# Patient Record
Sex: MALE | Race: WHITE | NOT HISPANIC OR LATINO | Employment: OTHER | URBAN - METROPOLITAN AREA
[De-identification: names, ages, dates, MRNs, and addresses within clinical notes are randomized per-mention and may not be internally consistent; named-entity substitution may affect disease eponyms.]

---

## 2019-09-09 ENCOUNTER — OFFICE VISIT (OUTPATIENT)
Dept: INTERNAL MEDICINE | Facility: CLINIC | Age: 64
End: 2019-09-09

## 2019-09-09 VITALS
HEIGHT: 71 IN | WEIGHT: 202 LBS | OXYGEN SATURATION: 99 % | TEMPERATURE: 97.8 F | RESPIRATION RATE: 16 BRPM | HEART RATE: 64 BPM | BODY MASS INDEX: 28.28 KG/M2

## 2019-09-09 DIAGNOSIS — K21.9 GASTROESOPHAGEAL REFLUX DISEASE WITHOUT ESOPHAGITIS: ICD-10-CM

## 2019-09-09 DIAGNOSIS — I10 ESSENTIAL HYPERTENSION: ICD-10-CM

## 2019-09-09 DIAGNOSIS — E03.9 HYPOTHYROIDISM, UNSPECIFIED TYPE: Primary | ICD-10-CM

## 2019-09-09 PROCEDURE — 99204 OFFICE O/P NEW MOD 45 MIN: CPT | Performed by: FAMILY MEDICINE

## 2019-09-09 RX ORDER — OMEPRAZOLE 40 MG/1
40 CAPSULE, DELAYED RELEASE ORAL DAILY
Qty: 90 CAPSULE | Refills: 3 | Status: SHIPPED | OUTPATIENT
Start: 2019-09-09 | End: 2019-09-09 | Stop reason: SDUPTHER

## 2019-09-09 RX ORDER — OMEPRAZOLE 40 MG/1
40 CAPSULE, DELAYED RELEASE ORAL DAILY
Qty: 90 CAPSULE | Refills: 3 | Status: SHIPPED | OUTPATIENT
Start: 2019-09-09 | End: 2021-05-19 | Stop reason: SDUPTHER

## 2019-09-09 RX ORDER — LEVOTHYROXINE SODIUM 0.15 MG/1
150 TABLET ORAL DAILY
COMMUNITY
End: 2019-10-02 | Stop reason: DRUGHIGH

## 2019-09-09 RX ORDER — LOSARTAN POTASSIUM AND HYDROCHLOROTHIAZIDE 25; 100 MG/1; MG/1
1 TABLET ORAL DAILY
COMMUNITY
End: 2019-09-30 | Stop reason: SDUPTHER

## 2019-09-09 NOTE — PROGRESS NOTES
Subjective   Yohan Lawler is a 64 y.o. male.     Chief Complaint   Patient presents with   • New Patient     Chief complaint hypothyroidism, GERD, essential hypertension.    History of Present Illness     Patient new patient today's office visit.  Patient has a past medical history for hypothyroidism.  Patient is currently taking levothyroxine 150 mg daily.  Patient denies any side effects of the medication.  Patient has a past medical history for essential hypertension.  At today's office visit his blood pressure is 138/90.    Patient has a past medical history of essential hypertension.  Patient blood pressure is 138/90.  Patient is currently taking losartan-hydrochlorothiazide 100-25 mg daily.  He denies any side effects of the medication.    Patient has a past medical history for GERD.  Patient is currently taking omeprazole 40 mg daily.  Patient denies any side effects of the medication.    The following portions of the patient's history were reviewed and updated as appropriate: allergies, current medications, past family history, past medical history, past social history, past surgical history and problem list.    Review of Systems   Constitutional: Negative for chills and fever.   HENT: Negative for congestion, rhinorrhea, sinus pain and sore throat.    Eyes: Negative for photophobia and visual disturbance.   Respiratory: Negative for cough, chest tightness and shortness of breath.    Cardiovascular: Negative for chest pain and palpitations.   Gastrointestinal: Negative for diarrhea, nausea and vomiting.   Genitourinary: Negative for dysuria, frequency and urgency.   Skin: Negative for rash and wound.   Neurological: Negative for dizziness and syncope.   Psychiatric/Behavioral: Negative for behavioral problems and confusion.       Objective   Physical Exam   Constitutional: He is oriented to person, place, and time. He appears well-developed and well-nourished.   HENT:   Head: Normocephalic and  atraumatic.   Right Ear: External ear normal.   Left Ear: External ear normal.   Eyes: EOM are normal.   Neck: Normal range of motion. Neck supple.   Cardiovascular: Normal rate, regular rhythm and normal heart sounds.   Pulmonary/Chest: Effort normal and breath sounds normal. No respiratory distress.   Musculoskeletal: Normal range of motion.   Lymphadenopathy:     He has no cervical adenopathy.   Neurological: He is alert and oriented to person, place, and time.   Skin: Skin is warm.   Psychiatric: He has a normal mood and affect. His behavior is normal.   Nursing note and vitals reviewed.      Assessment/Plan   Yohan was seen today for new patient.    Diagnoses and all orders for this visit:    Hypothyroidism, unspecified type  -     Thyroid Panel With TSH  -     Thyroid Panel With TSH; Future  -     Continue levothyroxine 150 mcg daily.    Essential hypertension  -     Comprehensive Metabolic Panel  -     Comprehensive Metabolic Panel; Future  -     We will continue losartan-hydrochlorothiazide 100-25 mg daily.    Gastroesophageal reflux disease without esophagitis  -     We will restart patient on his Prilosec 40 mg daily.  -     omeprazole (priLOSEC) 40 MG capsule; Take 1 capsule by mouth Daily.          No Follow-up on file.    Dictated utilizing Dragon Voice Recognition Software

## 2019-09-10 ENCOUNTER — RESULTS ENCOUNTER (OUTPATIENT)
Dept: INTERNAL MEDICINE | Facility: CLINIC | Age: 64
End: 2019-09-10

## 2019-09-10 DIAGNOSIS — E03.9 HYPOTHYROIDISM, UNSPECIFIED TYPE: ICD-10-CM

## 2019-09-10 DIAGNOSIS — I10 ESSENTIAL HYPERTENSION: ICD-10-CM

## 2019-09-10 LAB
ALBUMIN SERPL-MCNC: 4.8 G/DL (ref 3.5–5.2)
ALBUMIN/GLOB SERPL: 2 G/DL
ALP SERPL-CCNC: 62 U/L (ref 39–117)
ALT SERPL-CCNC: 36 U/L (ref 1–41)
AST SERPL-CCNC: 28 U/L (ref 1–40)
BILIRUB SERPL-MCNC: 0.8 MG/DL (ref 0.2–1.2)
BUN SERPL-MCNC: 11 MG/DL (ref 8–23)
BUN/CREAT SERPL: 13.4 (ref 7–25)
CALCIUM SERPL-MCNC: 10.1 MG/DL (ref 8.6–10.5)
CHLORIDE SERPL-SCNC: 97 MMOL/L (ref 98–107)
CO2 SERPL-SCNC: 27 MMOL/L (ref 22–29)
CREAT SERPL-MCNC: 0.82 MG/DL (ref 0.76–1.27)
FT4I SERPL CALC-MCNC: 2.5 (ref 1.2–4.9)
GLOBULIN SER CALC-MCNC: 2.4 GM/DL
GLUCOSE SERPL-MCNC: 89 MG/DL (ref 65–99)
POTASSIUM SERPL-SCNC: 4.4 MMOL/L (ref 3.5–5.2)
PROT SERPL-MCNC: 7.2 G/DL (ref 6–8.5)
SODIUM SERPL-SCNC: 139 MMOL/L (ref 136–145)
T3RU NFR SERPL: 28 % (ref 24–39)
T4 SERPL-MCNC: 8.8 UG/DL (ref 4.5–12)
TSH SERPL DL<=0.005 MIU/L-ACNC: 2.12 UIU/ML (ref 0.45–4.5)

## 2019-09-30 DIAGNOSIS — E03.9 HYPOTHYROIDISM, UNSPECIFIED TYPE: Primary | ICD-10-CM

## 2019-09-30 RX ORDER — LOSARTAN POTASSIUM AND HYDROCHLOROTHIAZIDE 25; 100 MG/1; MG/1
1 TABLET ORAL DAILY
Qty: 90 TABLET | Refills: 0 | Status: SHIPPED | OUTPATIENT
Start: 2019-09-30 | End: 2019-10-23 | Stop reason: ALTCHOICE

## 2019-10-01 NOTE — TELEPHONE ENCOUNTER
If he has been consistently taking 137mcg we can switch him to that. Lets make sure he has a follow up in the office in the next 2 mos. Thanks.

## 2019-10-02 RX ORDER — LEVOTHYROXINE SODIUM 137 UG/1
137 TABLET ORAL DAILY
Qty: 30 TABLET | Refills: 0 | Status: SHIPPED | OUTPATIENT
Start: 2019-10-02 | End: 2019-10-07 | Stop reason: SDUPTHER

## 2019-10-07 DIAGNOSIS — E03.9 HYPOTHYROIDISM, UNSPECIFIED TYPE: ICD-10-CM

## 2019-10-07 RX ORDER — LEVOTHYROXINE SODIUM 137 UG/1
137 TABLET ORAL DAILY
Qty: 90 TABLET | Refills: 2 | Status: SHIPPED | OUTPATIENT
Start: 2019-10-07 | End: 2019-11-20 | Stop reason: SDUPTHER

## 2019-10-22 RX ORDER — HYDROCHLOROTHIAZIDE 25 MG/1
25 TABLET ORAL DAILY
Qty: 30 TABLET | Refills: 1 | Status: SHIPPED | OUTPATIENT
Start: 2019-10-22 | End: 2019-11-20 | Stop reason: SDUPTHER

## 2019-10-22 RX ORDER — LOSARTAN POTASSIUM 100 MG/1
100 TABLET ORAL DAILY
Qty: 30 TABLET | Refills: 1 | Status: SHIPPED | OUTPATIENT
Start: 2019-10-22 | End: 2019-10-31 | Stop reason: SDUPTHER

## 2019-10-23 ENCOUNTER — OFFICE VISIT (OUTPATIENT)
Dept: INTERNAL MEDICINE | Facility: CLINIC | Age: 64
End: 2019-10-23

## 2019-10-23 VITALS
DIASTOLIC BLOOD PRESSURE: 66 MMHG | BODY MASS INDEX: 28.49 KG/M2 | RESPIRATION RATE: 18 BRPM | HEIGHT: 71 IN | OXYGEN SATURATION: 95 % | WEIGHT: 203.5 LBS | HEART RATE: 87 BPM | TEMPERATURE: 98.2 F | SYSTOLIC BLOOD PRESSURE: 120 MMHG

## 2019-10-23 DIAGNOSIS — Z11.59 ENCOUNTER FOR HEPATITIS C SCREENING TEST FOR LOW RISK PATIENT: ICD-10-CM

## 2019-10-23 DIAGNOSIS — R73.9 BLOOD GLUCOSE ELEVATED: ICD-10-CM

## 2019-10-23 DIAGNOSIS — Z23 NEED FOR VACCINATION AGAINST STREPTOCOCCUS PNEUMONIAE: ICD-10-CM

## 2019-10-23 DIAGNOSIS — Z23 NEED FOR PNEUMOCOCCAL VACCINATION: ICD-10-CM

## 2019-10-23 DIAGNOSIS — Z12.11 ENCOUNTER FOR SCREENING COLONOSCOPY: ICD-10-CM

## 2019-10-23 DIAGNOSIS — Z00.00 HEALTH CARE MAINTENANCE: ICD-10-CM

## 2019-10-23 DIAGNOSIS — Z00.00 MEDICARE WELCOME VISIT: Primary | ICD-10-CM

## 2019-10-23 PROBLEM — R20.2 TINGLING: Status: ACTIVE | Noted: 2019-10-23

## 2019-10-23 PROBLEM — G56.00 CARPAL TUNNEL SYNDROME: Status: ACTIVE | Noted: 2017-11-14

## 2019-10-23 PROBLEM — I10 BENIGN ESSENTIAL HTN: Status: ACTIVE | Noted: 2019-10-23

## 2019-10-23 PROBLEM — M71.9 BURSITIS: Status: ACTIVE | Noted: 2019-10-23

## 2019-10-23 PROCEDURE — G0009 ADMIN PNEUMOCOCCAL VACCINE: HCPCS | Performed by: FAMILY MEDICINE

## 2019-10-23 PROCEDURE — 90670 PCV13 VACCINE IM: CPT | Performed by: FAMILY MEDICINE

## 2019-10-23 PROCEDURE — G0438 PPPS, INITIAL VISIT: HCPCS | Performed by: FAMILY MEDICINE

## 2019-10-23 RX ORDER — CHLORAL HYDRATE 500 MG
1000 CAPSULE ORAL
COMMUNITY

## 2019-10-23 NOTE — PROGRESS NOTES
The ABCs of the Annual Wellness Visit  [facility] to Medicare Visit    Chief Complaint   Patient presents with   • Welcome To Medicare       Subjective   History of Present Illness:  Yohan Lawler is a 64 y.o. male who presents for a  Welcome to Medicare Visit.    HEALTH RISK ASSESSMENT    Recent Hospitalizations:  No hospitalization(s) within the last year.    Current Medical Providers:  Patient Care Team:  Rusty Hickey MD as PCP - General (Family Medicine)    Smoking Status:  Social History     Tobacco Use   Smoking Status Never Smoker   Smokeless Tobacco Never Used       Alcohol Consumption:  Social History     Substance and Sexual Activity   Alcohol Use Yes   • Alcohol/week: 7.2 oz   • Types: 12 Cans of beer per week   • Frequency: 4 or more times a week   • Drinks per session: 10 or more   • Binge frequency: Weekly       Depression Screen:   PHQ-2/PHQ-9 Depression Screening 10/23/2019   Little interest or pleasure in doing things 0   Feeling down, depressed, or hopeless 0   Total Score 0       Fall Risk Screen:  CHANNING Fall Risk Assessment was completed, and patient is at HIGH risk for falls. Assessment completed on:9/9/2019    Health Habits and Functional and Cognitive Screening:  Functional & Cognitive Status 10/23/2019   Do you have difficulty preparing food and eating? No   Do you have difficulty bathing yourself, getting dressed or grooming yourself? No   Do you have difficulty using the toilet? No   Do you have difficulty moving around from place to place? No   Do you have trouble with steps or getting out of a bed or a chair? No   Current Diet Well Balanced Diet   Dental Exam Up to date   Eye Exam Up to date   Exercise (times per week) 7 times per week   Current Exercise Activities Include Cardiovasular Workout on Exercise Equipment   Do you need help using the phone?  No   Are you deaf or do you have serious difficulty hearing?  Yes   Do you need help with transportation? No   Do you need help  shopping? No   Do you need help preparing meals?  No   Do you need help with housework?  No   Do you need help with laundry? No   Do you need help taking your medications? No   Do you need help managing money? No   Do you ever drive or ride in a car without wearing a seat belt? No   Have you felt unusual stress, anger or loneliness in the last month? No   Who do you live with? Spouse   If you need help, do you have trouble finding someone available to you? No   Have you been bothered in the last four weeks by sexual problems? No   Do you have difficulty concentrating, remembering or making decisions? No         Does the patient have evidence of cognitive impairment? No    Asprin use counseling:Taking ASA appropriately as indicated    Visual Acuity:    No exam data present    Age-appropriate Screening Schedule:  Refer to the list below for future screening recommendations based on patient's age, sex and/or medical conditions. Orders for these recommended tests are listed in the plan section. The patient has been provided with a written plan.    Health Maintenance   Topic Date Due   • TDAP/TD VACCINES (1 - Tdap) 08/12/1972   • ZOSTER VACCINE (1 of 2) 08/12/2003   • PNEUMOCOCCAL VACCINES (65+ LOW/MEDIUM RISK) (1 of 2 - PCV13) 08/12/2018   • COLONOSCOPY  09/09/2019   • INFLUENZA VACCINE  Completed          The following portions of the patient's history were reviewed and updated as appropriate: allergies, current medications, past family history, past medical history, past social history, past surgical history and problem list.    Outpatient Medications Prior to Visit   Medication Sig Dispense Refill   • aspirin 81 MG tablet Take 81 mg by mouth Daily.     • diphenhydrAMINE-APAP, sleep,  MG/30ML liquid Take  by mouth.     • hydroCHLOROthiazide (HYDRODIURIL) 25 MG tablet Take 1 tablet by mouth Daily. 30 tablet 1   • levothyroxine (SYNTHROID, LEVOTHROID) 137 MCG tablet Take 1 tablet by mouth Daily. 90 tablet 2   •  losartan (COZAAR) 100 MG tablet Take 1 tablet by mouth Daily. 30 tablet 1   • MULTIPLE VITAMINS-MINERALS ER PO Take  by mouth.     • Omega-3 Fatty Acids (FISH OIL) 1000 MG capsule capsule Take  by mouth.     • omeprazole (priLOSEC) 40 MG capsule Take 1 capsule by mouth Daily. 90 capsule 3   • losartan-hydrochlorothiazide (HYZAAR) 100-25 MG per tablet Take 1 tablet by mouth Daily. 90 tablet 0     No facility-administered medications prior to visit.        Patient Active Problem List   Diagnosis   • Essential hypertension   • Hypothyroidism   • GERD (gastroesophageal reflux disease)   • Blood glucose elevated   • Bursitis   • Carpal tunnel syndrome   • Tingling   • Benign essential HTN       Advanced Care Planning:  Patient does not have an advance directive - not interested in additional information    Review of Systems   Constitutional: Negative for chills and fever.   HENT: Negative for congestion, rhinorrhea, sinus pain and sore throat.    Eyes: Negative for photophobia and visual disturbance.   Respiratory: Negative for cough, chest tightness and shortness of breath.    Cardiovascular: Negative for chest pain and palpitations.   Gastrointestinal: Negative for diarrhea, nausea and vomiting.   Genitourinary: Negative for dysuria, frequency and urgency.   Skin: Negative for rash and wound.   Neurological: Negative for dizziness and syncope.   Psychiatric/Behavioral: Negative for behavioral problems and confusion.       Compared to one year ago, the patient feels his physical health is the same.  Compared to one year ago, the patient feels his mental health is the same.    Reviewed chart for potential of high risk medication in the elderly: yes  Reviewed chart for potential of harmful drug interactions in the elderly:yes    Objective         Vitals:    10/23/19 1418   BP: 120/66   BP Location: Left arm   Patient Position: Sitting   Cuff Size: Adult   Pulse: 87   Resp: 18   Temp: 98.2 °F (36.8 °C)   TempSrc: Oral  "  SpO2: 95%   Weight: 92.3 kg (203 lb 8 oz)   Height: 180.3 cm (70.98\")   PainSc: 0-No pain       Body mass index is 28.4 kg/m².  Discussed the patient's BMI with him. The BMI is in the acceptable range.    Physical Exam   Constitutional: He is oriented to person, place, and time. He appears well-developed and well-nourished.   HENT:   Head: Normocephalic and atraumatic.   Right Ear: External ear normal.   Left Ear: External ear normal.   Eyes: EOM are normal.   Neck: Normal range of motion. Neck supple.   Cardiovascular: Normal rate, regular rhythm and normal heart sounds.   Pulmonary/Chest: Effort normal and breath sounds normal. No respiratory distress.   Musculoskeletal: Normal range of motion.   Lymphadenopathy:     He has no cervical adenopathy.   Neurological: He is alert and oriented to person, place, and time.   Skin: Skin is warm.   Psychiatric: He has a normal mood and affect. His behavior is normal.   Nursing note and vitals reviewed.      Lab Results   Component Value Date    GLU 89 09/09/2019        Procedures    Assessment/Plan   Medicare Risks and Personalized Health Plan  CMS Preventative Services Quick Reference  Cardiovascular risk  Immunizations Discussed/Encouraged (specific immunizations; Prevnar )    The above risks/problems have been discussed with the patient.  Pertinent information has been shared with the patient in the After Visit Summary.  Follow up plans and orders are seen below in the Assessment/Plan Section.    Diagnoses and all orders for this visit:    1. Medicare welcome visit (Primary)    2. Encounter for hepatitis C screening test for low risk patient  -     Hepatitis C Antibody    3. Need for vaccination against Streptococcus pneumoniae    4. Encounter for screening colonoscopy  -     Ambulatory Referral to Gastroenterology    5. Need for pneumococcal vaccination  -     Pneumococcal Conjugate Vaccine 13-Valent All    6. Blood glucose elevated  -     Hemoglobin A1c    7. Health " care maintenance  -     Lipid Panel With LDL / HDL Ratio  -     Thyroid Panel With TSH  -     Comprehensive Metabolic Panel  -     CBC & Differential  -     Hemoglobin A1c        Follow Up:  No Follow-up on file.     An After Visit Summary and PPPS were given to the patient.

## 2019-10-26 LAB
ALBUMIN SERPL-MCNC: 4.7 G/DL (ref 3.5–5.2)
ALBUMIN/GLOB SERPL: 2 G/DL
ALP SERPL-CCNC: 86 U/L (ref 39–117)
ALT SERPL-CCNC: 32 U/L (ref 1–41)
AST SERPL-CCNC: 23 U/L (ref 1–40)
BASOPHILS # BLD AUTO: 0.03 10*3/MM3 (ref 0–0.2)
BASOPHILS NFR BLD AUTO: 0.5 % (ref 0–1.5)
BILIRUB SERPL-MCNC: 0.3 MG/DL (ref 0.2–1.2)
BUN SERPL-MCNC: 9 MG/DL (ref 8–23)
BUN/CREAT SERPL: 9.8 (ref 7–25)
CALCIUM SERPL-MCNC: 9.5 MG/DL (ref 8.6–10.5)
CHLORIDE SERPL-SCNC: 94 MMOL/L (ref 98–107)
CHOLEST SERPL-MCNC: 152 MG/DL (ref 0–200)
CO2 SERPL-SCNC: 27.5 MMOL/L (ref 22–29)
CREAT SERPL-MCNC: 0.92 MG/DL (ref 0.76–1.27)
EOSINOPHIL # BLD AUTO: 0.44 10*3/MM3 (ref 0–0.4)
EOSINOPHIL NFR BLD AUTO: 6.8 % (ref 0.3–6.2)
ERYTHROCYTE [DISTWIDTH] IN BLOOD BY AUTOMATED COUNT: 12 % (ref 12.3–15.4)
FT4I SERPL CALC-MCNC: 2.3 (ref 1.2–4.9)
GLOBULIN SER CALC-MCNC: 2.4 GM/DL
GLUCOSE SERPL-MCNC: 106 MG/DL (ref 65–99)
HBA1C MFR BLD: 5.4 % (ref 4.8–5.6)
HCT VFR BLD AUTO: 42.4 % (ref 37.5–51)
HCV AB S/CO SERPL IA: 0.2 S/CO RATIO (ref 0–0.9)
HDLC SERPL-MCNC: 49 MG/DL (ref 40–60)
HGB BLD-MCNC: 15 G/DL (ref 13–17.7)
IMM GRANULOCYTES # BLD AUTO: 0.04 10*3/MM3 (ref 0–0.05)
IMM GRANULOCYTES NFR BLD AUTO: 0.6 % (ref 0–0.5)
LDLC SERPL CALC-MCNC: 86 MG/DL (ref 0–100)
LDLC/HDLC SERPL: 1.76 {RATIO}
LYMPHOCYTES # BLD AUTO: 1.35 10*3/MM3 (ref 0.7–3.1)
LYMPHOCYTES NFR BLD AUTO: 20.8 % (ref 19.6–45.3)
MCH RBC QN AUTO: 32.8 PG (ref 26.6–33)
MCHC RBC AUTO-ENTMCNC: 35.4 G/DL (ref 31.5–35.7)
MCV RBC AUTO: 92.8 FL (ref 79–97)
MONOCYTES # BLD AUTO: 0.53 10*3/MM3 (ref 0.1–0.9)
MONOCYTES NFR BLD AUTO: 8.2 % (ref 5–12)
NEUTROPHILS # BLD AUTO: 4.09 10*3/MM3 (ref 1.7–7)
NEUTROPHILS NFR BLD AUTO: 63.1 % (ref 42.7–76)
NRBC BLD AUTO-RTO: 0 /100 WBC (ref 0–0.2)
PLATELET # BLD AUTO: 406 10*3/MM3 (ref 140–450)
POTASSIUM SERPL-SCNC: 4.6 MMOL/L (ref 3.5–5.2)
PROT SERPL-MCNC: 7.1 G/DL (ref 6–8.5)
RBC # BLD AUTO: 4.57 10*6/MM3 (ref 4.14–5.8)
SODIUM SERPL-SCNC: 135 MMOL/L (ref 136–145)
T3RU NFR SERPL: 27 % (ref 24–39)
T4 SERPL-MCNC: 8.5 UG/DL (ref 4.5–12)
TRIGL SERPL-MCNC: 83 MG/DL (ref 0–150)
TSH SERPL DL<=0.005 MIU/L-ACNC: 4.05 UIU/ML (ref 0.45–4.5)
VLDLC SERPL CALC-MCNC: 16.6 MG/DL
WBC # BLD AUTO: 6.48 10*3/MM3 (ref 3.4–10.8)

## 2019-10-28 DIAGNOSIS — E87.1 HYPONATREMIA: Primary | ICD-10-CM

## 2019-10-28 NOTE — PROGRESS NOTES
Please inform the patient of the following abnormal results.  Labs are normal however sodium levels appear to be slightly low.  He needs to repeat a BMP later this week.  Orders have been placed.

## 2019-10-30 LAB
BUN SERPL-MCNC: 7 MG/DL (ref 8–23)
BUN/CREAT SERPL: 8.5 (ref 7–25)
CALCIUM SERPL-MCNC: 9.6 MG/DL (ref 8.6–10.5)
CHLORIDE SERPL-SCNC: 94 MMOL/L (ref 98–107)
CO2 SERPL-SCNC: 28 MMOL/L (ref 22–29)
CREAT SERPL-MCNC: 0.82 MG/DL (ref 0.76–1.27)
GLUCOSE SERPL-MCNC: 107 MG/DL (ref 65–99)
POTASSIUM SERPL-SCNC: 4.6 MMOL/L (ref 3.5–5.2)
SODIUM SERPL-SCNC: 135 MMOL/L (ref 136–145)

## 2019-10-31 RX ORDER — LOSARTAN POTASSIUM 100 MG/1
100 TABLET ORAL DAILY
Qty: 90 TABLET | Refills: 0 | Status: SHIPPED | OUTPATIENT
Start: 2019-10-31 | End: 2019-11-20 | Stop reason: SDUPTHER

## 2019-11-02 ENCOUNTER — RESULTS ENCOUNTER (OUTPATIENT)
Dept: INTERNAL MEDICINE | Facility: CLINIC | Age: 64
End: 2019-11-02

## 2019-11-02 DIAGNOSIS — E87.1 HYPONATREMIA: ICD-10-CM

## 2019-11-19 ENCOUNTER — DOCUMENTATION (OUTPATIENT)
Dept: SURGERY | Facility: CLINIC | Age: 64
End: 2019-11-19

## 2019-11-21 ENCOUNTER — TELEPHONE (OUTPATIENT)
Dept: GASTROENTEROLOGY | Facility: CLINIC | Age: 64
End: 2019-11-21

## 2019-11-21 NOTE — TELEPHONE ENCOUNTER
FAST TRACK (IN MEDIA) - LAST COLONOSCOPY 2014, UNABLE TO LOCATE REPORT - PERSONAL HISTORY POLYPS - SCHEDULE AT [facility].

## 2019-11-24 ENCOUNTER — PREP FOR SURGERY (OUTPATIENT)
Dept: OTHER | Facility: HOSPITAL | Age: 64
End: 2019-11-24

## 2019-11-24 DIAGNOSIS — Z86.010 PERSONAL HISTORY OF COLONIC POLYPS: Primary | ICD-10-CM

## 2019-11-25 ENCOUNTER — HOSPITAL ENCOUNTER (OUTPATIENT)
Facility: HOSPITAL | Age: 64
Setting detail: HOSPITAL OUTPATIENT SURGERY
End: 2019-11-25
Attending: INTERNAL MEDICINE | Admitting: INTERNAL MEDICINE

## 2019-11-25 PROBLEM — Z86.010 PERSONAL HISTORY OF COLONIC POLYPS: Status: ACTIVE | Noted: 2019-11-25

## 2019-11-25 PROBLEM — Z86.0100 PERSONAL HISTORY OF COLONIC POLYPS: Status: ACTIVE | Noted: 2019-11-25

## 2019-11-25 NOTE — TELEPHONE ENCOUNTER
CALLED AND SPOKE WITH PATIENT.  SCHEDULED AT [facility] 02/04/2020 AT 3PM -ARRIVE 2PM.  WILL MAIL INSTRUCTIONS.

## 2020-01-23 ENCOUNTER — TELEPHONE (OUTPATIENT)
Dept: GASTROENTEROLOGY | Facility: CLINIC | Age: 65
End: 2020-01-23

## 2020-01-23 ENCOUNTER — PREP FOR SURGERY (OUTPATIENT)
Dept: GASTROENTEROLOGY | Facility: CLINIC | Age: 65
End: 2020-01-23

## 2020-01-23 DIAGNOSIS — K63.5 POLYP OF COLON, UNSPECIFIED PART OF COLON, UNSPECIFIED TYPE: ICD-10-CM

## 2020-01-23 DIAGNOSIS — Z12.11 ENCOUNTER FOR SCREENING FOR MALIGNANT NEOPLASM OF COLON: Primary | ICD-10-CM

## 2020-01-23 NOTE — TELEPHONE ENCOUNTER
Spoke with patient.  Scheduled for colonoscopy on 02/04/2020.  Wants to schedule with Dr. Dockery.  Gave him his phone number.

## 2020-02-26 ENCOUNTER — OUTSIDE FACILITY SERVICE (OUTPATIENT)
Dept: GASTROENTEROLOGY | Facility: CLINIC | Age: 65
End: 2020-02-26

## 2020-02-26 ENCOUNTER — LAB REQUISITION (OUTPATIENT)
Dept: LAB | Facility: HOSPITAL | Age: 65
End: 2020-02-26

## 2020-02-26 DIAGNOSIS — Z86.010 PERSONAL HISTORY OF COLONIC POLYPS: ICD-10-CM

## 2020-02-26 PROCEDURE — 88305 TISSUE EXAM BY PATHOLOGIST: CPT | Performed by: INTERNAL MEDICINE

## 2020-02-26 PROCEDURE — 45380 COLONOSCOPY AND BIOPSY: CPT | Performed by: INTERNAL MEDICINE

## 2020-02-27 LAB
CYTO UR: NORMAL
LAB AP CASE REPORT: NORMAL
LAB AP CLINICAL INFORMATION: NORMAL
PATH REPORT.FINAL DX SPEC: NORMAL
PATH REPORT.GROSS SPEC: NORMAL

## 2020-09-08 RX ORDER — HYDROCHLOROTHIAZIDE 25 MG/1
25 TABLET ORAL DAILY
Qty: 30 TABLET | Refills: 0 | Status: SHIPPED | OUTPATIENT
Start: 2020-09-08 | End: 2020-10-12 | Stop reason: SDUPTHER

## 2020-09-22 ENCOUNTER — CLINICAL SUPPORT (OUTPATIENT)
Dept: INTERNAL MEDICINE | Facility: CLINIC | Age: 65
End: 2020-09-22

## 2020-09-22 DIAGNOSIS — Z23 NEEDS FLU SHOT: Primary | ICD-10-CM

## 2020-09-22 PROCEDURE — 90694 VACC AIIV4 NO PRSRV 0.5ML IM: CPT | Performed by: FAMILY MEDICINE

## 2020-09-22 PROCEDURE — G0008 ADMIN INFLUENZA VIRUS VAC: HCPCS | Performed by: FAMILY MEDICINE

## 2020-10-02 ENCOUNTER — APPOINTMENT (OUTPATIENT)
Dept: GENERAL RADIOLOGY | Facility: HOSPITAL | Age: 65
End: 2020-10-02

## 2020-10-02 PROCEDURE — 72100 X-RAY EXAM L-S SPINE 2/3 VWS: CPT | Performed by: PHYSICIAN ASSISTANT

## 2020-10-02 PROCEDURE — 71101 X-RAY EXAM UNILAT RIBS/CHEST: CPT | Performed by: PHYSICIAN ASSISTANT

## 2020-10-12 RX ORDER — HYDROCHLOROTHIAZIDE 25 MG/1
25 TABLET ORAL DAILY
Qty: 30 TABLET | Refills: 0 | Status: SHIPPED | OUTPATIENT
Start: 2020-10-12 | End: 2020-11-09 | Stop reason: SDUPTHER

## 2020-11-09 ENCOUNTER — LAB (OUTPATIENT)
Dept: LAB | Facility: HOSPITAL | Age: 65
End: 2020-11-09

## 2020-11-09 ENCOUNTER — OFFICE VISIT (OUTPATIENT)
Dept: INTERNAL MEDICINE | Facility: CLINIC | Age: 65
End: 2020-11-09

## 2020-11-09 VITALS
WEIGHT: 212 LBS | BODY MASS INDEX: 30.35 KG/M2 | SYSTOLIC BLOOD PRESSURE: 124 MMHG | HEART RATE: 74 BPM | OXYGEN SATURATION: 98 % | HEIGHT: 70 IN | TEMPERATURE: 98.3 F | RESPIRATION RATE: 16 BRPM | DIASTOLIC BLOOD PRESSURE: 84 MMHG

## 2020-11-09 DIAGNOSIS — Z00.00 HEALTHCARE MAINTENANCE: ICD-10-CM

## 2020-11-09 DIAGNOSIS — Z12.5 SCREENING FOR PROSTATE CANCER: ICD-10-CM

## 2020-11-09 DIAGNOSIS — I10 ESSENTIAL HYPERTENSION: ICD-10-CM

## 2020-11-09 DIAGNOSIS — E03.9 HYPOTHYROIDISM, UNSPECIFIED TYPE: ICD-10-CM

## 2020-11-09 DIAGNOSIS — Z00.00 MEDICARE ANNUAL WELLNESS VISIT, SUBSEQUENT: Primary | ICD-10-CM

## 2020-11-09 DIAGNOSIS — Z23 NEED FOR PNEUMOCOCCAL VACCINATION: ICD-10-CM

## 2020-11-09 LAB
ALBUMIN SERPL-MCNC: 4.8 G/DL (ref 3.5–5.2)
ALBUMIN/GLOB SERPL: 1.9 G/DL
ALP SERPL-CCNC: 63 U/L (ref 39–117)
ALT SERPL W P-5'-P-CCNC: 38 U/L (ref 1–41)
ANION GAP SERPL CALCULATED.3IONS-SCNC: 11.6 MMOL/L (ref 5–15)
AST SERPL-CCNC: 31 U/L (ref 1–40)
BASOPHILS # BLD AUTO: 0.05 10*3/MM3 (ref 0–0.2)
BASOPHILS NFR BLD AUTO: 0.6 % (ref 0–1.5)
BILIRUB SERPL-MCNC: 0.6 MG/DL (ref 0–1.2)
BUN SERPL-MCNC: 9 MG/DL (ref 8–23)
BUN/CREAT SERPL: 11 (ref 7–25)
CALCIUM SPEC-SCNC: 9.3 MG/DL (ref 8.6–10.5)
CHLORIDE SERPL-SCNC: 95 MMOL/L (ref 98–107)
CHOLEST SERPL-MCNC: 175 MG/DL (ref 0–200)
CO2 SERPL-SCNC: 26.4 MMOL/L (ref 22–29)
CREAT SERPL-MCNC: 0.82 MG/DL (ref 0.76–1.27)
DEPRECATED RDW RBC AUTO: 39.8 FL (ref 37–54)
EOSINOPHIL # BLD AUTO: 0.32 10*3/MM3 (ref 0–0.4)
EOSINOPHIL NFR BLD AUTO: 4.1 % (ref 0.3–6.2)
ERYTHROCYTE [DISTWIDTH] IN BLOOD BY AUTOMATED COUNT: 12 % (ref 12.3–15.4)
GFR SERPL CREATININE-BSD FRML MDRD: 94 ML/MIN/1.73
GLOBULIN UR ELPH-MCNC: 2.5 GM/DL
GLUCOSE SERPL-MCNC: 91 MG/DL (ref 65–99)
HBA1C MFR BLD: 5.5 % (ref 4.8–5.6)
HCT VFR BLD AUTO: 45.5 % (ref 37.5–51)
HDLC SERPL-MCNC: 49 MG/DL (ref 40–60)
HGB BLD-MCNC: 16.1 G/DL (ref 13–17.7)
IMM GRANULOCYTES # BLD AUTO: 0.04 10*3/MM3 (ref 0–0.05)
IMM GRANULOCYTES NFR BLD AUTO: 0.5 % (ref 0–0.5)
LDLC SERPL CALC-MCNC: 104 MG/DL (ref 0–100)
LDLC/HDLC SERPL: 2.06 {RATIO}
LYMPHOCYTES # BLD AUTO: 2.2 10*3/MM3 (ref 0.7–3.1)
LYMPHOCYTES NFR BLD AUTO: 28.1 % (ref 19.6–45.3)
MCH RBC QN AUTO: 32.3 PG (ref 26.6–33)
MCHC RBC AUTO-ENTMCNC: 35.4 G/DL (ref 31.5–35.7)
MCV RBC AUTO: 91.4 FL (ref 79–97)
MONOCYTES # BLD AUTO: 0.63 10*3/MM3 (ref 0.1–0.9)
MONOCYTES NFR BLD AUTO: 8 % (ref 5–12)
NEUTROPHILS NFR BLD AUTO: 4.59 10*3/MM3 (ref 1.7–7)
NEUTROPHILS NFR BLD AUTO: 58.7 % (ref 42.7–76)
NRBC BLD AUTO-RTO: 0 /100 WBC (ref 0–0.2)
PLATELET # BLD AUTO: 351 10*3/MM3 (ref 140–450)
PMV BLD AUTO: 9.8 FL (ref 6–12)
POTASSIUM SERPL-SCNC: 3.9 MMOL/L (ref 3.5–5.2)
PROT SERPL-MCNC: 7.3 G/DL (ref 6–8.5)
PSA SERPL-MCNC: 1.05 NG/ML (ref 0–4)
RBC # BLD AUTO: 4.98 10*6/MM3 (ref 4.14–5.8)
SODIUM SERPL-SCNC: 133 MMOL/L (ref 136–145)
T-UPTAKE NFR SERPL: 1.11 TBI (ref 0.8–1.3)
T4 SERPL-MCNC: 7.39 MCG/DL (ref 4.5–11.7)
TRIGL SERPL-MCNC: 126 MG/DL (ref 0–150)
TSH SERPL DL<=0.05 MIU/L-ACNC: 8.34 UIU/ML (ref 0.27–4.2)
VLDLC SERPL-MCNC: 22 MG/DL (ref 5–40)
WBC # BLD AUTO: 7.83 10*3/MM3 (ref 3.4–10.8)

## 2020-11-09 PROCEDURE — 84479 ASSAY OF THYROID (T3 OR T4): CPT | Performed by: FAMILY MEDICINE

## 2020-11-09 PROCEDURE — 85025 COMPLETE CBC W/AUTO DIFF WBC: CPT | Performed by: FAMILY MEDICINE

## 2020-11-09 PROCEDURE — 80053 COMPREHEN METABOLIC PANEL: CPT | Performed by: FAMILY MEDICINE

## 2020-11-09 PROCEDURE — 84443 ASSAY THYROID STIM HORMONE: CPT | Performed by: FAMILY MEDICINE

## 2020-11-09 PROCEDURE — 99213 OFFICE O/P EST LOW 20 MIN: CPT | Performed by: FAMILY MEDICINE

## 2020-11-09 PROCEDURE — G0439 PPPS, SUBSEQ VISIT: HCPCS | Performed by: FAMILY MEDICINE

## 2020-11-09 PROCEDURE — 83036 HEMOGLOBIN GLYCOSYLATED A1C: CPT | Performed by: FAMILY MEDICINE

## 2020-11-09 PROCEDURE — 36415 COLL VENOUS BLD VENIPUNCTURE: CPT | Performed by: FAMILY MEDICINE

## 2020-11-09 PROCEDURE — G0103 PSA SCREENING: HCPCS | Performed by: FAMILY MEDICINE

## 2020-11-09 PROCEDURE — 84436 ASSAY OF TOTAL THYROXINE: CPT | Performed by: FAMILY MEDICINE

## 2020-11-09 PROCEDURE — 80061 LIPID PANEL: CPT | Performed by: FAMILY MEDICINE

## 2020-11-09 RX ORDER — HYDROCHLOROTHIAZIDE 25 MG/1
25 TABLET ORAL DAILY
Qty: 90 TABLET | Refills: 3 | Status: SHIPPED | OUTPATIENT
Start: 2020-11-09 | End: 2021-11-02

## 2020-11-09 RX ORDER — LEVOTHYROXINE SODIUM 137 UG/1
137 TABLET ORAL DAILY
Qty: 90 TABLET | Refills: 2 | Status: SHIPPED | OUTPATIENT
Start: 2020-11-09 | End: 2020-11-10

## 2020-11-09 RX ORDER — LOSARTAN POTASSIUM 100 MG/1
100 TABLET ORAL DAILY
Qty: 90 TABLET | Refills: 3 | Status: SHIPPED | OUTPATIENT
Start: 2020-11-09 | End: 2021-11-02

## 2020-11-09 NOTE — PROGRESS NOTES
The ABCs of the Annual Wellness Visit  Subsequent Medicare Wellness Visit    Chief Complaint   Patient presents with   • Medicare Wellness-subsequent       Subjective   History of Present Illness:  Yohan Lawler is a 65 y.o. male who presents for a Subsequent Medicare Wellness Visit.    Patient past medical history for hypothyroidism.  Patient currently on levothyroxine 137 mcg daily.  Patient denies any side effects of the medication.    Patient also has essential hypertension.  Blood pressure today's office is 124/84.  Currently taking hydrochlorothiazide 25 mg daily along with losartan 100 mg daily.  Patient denies any side effects of the medication.    HEALTH RISK ASSESSMENT    Recent Hospitalizations:  No hospitalization(s) within the last year.    Current Medical Providers:  Patient Care Team:  Rusty Hickey MD as PCP - General (Family Medicine)    Smoking Status:  Social History     Tobacco Use   Smoking Status Never Smoker   Smokeless Tobacco Never Used       Alcohol Consumption:  Social History     Substance and Sexual Activity   Alcohol Use Yes   • Alcohol/week: 12.0 standard drinks   • Types: 12 Cans of beer per week   • Frequency: 4 or more times a week   • Drinks per session: 10 or more   • Binge frequency: Weekly       Depression Screen:   PHQ-2/PHQ-9 Depression Screening 11/9/2020   Little interest or pleasure in doing things 0   Feeling down, depressed, or hopeless 0   Total Score 0       Fall Risk Screen:  STEADI Fall Risk Assessment has not been completed.    Health Habits and Functional and Cognitive Screening:  Functional & Cognitive Status 11/9/2020   Do you have difficulty preparing food and eating? No   Do you have difficulty bathing yourself, getting dressed or grooming yourself? No   Do you have difficulty using the toilet? No   Do you have difficulty moving around from place to place? No   Do you have trouble with steps or getting out of a bed or a chair? No   Current Diet Limited  Junk Food   Dental Exam Up to date   Eye Exam Up to date   Exercise (times per week) 0 times per week   Current Exercises Include No Regular Exercise   Current Exercise Activities Include -   Do you need help using the phone?  No   Are you deaf or do you have serious difficulty hearing?  No   Do you need help with transportation? No   Do you need help shopping? No   Do you need help preparing meals?  No   Do you need help with housework?  No   Do you need help with laundry? No   Do you need help taking your medications? No   Do you need help managing money? No   Do you ever drive or ride in a car without wearing a seat belt? No   Have you felt unusual stress, anger or loneliness in the last month? No   Who do you live with? Spouse   If you need help, do you have trouble finding someone available to you? No   Have you been bothered in the last four weeks by sexual problems? No   Do you have difficulty concentrating, remembering or making decisions? No         Does the patient have evidence of cognitive impairment? No    Asprin use counseling:Taking ASA appropriately as indicated    Age-appropriate Screening Schedule:  Refer to the list below for future screening recommendations based on patient's age, sex and/or medical conditions. Orders for these recommended tests are listed in the plan section. The patient has been provided with a written plan.    Health Maintenance   Topic Date Due   • ZOSTER VACCINE (1 of 2) 08/12/2003   • COLONOSCOPY  02/26/2025   • TDAP/TD VACCINES (3 - Td) 08/05/2026   • INFLUENZA VACCINE  Completed          The following portions of the patient's history were reviewed and updated as appropriate: allergies, current medications, past family history, past medical history, past social history, past surgical history and problem list.    Outpatient Medications Prior to Visit   Medication Sig Dispense Refill   • aspirin 81 MG tablet Take 81 mg by mouth Daily.     • diphenhydrAMINE-APAP, sleep,   MG/30ML liquid Take  by mouth.     • ibuprofen (ADVIL,MOTRIN) 800 MG tablet Take 1 tablet by mouth Every 8 (Eight) Hours As Needed for Mild Pain . 30 tablet 0   • MULTIPLE VITAMINS-MINERALS ER PO Take  by mouth.     • Omega-3 Fatty Acids (FISH OIL) 1000 MG capsule capsule Take  by mouth.     • omeprazole (priLOSEC) 40 MG capsule Take 1 capsule by mouth Daily. 90 capsule 3   • hydroCHLOROthiazide (HYDRODIURIL) 25 MG tablet Take 1 tablet by mouth Daily. 30 tablet 0   • levothyroxine (SYNTHROID, LEVOTHROID) 137 MCG tablet Take 1 tablet by mouth Daily. 90 tablet 2   • losartan (COZAAR) 100 MG tablet Take 1 tablet by mouth Daily. 90 tablet 0     No facility-administered medications prior to visit.        Patient Active Problem List   Diagnosis   • Essential hypertension   • Hypothyroidism   • GERD (gastroesophageal reflux disease)   • Blood glucose elevated   • Bursitis   • Carpal tunnel syndrome   • Tingling   • Benign essential HTN   • Personal history of colonic polyps       Advanced Care Planning:  ACP discussion was held with the patient during this visit. Patient has an advance directive in EMR which is still valid.     Review of Systems   Constitutional: Negative for chills and fever.   HENT: Negative for congestion, rhinorrhea, sinus pain and sore throat.    Eyes: Negative for photophobia and visual disturbance.   Respiratory: Negative for cough, chest tightness and shortness of breath.    Cardiovascular: Negative for chest pain and palpitations.   Gastrointestinal: Negative for diarrhea, nausea and vomiting.   Genitourinary: Negative for dysuria, frequency and urgency.   Skin: Negative for rash and wound.   Neurological: Negative for dizziness and syncope.   Psychiatric/Behavioral: Negative for behavioral problems and confusion.       Compared to one year ago, the patient feels his physical health is the same.  Compared to one year ago, the patient feels his mental health is the same.    Reviewed chart  "for potential of high risk medication in the elderly: yes  Reviewed chart for potential of harmful drug interactions in the elderly:yes    Objective         Vitals:    11/09/20 1122   BP: 124/84   Pulse: 74   Resp: 16   Temp: 98.3 °F (36.8 °C)   TempSrc: Infrared   SpO2: 98%   Weight: 96.2 kg (212 lb)   Height: 177.8 cm (70\")       Body mass index is 30.42 kg/m².  Discussed the patient's BMI with him. The BMI is in the acceptable range.    Physical Exam  Vitals signs and nursing note reviewed.   Constitutional:       Appearance: He is well-developed.   HENT:      Head: Normocephalic and atraumatic.   Neck:      Musculoskeletal: Normal range of motion and neck supple.   Neurological:      Mental Status: He is alert and oriented to person, place, and time.   Psychiatric:         Behavior: Behavior normal.               Assessment/Plan   Medicare Risks and Personalized Health Plan  CMS Preventative Services Quick Reference  Cardiovascular risk  Fall Risk    The above risks/problems have been discussed with the patient.  Pertinent information has been shared with the patient in the After Visit Summary.  Follow up plans and orders are seen below in the Assessment/Plan Section.    Diagnoses and all orders for this visit:    1. Medicare annual wellness visit, subsequent (Primary)    2. Essential hypertension  -     hydroCHLOROthiazide (HYDRODIURIL) 25 MG tablet; Take 1 tablet by mouth Daily.  Dispense: 90 tablet; Refill: 3  -     losartan (COZAAR) 100 MG tablet; Take 1 tablet by mouth Daily.  Dispense: 90 tablet; Refill: 3  -     Currently stable, continue hydrochlorothiazide and losartan.    3. Hypothyroidism, unspecified type  -     levothyroxine (SYNTHROID, LEVOTHROID) 137 MCG tablet; Take 1 tablet by mouth Daily.  Dispense: 90 tablet; Refill: 2  -     We will check thyroid panel.  Continue levothyroxine 137 mcg daily.    4. Healthcare maintenance  -     CBC & Differential  -     Comprehensive Metabolic Panel  -     " Hemoglobin A1c  -     Thyroid Panel With TSH  -     Lipid Panel With LDL / HDL Ratio    5. Screening for prostate cancer  -     PSA Screen    6. Need for pneumococcal vaccination  -     Pneumococcal Polysaccharide Vaccine 23-Valent Greater Than or Equal To 1yo Subcutaneous / IM      Follow Up:  No follow-ups on file.     An After Visit Summary and PPPS were given to the patient.

## 2020-11-10 DIAGNOSIS — E03.9 HYPOTHYROIDISM, UNSPECIFIED TYPE: Primary | ICD-10-CM

## 2020-11-10 PROCEDURE — G0009 ADMIN PNEUMOCOCCAL VACCINE: HCPCS | Performed by: FAMILY MEDICINE

## 2020-11-10 PROCEDURE — 90732 PPSV23 VACC 2 YRS+ SUBQ/IM: CPT | Performed by: FAMILY MEDICINE

## 2020-11-10 RX ORDER — LEVOTHYROXINE SODIUM 0.15 MG/1
150 TABLET ORAL DAILY
Qty: 90 TABLET | Refills: 3 | Status: SHIPPED | OUTPATIENT
Start: 2020-11-10 | End: 2021-05-20

## 2020-11-10 NOTE — PROGRESS NOTES
Please inform the patient of the following abnormal results.  Will increase levothyroxine to 150mcg daily. Follow up in one month with me. Needs to do thyroid panel couple days before seeing me.   Sodium is chronically low, so will continue to monitor.

## 2020-11-12 ENCOUNTER — TELEPHONE (OUTPATIENT)
Dept: INTERNAL MEDICINE | Facility: CLINIC | Age: 65
End: 2020-11-12

## 2020-11-12 NOTE — TELEPHONE ENCOUNTER
PATIENT CALLED WOULD LIKE TO HAVE HIS LAB RESULTS EXPLAINED TO HIM AS HE DOES NOT UNDERSTAND WHAT HE'S LOOKING AT.    PLEASE ADVISE  [phone]

## 2020-11-13 NOTE — TELEPHONE ENCOUNTER
Pt was sent a letter through [employer], but did not read it.  I called him to explain he's already picked up his meds.

## 2020-12-14 ENCOUNTER — TRANSCRIBE ORDERS (OUTPATIENT)
Dept: ADMINISTRATIVE | Facility: HOSPITAL | Age: 65
End: 2020-12-14

## 2020-12-14 ENCOUNTER — LAB (OUTPATIENT)
Dept: LAB | Facility: HOSPITAL | Age: 65
End: 2020-12-14

## 2020-12-14 DIAGNOSIS — E03.9 HYPOTHYROIDISM, UNSPECIFIED TYPE: ICD-10-CM

## 2020-12-14 DIAGNOSIS — E03.9 HYPOTHYROIDISM, UNSPECIFIED TYPE: Primary | ICD-10-CM

## 2020-12-14 LAB
T-UPTAKE NFR SERPL: 1.02 TBI (ref 0.8–1.3)
T4 SERPL-MCNC: 8.19 MCG/DL (ref 4.5–11.7)
TSH SERPL DL<=0.05 MIU/L-ACNC: 3.04 UIU/ML (ref 0.27–4.2)
TSH SERPL DL<=0.05 MIU/L-ACNC: 3.04 UIU/ML (ref 0.27–4.2)

## 2020-12-14 PROCEDURE — 84479 ASSAY OF THYROID (T3 OR T4): CPT

## 2020-12-14 PROCEDURE — 36415 COLL VENOUS BLD VENIPUNCTURE: CPT

## 2020-12-14 PROCEDURE — 84436 ASSAY OF TOTAL THYROXINE: CPT

## 2020-12-14 PROCEDURE — 84443 ASSAY THYROID STIM HORMONE: CPT

## 2021-03-19 ENCOUNTER — BULK ORDERING (OUTPATIENT)
Dept: CASE MANAGEMENT | Facility: OTHER | Age: 66
End: 2021-03-19

## 2021-03-19 DIAGNOSIS — Z23 IMMUNIZATION DUE: ICD-10-CM

## 2021-05-18 ENCOUNTER — HOSPITAL ENCOUNTER (OUTPATIENT)
Dept: GENERAL RADIOLOGY | Facility: HOSPITAL | Age: 66
Discharge: HOME | End: 2021-05-18

## 2021-05-18 ENCOUNTER — OFFICE VISIT (OUTPATIENT)
Dept: INTERNAL MEDICINE | Facility: CLINIC | Age: 66
End: 2021-05-18

## 2021-05-18 ENCOUNTER — LAB (OUTPATIENT)
Dept: LAB | Facility: HOSPITAL | Age: 66
End: 2021-05-18

## 2021-05-18 VITALS
SYSTOLIC BLOOD PRESSURE: 120 MMHG | OXYGEN SATURATION: 99 % | WEIGHT: 217.1 LBS | HEIGHT: 70 IN | BODY MASS INDEX: 31.08 KG/M2 | DIASTOLIC BLOOD PRESSURE: 84 MMHG | HEART RATE: 90 BPM

## 2021-05-18 DIAGNOSIS — E03.9 HYPOTHYROIDISM, UNSPECIFIED TYPE: ICD-10-CM

## 2021-05-18 DIAGNOSIS — M25.512 ACUTE PAIN OF LEFT SHOULDER: Primary | ICD-10-CM

## 2021-05-18 DIAGNOSIS — Z12.5 SCREENING FOR PROSTATE CANCER: ICD-10-CM

## 2021-05-18 DIAGNOSIS — I10 ESSENTIAL HYPERTENSION: ICD-10-CM

## 2021-05-18 LAB
ALBUMIN SERPL-MCNC: 4.5 G/DL (ref 3.5–5.2)
ALBUMIN/GLOB SERPL: 1.5 G/DL
ALP SERPL-CCNC: 92 U/L (ref 39–117)
ALT SERPL W P-5'-P-CCNC: 26 U/L (ref 1–41)
ANION GAP SERPL CALCULATED.3IONS-SCNC: 12.7 MMOL/L (ref 5–15)
AST SERPL-CCNC: 19 U/L (ref 1–40)
BASOPHILS # BLD AUTO: 0.04 10*3/MM3 (ref 0–0.2)
BASOPHILS NFR BLD AUTO: 0.4 % (ref 0–1.5)
BILIRUB SERPL-MCNC: 0.5 MG/DL (ref 0–1.2)
BUN SERPL-MCNC: 11 MG/DL (ref 8–23)
BUN/CREAT SERPL: 11.5 (ref 7–25)
CALCIUM SPEC-SCNC: 9.5 MG/DL (ref 8.6–10.5)
CHLORIDE SERPL-SCNC: 94 MMOL/L (ref 98–107)
CHOLEST SERPL-MCNC: 157 MG/DL (ref 0–200)
CO2 SERPL-SCNC: 27.3 MMOL/L (ref 22–29)
CREAT SERPL-MCNC: 0.96 MG/DL (ref 0.76–1.27)
DEPRECATED RDW RBC AUTO: 39.6 FL (ref 37–54)
EOSINOPHIL # BLD AUTO: 0.27 10*3/MM3 (ref 0–0.4)
EOSINOPHIL NFR BLD AUTO: 2.6 % (ref 0.3–6.2)
ERYTHROCYTE [DISTWIDTH] IN BLOOD BY AUTOMATED COUNT: 12.1 % (ref 12.3–15.4)
GFR SERPL CREATININE-BSD FRML MDRD: 78 ML/MIN/1.73
GLOBULIN UR ELPH-MCNC: 3.1 GM/DL
GLUCOSE SERPL-MCNC: 94 MG/DL (ref 65–99)
HCT VFR BLD AUTO: 43 % (ref 37.5–51)
HDLC SERPL-MCNC: 44 MG/DL (ref 40–60)
HGB BLD-MCNC: 15 G/DL (ref 13–17.7)
IMM GRANULOCYTES # BLD AUTO: 0.04 10*3/MM3 (ref 0–0.05)
IMM GRANULOCYTES NFR BLD AUTO: 0.4 % (ref 0–0.5)
LDLC SERPL CALC-MCNC: 93 MG/DL (ref 0–100)
LDLC/HDLC SERPL: 2.06 {RATIO}
LYMPHOCYTES # BLD AUTO: 2.39 10*3/MM3 (ref 0.7–3.1)
LYMPHOCYTES NFR BLD AUTO: 23.4 % (ref 19.6–45.3)
MCH RBC QN AUTO: 31.8 PG (ref 26.6–33)
MCHC RBC AUTO-ENTMCNC: 34.9 G/DL (ref 31.5–35.7)
MCV RBC AUTO: 91.3 FL (ref 79–97)
MONOCYTES # BLD AUTO: 0.88 10*3/MM3 (ref 0.1–0.9)
MONOCYTES NFR BLD AUTO: 8.6 % (ref 5–12)
NEUTROPHILS NFR BLD AUTO: 6.6 10*3/MM3 (ref 1.7–7)
NEUTROPHILS NFR BLD AUTO: 64.6 % (ref 42.7–76)
NRBC BLD AUTO-RTO: 0 /100 WBC (ref 0–0.2)
PLATELET # BLD AUTO: 487 10*3/MM3 (ref 140–450)
PMV BLD AUTO: 9.6 FL (ref 6–12)
POTASSIUM SERPL-SCNC: 3.8 MMOL/L (ref 3.5–5.2)
PROT SERPL-MCNC: 7.6 G/DL (ref 6–8.5)
PSA SERPL-MCNC: 1.07 NG/ML (ref 0–4)
RBC # BLD AUTO: 4.71 10*6/MM3 (ref 4.14–5.8)
SODIUM SERPL-SCNC: 134 MMOL/L (ref 136–145)
T-UPTAKE NFR SERPL: 1.08 TBI (ref 0.8–1.3)
T4 SERPL-MCNC: 7.64 MCG/DL (ref 4.5–11.7)
TRIGL SERPL-MCNC: 112 MG/DL (ref 0–150)
TSH SERPL DL<=0.05 MIU/L-ACNC: 5.7 UIU/ML (ref 0.27–4.2)
VLDLC SERPL-MCNC: 20 MG/DL (ref 5–40)
WBC # BLD AUTO: 10.22 10*3/MM3 (ref 3.4–10.8)

## 2021-05-18 PROCEDURE — 84443 ASSAY THYROID STIM HORMONE: CPT | Performed by: FAMILY MEDICINE

## 2021-05-18 PROCEDURE — 80053 COMPREHEN METABOLIC PANEL: CPT | Performed by: FAMILY MEDICINE

## 2021-05-18 PROCEDURE — 80061 LIPID PANEL: CPT | Performed by: FAMILY MEDICINE

## 2021-05-18 PROCEDURE — 73030 X-RAY EXAM OF SHOULDER: CPT

## 2021-05-18 PROCEDURE — 84479 ASSAY OF THYROID (T3 OR T4): CPT | Performed by: FAMILY MEDICINE

## 2021-05-18 PROCEDURE — 36415 COLL VENOUS BLD VENIPUNCTURE: CPT | Performed by: FAMILY MEDICINE

## 2021-05-18 PROCEDURE — G0103 PSA SCREENING: HCPCS | Performed by: FAMILY MEDICINE

## 2021-05-18 PROCEDURE — 84436 ASSAY OF TOTAL THYROXINE: CPT | Performed by: FAMILY MEDICINE

## 2021-05-18 PROCEDURE — 99214 OFFICE O/P EST MOD 30 MIN: CPT | Performed by: FAMILY MEDICINE

## 2021-05-18 PROCEDURE — 85025 COMPLETE CBC W/AUTO DIFF WBC: CPT | Performed by: FAMILY MEDICINE

## 2021-05-18 RX ORDER — AMLODIPINE BESYLATE 5 MG/1
5 TABLET ORAL DAILY
COMMUNITY
Start: 2021-02-24 | End: 2021-08-30 | Stop reason: SDUPTHER

## 2021-05-18 RX ORDER — DICLOFENAC SODIUM 20 MG/G
1 SOLUTION TOPICAL 2 TIMES DAILY PRN
Qty: 112 G | Refills: 11 | Status: SHIPPED | OUTPATIENT
Start: 2021-05-18 | End: 2022-11-29

## 2021-05-18 NOTE — PROGRESS NOTES
"Chief Complaint  follow up to hypertension and follow up to hypothyroidism    Subjective          Yohan Lawler presents to [facility]  History of Present Illness    Patient states that he started experiencing left shoulder pain.  He went to the urgent care over the weekend, when he had elevated blood pressure.  He states that he was checked an EKG and he was cleared.  It appears that his pain was all musculoskeletal related.  He has taken some ibuprofen and Flexeril which does provide him some relief.  He is not doing much else to it.  His range of motion is a bit decreased.  He does have pain when he is moving his left arm anteriorly.  Also when he is raising it above his head.  He denies any particular trauma to the area.  He is not sure if he slept wrong on his side which may have triggered this.    Patient also has hypothyroidism.  Is currently on levothyroxine 150 mcg daily.  He denies any side effects of the medicine.    Patient also has essential hypertension.  Blood pressure at today's of visit 120/84.  Is current taking amlodipine 5 mg daily.  Is also taking losartan 100 mg daily.  He is also taking hydrochlorothiazide 25 mg daily.  He denies any side effects of this medicine.        Objective   Vital Signs:   /84 (BP Location: Left arm, Patient Position: Sitting, Cuff Size: Adult)   Pulse 90   Ht 177.8 cm (70\")   Wt 98.5 kg (217 lb 1.6 oz)   SpO2 99%   BMI 31.15 kg/m²     Physical Exam  Vitals and nursing note reviewed.   Constitutional:       Appearance: He is well-developed.   HENT:      Head: Normocephalic and atraumatic.   Musculoskeletal:        Arms:       Cervical back: Normal range of motion and neck supple.   Neurological:      Mental Status: He is alert and oriented to person, place, and time.   Psychiatric:         Behavior: Behavior normal.        Result Review :                 Assessment and Plan    Diagnoses and all orders for this visit:    1. " Acute pain of left shoulder (Primary)  -     XR Shoulder 2+ View Left  -     Diclofenac Sodium (Pennsaid) 2 % solution; Apply 1 application topically 2 (Two) Times a Day As Needed (pain).  Dispense: 112 g; Refill: 11    2. Hypothyroidism, unspecified type  -     Thyroid Panel With TSH    3. Essential hypertension  -     Comprehensive Metabolic Panel  -     CBC & Differential  -     Lipid Panel With LDL / HDL Ratio    4. Screening for prostate cancer  -     PSA Screen      Patient left shoulder, he can take Pennsaid for the area.  He can also take continue his ibuprofen and Flexeril.  We will also get an x-ray of patient's left shoulder.  He may benefit from some physical therapy.  For his hypothyroidism, continue levothyroxine 150 mg daily.  We will check a thyroid panel.  For his essential hypertension, continue amlodipine 5 mg daily, losartan 100 mg daily, and hydrochlorothiazide 25 mg daily.      Follow Up   No follow-ups on file.  Patient was given instructions and counseling regarding his condition or for health maintenance advice. Please see specific information pulled into the AVS if appropriate.

## 2021-05-19 DIAGNOSIS — K21.9 GASTROESOPHAGEAL REFLUX DISEASE WITHOUT ESOPHAGITIS: ICD-10-CM

## 2021-05-20 DIAGNOSIS — E03.9 HYPOTHYROIDISM, UNSPECIFIED TYPE: Primary | ICD-10-CM

## 2021-05-20 RX ORDER — LEVOTHYROXINE SODIUM 150 MCG
150 TABLET ORAL DAILY
Qty: 90 TABLET | Refills: 3 | Status: SHIPPED | OUTPATIENT
Start: 2021-05-20 | End: 2021-11-02

## 2021-05-20 NOTE — PROGRESS NOTES
Please inform the patient of the following abnormal results. Lets switch the levothyroxine to branded 150 such as synthroid or tirosint. And if its elevated TSH in one month, will need to increase the dose.

## 2021-05-21 ENCOUNTER — TELEPHONE (OUTPATIENT)
Dept: INTERNAL MEDICINE | Facility: CLINIC | Age: 66
End: 2021-05-21

## 2021-05-23 RX ORDER — OMEPRAZOLE 40 MG/1
40 CAPSULE, DELAYED RELEASE ORAL DAILY
Qty: 90 CAPSULE | Refills: 3 | Status: SHIPPED | OUTPATIENT
Start: 2021-05-23 | End: 2022-07-14

## 2021-05-28 ENCOUNTER — TELEPHONE (OUTPATIENT)
Dept: INTERNAL MEDICINE | Facility: CLINIC | Age: 66
End: 2021-05-28

## 2021-05-28 RX ORDER — CYCLOBENZAPRINE HCL 10 MG
10 TABLET ORAL 3 TIMES DAILY PRN
Qty: 42 TABLET | Refills: 0 | Status: SHIPPED | OUTPATIENT
Start: 2021-05-28 | End: 2021-06-21 | Stop reason: SDUPTHER

## 2021-05-28 NOTE — TELEPHONE ENCOUNTER
Caller: Yohan Lawler    Relationship: Self    Best call back number: [phone]    What is the medical concern/diagnosis: NECK PROBLEMS    What specialty or service is being requested: [facility]    Any additional details: PATIENT WOULD LIKE A PLACE NEAR THE [facility]    PLEASE CALL AND ADVISE

## 2021-06-21 ENCOUNTER — OFFICE VISIT (OUTPATIENT)
Dept: INTERNAL MEDICINE | Facility: CLINIC | Age: 66
End: 2021-06-21

## 2021-06-21 ENCOUNTER — LAB (OUTPATIENT)
Dept: LAB | Facility: HOSPITAL | Age: 66
End: 2021-06-21

## 2021-06-21 VITALS
SYSTOLIC BLOOD PRESSURE: 122 MMHG | RESPIRATION RATE: 16 BRPM | HEART RATE: 84 BPM | OXYGEN SATURATION: 97 % | WEIGHT: 197.5 LBS | DIASTOLIC BLOOD PRESSURE: 90 MMHG | BODY MASS INDEX: 28.34 KG/M2

## 2021-06-21 DIAGNOSIS — S32.030G CLOSED COMPRESSION FRACTURE OF L3 LUMBAR VERTEBRA WITH DELAYED HEALING, SUBSEQUENT ENCOUNTER: Primary | ICD-10-CM

## 2021-06-21 DIAGNOSIS — E03.9 HYPOTHYROIDISM, UNSPECIFIED TYPE: ICD-10-CM

## 2021-06-21 DIAGNOSIS — I10 ESSENTIAL HYPERTENSION: ICD-10-CM

## 2021-06-21 LAB
ALBUMIN SERPL-MCNC: 4.4 G/DL (ref 3.5–5.2)
ALBUMIN/GLOB SERPL: 1.6 G/DL
ALP SERPL-CCNC: 102 U/L (ref 39–117)
ALT SERPL W P-5'-P-CCNC: 30 U/L (ref 1–41)
ANION GAP SERPL CALCULATED.3IONS-SCNC: 9.2 MMOL/L (ref 5–15)
AST SERPL-CCNC: 21 U/L (ref 1–40)
BASOPHILS # BLD AUTO: 0.04 10*3/MM3 (ref 0–0.2)
BASOPHILS NFR BLD AUTO: 0.5 % (ref 0–1.5)
BILIRUB SERPL-MCNC: 0.5 MG/DL (ref 0–1.2)
BUN SERPL-MCNC: 8 MG/DL (ref 8–23)
BUN/CREAT SERPL: 9.5 (ref 7–25)
CALCIUM SPEC-SCNC: 9.4 MG/DL (ref 8.6–10.5)
CHLORIDE SERPL-SCNC: 95 MMOL/L (ref 98–107)
CO2 SERPL-SCNC: 26.8 MMOL/L (ref 22–29)
CREAT SERPL-MCNC: 0.84 MG/DL (ref 0.76–1.27)
DEPRECATED RDW RBC AUTO: 41.2 FL (ref 37–54)
EOSINOPHIL # BLD AUTO: 0.44 10*3/MM3 (ref 0–0.4)
EOSINOPHIL NFR BLD AUTO: 5.5 % (ref 0.3–6.2)
ERYTHROCYTE [DISTWIDTH] IN BLOOD BY AUTOMATED COUNT: 12.1 % (ref 12.3–15.4)
GFR SERPL CREATININE-BSD FRML MDRD: 91 ML/MIN/1.73
GLOBULIN UR ELPH-MCNC: 2.7 GM/DL
GLUCOSE SERPL-MCNC: 93 MG/DL (ref 65–99)
HCT VFR BLD AUTO: 44.5 % (ref 37.5–51)
HGB BLD-MCNC: 15.4 G/DL (ref 13–17.7)
IMM GRANULOCYTES # BLD AUTO: 0.04 10*3/MM3 (ref 0–0.05)
IMM GRANULOCYTES NFR BLD AUTO: 0.5 % (ref 0–0.5)
LYMPHOCYTES # BLD AUTO: 2.09 10*3/MM3 (ref 0.7–3.1)
LYMPHOCYTES NFR BLD AUTO: 26.2 % (ref 19.6–45.3)
MCH RBC QN AUTO: 32.1 PG (ref 26.6–33)
MCHC RBC AUTO-ENTMCNC: 34.6 G/DL (ref 31.5–35.7)
MCV RBC AUTO: 92.7 FL (ref 79–97)
MONOCYTES # BLD AUTO: 0.6 10*3/MM3 (ref 0.1–0.9)
MONOCYTES NFR BLD AUTO: 7.5 % (ref 5–12)
NEUTROPHILS NFR BLD AUTO: 4.78 10*3/MM3 (ref 1.7–7)
NEUTROPHILS NFR BLD AUTO: 59.8 % (ref 42.7–76)
NRBC BLD AUTO-RTO: 0 /100 WBC (ref 0–0.2)
PLATELET # BLD AUTO: 479 10*3/MM3 (ref 140–450)
PMV BLD AUTO: 9.7 FL (ref 6–12)
POTASSIUM SERPL-SCNC: 4 MMOL/L (ref 3.5–5.2)
PROT SERPL-MCNC: 7.1 G/DL (ref 6–8.5)
RBC # BLD AUTO: 4.8 10*6/MM3 (ref 4.14–5.8)
SODIUM SERPL-SCNC: 131 MMOL/L (ref 136–145)
T-UPTAKE NFR SERPL: 0.95 TBI (ref 0.8–1.3)
T4 SERPL-MCNC: 9.41 MCG/DL (ref 4.5–11.7)
TSH SERPL DL<=0.05 MIU/L-ACNC: 1.97 UIU/ML (ref 0.27–4.2)
WBC # BLD AUTO: 7.99 10*3/MM3 (ref 3.4–10.8)

## 2021-06-21 PROCEDURE — 84436 ASSAY OF TOTAL THYROXINE: CPT

## 2021-06-21 PROCEDURE — 99214 OFFICE O/P EST MOD 30 MIN: CPT | Performed by: FAMILY MEDICINE

## 2021-06-21 PROCEDURE — 80053 COMPREHEN METABOLIC PANEL: CPT | Performed by: FAMILY MEDICINE

## 2021-06-21 PROCEDURE — 36415 COLL VENOUS BLD VENIPUNCTURE: CPT | Performed by: FAMILY MEDICINE

## 2021-06-21 PROCEDURE — 85025 COMPLETE CBC W/AUTO DIFF WBC: CPT | Performed by: FAMILY MEDICINE

## 2021-06-21 PROCEDURE — 84479 ASSAY OF THYROID (T3 OR T4): CPT

## 2021-06-21 PROCEDURE — 84443 ASSAY THYROID STIM HORMONE: CPT

## 2021-06-21 RX ORDER — HYDROCODONE BITARTRATE AND ACETAMINOPHEN 5; 325 MG/1; MG/1
1 TABLET ORAL EVERY 6 HOURS PRN
Qty: 60 TABLET | Refills: 0 | Status: SHIPPED | OUTPATIENT
Start: 2021-06-21 | End: 2021-07-30 | Stop reason: SDUPTHER

## 2021-06-21 RX ORDER — CYCLOBENZAPRINE HCL 10 MG
10 TABLET ORAL 3 TIMES DAILY PRN
Qty: 42 TABLET | Refills: 0 | Status: SHIPPED | OUTPATIENT
Start: 2021-06-21 | End: 2021-08-24 | Stop reason: SDUPTHER

## 2021-06-21 NOTE — PROGRESS NOTES
"Chief Complaint  Fall (at home 2 weeks ago, fractured 2 vertabrae)    Subjective          Yohan Lawler presents to [facility]  History of Present Illness    About a week ago, patient states that he went to the emergency room due to a fall.  Patient he fell off his patio onto some stone ground.  Patient states that he was taken to the emergency room where he was found to have a compression fracture lumbar spine.  In the emergency room a CT scan was done which showed: \"IMPRESSION: 1. There is a mild acute compression fracture of the L3 superior endplate. This is approximately 20% compressed. 2. There is a chronic L4 compression fracture. 3. Again demonstrated is 14 mm anterolisthesis of L5 on S1 secondary to bilateral pars defects.\"  He states that he was referred to a neurosurgeon, but cannot see them for at least couple weeks.  Patient would like another referral.  Patient states that he is taking Flexeril muscle relaxant which seems to be helping with his pain particular at night.  He does have some hydrocodone 5 mg tablets which he does take, which also help him with some pain, however he does feel very drowsy on the medicine.    Patient has a history of having hypothyroidism.  Patient is currently taking Synthroid 150 mcg daily.  He denies any side effects of the medication.    Patient also has essential hypertension.  Blood pressure is 122/90.  He is currently on hydrochlorothiazide 25 mg daily, losartan 100 mg daily.  Patient is also taking amlodipine 5 mg daily.  Patient denies any side effects of the medication.    Objective   Vital Signs:   /90   Pulse 84   Resp 16   Wt 89.6 kg (197 lb 8 oz)   SpO2 97%   BMI 28.34 kg/m²     Physical Exam  Vitals and nursing note reviewed.   Constitutional:       Appearance: He is well-developed.   HENT:      Head: Normocephalic and atraumatic.   Musculoskeletal:      Cervical back: Normal range of motion and neck supple. "   Neurological:      Mental Status: He is alert and oriented to person, place, and time.   Psychiatric:         Behavior: Behavior normal.        Result Review :                 Assessment and Plan    Diagnoses and all orders for this visit:    1. Closed compression fracture of L3 lumbar vertebra with delayed healing, subsequent encounter (Primary)  -     Ambulatory Referral to Orthopedic Surgery  -     HYDROcodone-acetaminophen (Norco) 5-325 MG per tablet; Take 1 tablet by mouth Every 6 (Six) Hours As Needed for Moderate Pain .  Dispense: 60 tablet; Refill: 0  -     Comprehensive Metabolic Panel  -     CBC & Differential  -     We will continue patient on [place].  Will refer to orthopedic surgery.    2. Hypothyroidism, unspecified type  -     Thyroid Panel With TSH  -     Continue Synthroid high 50 mcg daily.    3. Essential hypertension  -     Comprehensive Metabolic Panel  -     CBC & Differential  -     Stable, continue current blood pressure medications.    Other orders  -     cyclobenzaprine (FLEXERIL) 10 MG tablet; Take 1 tablet by mouth 3 (Three) Times a Day As Needed for Muscle Spasms.  Dispense: 42 tablet; Refill: 0        Follow Up   No follow-ups on file.  Patient was given instructions and counseling regarding his condition or for health maintenance advice. Please see specific information pulled into the AVS if appropriate.

## 2021-06-23 NOTE — PROGRESS NOTES
Please inform the patient of the following abnormal results.   Continues to have low sodium, but is stable.

## 2021-07-30 DIAGNOSIS — S32.030G CLOSED COMPRESSION FRACTURE OF L3 LUMBAR VERTEBRA WITH DELAYED HEALING, SUBSEQUENT ENCOUNTER: ICD-10-CM

## 2021-07-30 RX ORDER — HYDROCODONE BITARTRATE AND ACETAMINOPHEN 5; 325 MG/1; MG/1
1 TABLET ORAL EVERY 6 HOURS PRN
Qty: 60 TABLET | Refills: 0 | Status: SHIPPED | OUTPATIENT
Start: 2021-07-30 | End: 2021-08-24 | Stop reason: SDUPTHER

## 2021-08-24 ENCOUNTER — OFFICE VISIT (OUTPATIENT)
Dept: INTERNAL MEDICINE | Facility: CLINIC | Age: 66
End: 2021-08-24

## 2021-08-24 ENCOUNTER — OFFICE VISIT (OUTPATIENT)
Dept: ORTHOPEDIC SURGERY | Facility: CLINIC | Age: 66
End: 2021-08-24

## 2021-08-24 VITALS
HEIGHT: 70 IN | OXYGEN SATURATION: 98 % | DIASTOLIC BLOOD PRESSURE: 80 MMHG | HEART RATE: 80 BPM | SYSTOLIC BLOOD PRESSURE: 126 MMHG | WEIGHT: 220.1 LBS | BODY MASS INDEX: 31.51 KG/M2

## 2021-08-24 VITALS — WEIGHT: 210 LBS | TEMPERATURE: 97 F | HEIGHT: 70 IN | BODY MASS INDEX: 30.06 KG/M2

## 2021-08-24 DIAGNOSIS — M43.16 SPONDYLOLISTHESIS OF LUMBAR REGION: ICD-10-CM

## 2021-08-24 DIAGNOSIS — M54.50 LUMBAR PAIN: Primary | ICD-10-CM

## 2021-08-24 DIAGNOSIS — Z09 ENCOUNTER FOR FOLLOW-UP EXAMINATION AFTER COMPLETED TREATMENT FOR CONDITIONS OTHER THAN MALIGNANT NEOPLASM: ICD-10-CM

## 2021-08-24 DIAGNOSIS — S32.039D CLOSED FRACTURE OF THIRD LUMBAR VERTEBRA WITH ROUTINE HEALING, UNSPECIFIED FRACTURE MORPHOLOGY, SUBSEQUENT ENCOUNTER: Primary | ICD-10-CM

## 2021-08-24 DIAGNOSIS — M89.9 DISORDER OF BONE, UNSPECIFIED: ICD-10-CM

## 2021-08-24 DIAGNOSIS — S32.030S COMPRESSION FRACTURE OF L3 LUMBAR VERTEBRA, SEQUELA: ICD-10-CM

## 2021-08-24 DIAGNOSIS — S32.030G CLOSED COMPRESSION FRACTURE OF L3 LUMBAR VERTEBRA WITH DELAYED HEALING, SUBSEQUENT ENCOUNTER: ICD-10-CM

## 2021-08-24 DIAGNOSIS — S32.030A COMPRESSION FRACTURE OF L3 VERTEBRA, INITIAL ENCOUNTER (HCC): ICD-10-CM

## 2021-08-24 PROCEDURE — 72100 X-RAY EXAM L-S SPINE 2/3 VWS: CPT | Performed by: ORTHOPAEDIC SURGERY

## 2021-08-24 PROCEDURE — 99214 OFFICE O/P EST MOD 30 MIN: CPT | Performed by: FAMILY MEDICINE

## 2021-08-24 PROCEDURE — 99204 OFFICE O/P NEW MOD 45 MIN: CPT | Performed by: ORTHOPAEDIC SURGERY

## 2021-08-24 RX ORDER — METHYLPREDNISOLONE 4 MG/1
TABLET ORAL
Qty: 21 TABLET | Refills: 0 | Status: SHIPPED | OUTPATIENT
Start: 2021-08-24 | End: 2021-11-03 | Stop reason: SDUPTHER

## 2021-08-24 RX ORDER — HYDROCODONE BITARTRATE AND ACETAMINOPHEN 5; 325 MG/1; MG/1
1 TABLET ORAL EVERY 6 HOURS PRN
Qty: 60 TABLET | Refills: 0 | Status: SHIPPED | OUTPATIENT
Start: 2021-08-24 | End: 2022-11-29 | Stop reason: HOSPADM

## 2021-08-24 RX ORDER — PREDNISOLONE ACETATE 10 MG/ML
1 SUSPENSION/ DROPS OPHTHALMIC DAILY
COMMUNITY
Start: 2021-08-02 | End: 2023-03-26 | Stop reason: SDUPTHER

## 2021-08-24 RX ORDER — CYCLOBENZAPRINE HCL 10 MG
10 TABLET ORAL 3 TIMES DAILY PRN
Qty: 42 TABLET | Refills: 0 | Status: SHIPPED | OUTPATIENT
Start: 2021-08-24 | End: 2021-10-22

## 2021-08-24 NOTE — PROGRESS NOTES
"Chief Complaint  needs to have a bone density scan    Subjective          Yohan Lawler presents to [facility]  History of Present Illness    Has a history having vertebral fractures to L3.  Patient states that he is currently seeing orthopedics regarding this.  He is currently in his back brace.  He states that he is concerned about his bone density.  He was asked to try to get a bone density.  Patient would like to get a bone density.    Occasionally still does have some pain.  He does state that the Flexeril along with the Norco that he currently takes does seem to help him with the pain.  He denies any side effects of these medications.    Objective   Vital Signs:   /80 (BP Location: Left arm, Patient Position: Sitting, Cuff Size: Large Adult)   Pulse 80   Ht 177.8 cm (70\")   Wt 99.8 kg (220 lb 1.6 oz)   SpO2 98%   BMI 31.58 kg/m²     Physical Exam  Vitals and nursing note reviewed.   Constitutional:       Appearance: He is well-developed.   HENT:      Head: Normocephalic and atraumatic.   Musculoskeletal:      Cervical back: Normal range of motion and neck supple.      Comments: Currently when a back brace.   Neurological:      Mental Status: He is alert and oriented to person, place, and time.   Psychiatric:         Behavior: Behavior normal.        Result Review :                 Assessment and Plan    Diagnoses and all orders for this visit:    1. Closed fracture of third lumbar vertebra with routine healing, unspecified fracture morphology, subsequent encounter (Primary)  -     DEXA Bone Density Axial    2. Compression fracture of L3 lumbar vertebra, sequela  -     DEXA Bone Density Axial  -     Vitamin D 25 Hydroxy  -     Comprehensive Metabolic Panel    3. Encounter for follow-up examination after completed treatment for conditions other than malignant neoplasm   -     DEXA Bone Density Axial    4. Disorder of bone, unspecified   -     Vitamin D 25 " Hydroxy    5. Closed compression fracture of L3 lumbar vertebra with delayed healing, subsequent encounter  -     HYDROcodone-acetaminophen (Norco) 5-325 MG per tablet; Take 1 tablet by mouth Every 6 (Six) Hours As Needed for Moderate Pain .  Dispense: 60 tablet; Refill: 0    Other orders  -     cyclobenzaprine (FLEXERIL) 10 MG tablet; Take 1 tablet by mouth 3 (Three) Times a Day As Needed for Muscle Spasms.  Dispense: 42 tablet; Refill: 0      Recommend patient do some physical therapy.  Would also like him to get a DEXA scan.  I also discussed with him the refill the Norco as well as the Flexeril.  He does need to continue to follow-up with orthopedics.      Follow Up   No follow-ups on file.  Patient was given instructions and counseling regarding his condition or for health maintenance advice. Please see specific information pulled into the AVS if appropriate.

## 2021-08-24 NOTE — PROGRESS NOTES
New patient or new problem visit    CC: L3 fracture    HPI: He fell in June sustaining an L3 fracture and has been managed in a Yi brace which was applied in the ER.  He has some radiating pain in the right thigh to the knee no bowel or bladder complaints past history of back issues including back and leg pain and previous fracture at L4    PFSH: See attached    ROS: No fever chills or weight loss    PE (he is wearing a Viking brace.  The back is nontender.  Good strength in the legs bilaterally and sensations intact but reflexes are absent.      XRAY:  Plain film x-rays of the lumbar spine show healed fractures of L3 and L4 and L4 was present back in April whereas L3 was not.  He has a grade 2 spondylolisthesis at L5-S1 but overall good lumbar lordosis.  No comparison views are available but descriptions thereof suggest no change in appearance of the L4 fracture    Other: n/a    Impression: L3 compression fracture, 2 months healed in good position with good lordosis.  Previous L4 compression fracture well healed and L5-S1 isthmic spondylolisthesis grade 2    Plan: He is doing well I am going to wean him from the brace in a month and see him back in 6 weeks with x-rays of the lumbar spine for final visit.  We will call in a Medrol Dosepak meantime for the leg pain and obtain MRI later if symptoms persist

## 2021-08-29 ENCOUNTER — PATIENT MESSAGE (OUTPATIENT)
Dept: INTERNAL MEDICINE | Facility: CLINIC | Age: 66
End: 2021-08-29

## 2021-08-30 RX ORDER — AMLODIPINE BESYLATE 5 MG/1
5 TABLET ORAL DAILY
Qty: 90 TABLET | Refills: 1 | Status: SHIPPED | OUTPATIENT
Start: 2021-08-30 | End: 2021-12-13 | Stop reason: SDUPTHER

## 2021-08-30 NOTE — TELEPHONE ENCOUNTER
From: Yohan Lawler  To: Rusty Hickey MD  Sent: 8/29/2021 11:24 AM EDT  Subject: Prescription Question    Please refill my prescription for (amlodipine 5mg)  My blood pressure medication   Thank You

## 2021-10-25 RX ORDER — CYCLOBENZAPRINE HCL 10 MG
TABLET ORAL
Qty: 90 TABLET | Refills: 0 | Status: SHIPPED | OUTPATIENT
Start: 2021-10-25 | End: 2022-01-18

## 2021-11-02 DIAGNOSIS — E03.9 HYPOTHYROIDISM, UNSPECIFIED TYPE: ICD-10-CM

## 2021-11-02 DIAGNOSIS — I10 ESSENTIAL HYPERTENSION: ICD-10-CM

## 2021-11-02 RX ORDER — LEVOTHYROXINE SODIUM 0.15 MG/1
TABLET ORAL
Qty: 90 TABLET | Refills: 0 | Status: SHIPPED | OUTPATIENT
Start: 2021-11-02 | End: 2021-12-13 | Stop reason: SDUPTHER

## 2021-11-02 RX ORDER — HYDROCHLOROTHIAZIDE 25 MG/1
TABLET ORAL
Qty: 90 TABLET | Refills: 0 | Status: SHIPPED | OUTPATIENT
Start: 2021-11-02 | End: 2021-12-13 | Stop reason: SDUPTHER

## 2021-11-02 RX ORDER — LOSARTAN POTASSIUM 100 MG/1
TABLET ORAL
Qty: 90 TABLET | Refills: 0 | Status: SHIPPED | OUTPATIENT
Start: 2021-11-02 | End: 2021-12-13 | Stop reason: SDUPTHER

## 2021-11-03 ENCOUNTER — OFFICE VISIT (OUTPATIENT)
Dept: INTERNAL MEDICINE | Facility: CLINIC | Age: 66
End: 2021-11-03

## 2021-11-03 VITALS
OXYGEN SATURATION: 98 % | SYSTOLIC BLOOD PRESSURE: 138 MMHG | WEIGHT: 203 LBS | RESPIRATION RATE: 16 BRPM | DIASTOLIC BLOOD PRESSURE: 72 MMHG | BODY MASS INDEX: 29.06 KG/M2 | HEART RATE: 89 BPM | HEIGHT: 70 IN | TEMPERATURE: 97.8 F

## 2021-11-03 DIAGNOSIS — I10 ESSENTIAL HYPERTENSION: ICD-10-CM

## 2021-11-03 DIAGNOSIS — E03.9 HYPOTHYROIDISM, UNSPECIFIED TYPE: Primary | ICD-10-CM

## 2021-11-03 DIAGNOSIS — M54.31 SCIATICA OF RIGHT SIDE: ICD-10-CM

## 2021-11-03 PROBLEM — H91.90 HEARING LOSS: Status: ACTIVE | Noted: 2021-11-03

## 2021-11-03 PROBLEM — K63.5 POLYP OF COLON: Status: ACTIVE | Noted: 2021-11-03

## 2021-11-03 PROBLEM — K21.9 ESOPHAGEAL REFLUX: Status: ACTIVE | Noted: 2021-11-03

## 2021-11-03 PROCEDURE — 99214 OFFICE O/P EST MOD 30 MIN: CPT | Performed by: FAMILY MEDICINE

## 2021-11-03 RX ORDER — METHYLPREDNISOLONE 4 MG/1
TABLET ORAL
Qty: 21 TABLET | Refills: 0 | Status: SHIPPED | OUTPATIENT
Start: 2021-11-03 | End: 2021-12-13

## 2021-11-03 NOTE — PROGRESS NOTES
"Chief Complaint  Hypothyroidism and Hypertension    Subjective          Yohan Lawler presents to [facility]  History of Present Illness    Patient history having hypothyroidism.  Patient is currently on levothyroxine 150 mcg daily.  He denies any side effects of the medication.    Patient does have essential hypertension.  Blood pressure is 138/72.  Currently taking losartan 100 mg daily.  Patient is also taking hydrochlorothiazide 25 mg daily.  Patient is also taking amlodipine 5 mg daily.  Denies any side effects of the medicine.    Patient also has a history of having sciatica.  He states that sciatica started to flare back up.  In the past he has had a Medrol pack along with an epidural.  But he does note that his right side is starting to flareup.  He does take Flexeril sometimes at night, along with using the Pennsaid.  But this seems to be giving him some issues currently.    Objective   Vital Signs:   /72   Pulse 89   Temp 97.8 °F (36.6 °C) (Infrared)   Resp 16   Ht 177.8 cm (70\")   Wt 92.1 kg (203 lb)   SpO2 98%   BMI 29.13 kg/m²     Physical Exam  Vitals and nursing note reviewed.   Constitutional:       Appearance: He is well-developed.   HENT:      Head: Normocephalic and atraumatic.   Musculoskeletal:      Cervical back: Normal range of motion and neck supple.   Neurological:      Mental Status: He is alert and oriented to person, place, and time.   Psychiatric:         Behavior: Behavior normal.        Result Review :                 Assessment and Plan    Diagnoses and all orders for this visit:    1. Hypothyroidism, unspecified type (Primary)  -     Thyroid Panel With TSH  -     Continue levothyroxine 150mcg daily.    2. Essential hypertension  -     Comprehensive Metabolic Panel  -     CBC & Differential  -     Continue losartan and amlodipine.    3. Sciatica of right side  -     methylPREDNISolone (MEDROL) 4 MG dose pack; Use as directed by package " instructions  Dispense: 21 tablet; Refill: 0  -     Start on medrol dose pack.         Follow Up   No follow-ups on file.  Patient was given instructions and counseling regarding his condition or for health maintenance advice. Please see specific information pulled into the AVS if appropriate.

## 2021-11-12 ENCOUNTER — IMMUNIZATION (OUTPATIENT)
Dept: VACCINE CLINIC | Facility: HOSPITAL | Age: 66
End: 2021-11-12

## 2021-11-12 PROCEDURE — 0004A ADM SARSCOV2 30MCG/0.3ML BOOSTER: CPT | Performed by: INTERNAL MEDICINE

## 2021-11-12 PROCEDURE — 91300 HC SARSCOV02 VAC 30MCG/0.3ML IM: CPT | Performed by: INTERNAL MEDICINE

## 2021-12-13 ENCOUNTER — HOSPITAL ENCOUNTER (OUTPATIENT)
Dept: BONE DENSITY | Facility: HOSPITAL | Age: 66
Discharge: HOME | End: 2021-12-13

## 2021-12-13 ENCOUNTER — OFFICE VISIT (OUTPATIENT)
Dept: INTERNAL MEDICINE | Facility: CLINIC | Age: 66
End: 2021-12-13

## 2021-12-13 ENCOUNTER — LAB (OUTPATIENT)
Dept: LAB | Facility: HOSPITAL | Age: 66
End: 2021-12-13

## 2021-12-13 VITALS
SYSTOLIC BLOOD PRESSURE: 136 MMHG | OXYGEN SATURATION: 97 % | WEIGHT: 205 LBS | DIASTOLIC BLOOD PRESSURE: 78 MMHG | BODY MASS INDEX: 29.35 KG/M2 | HEIGHT: 70 IN | HEART RATE: 88 BPM | RESPIRATION RATE: 18 BRPM

## 2021-12-13 DIAGNOSIS — E03.9 HYPOTHYROIDISM, UNSPECIFIED TYPE: Primary | ICD-10-CM

## 2021-12-13 DIAGNOSIS — I10 ESSENTIAL HYPERTENSION: ICD-10-CM

## 2021-12-13 LAB
25(OH)D3 SERPL-MCNC: 60.5 NG/ML
ALBUMIN SERPL-MCNC: 4.3 G/DL (ref 3.5–5.2)
ALBUMIN/GLOB SERPL: 1.5 G/DL
ALP SERPL-CCNC: 88 U/L (ref 39–117)
ALT SERPL W P-5'-P-CCNC: 29 U/L (ref 1–41)
ANION GAP SERPL CALCULATED.3IONS-SCNC: 8.2 MMOL/L (ref 5–15)
AST SERPL-CCNC: 20 U/L (ref 1–40)
BASOPHILS # BLD AUTO: 0.05 10*3/MM3 (ref 0–0.2)
BASOPHILS NFR BLD AUTO: 0.6 % (ref 0–1.5)
BILIRUB SERPL-MCNC: 0.4 MG/DL (ref 0–1.2)
BUN SERPL-MCNC: 7 MG/DL (ref 8–23)
BUN/CREAT SERPL: 9.9 (ref 7–25)
CALCIUM SPEC-SCNC: 9.6 MG/DL (ref 8.6–10.5)
CHLORIDE SERPL-SCNC: 97 MMOL/L (ref 98–107)
CO2 SERPL-SCNC: 27.8 MMOL/L (ref 22–29)
CREAT SERPL-MCNC: 0.71 MG/DL (ref 0.76–1.27)
DEPRECATED RDW RBC AUTO: 41.8 FL (ref 37–54)
EOSINOPHIL # BLD AUTO: 0.38 10*3/MM3 (ref 0–0.4)
EOSINOPHIL NFR BLD AUTO: 4.9 % (ref 0.3–6.2)
ERYTHROCYTE [DISTWIDTH] IN BLOOD BY AUTOMATED COUNT: 12.2 % (ref 12.3–15.4)
GFR SERPL CREATININE-BSD FRML MDRD: 110 ML/MIN/1.73
GLOBULIN UR ELPH-MCNC: 2.9 GM/DL
GLUCOSE SERPL-MCNC: 105 MG/DL (ref 65–99)
HCT VFR BLD AUTO: 45.7 % (ref 37.5–51)
HGB BLD-MCNC: 16 G/DL (ref 13–17.7)
IMM GRANULOCYTES # BLD AUTO: 0.05 10*3/MM3 (ref 0–0.05)
IMM GRANULOCYTES NFR BLD AUTO: 0.6 % (ref 0–0.5)
LYMPHOCYTES # BLD AUTO: 1.53 10*3/MM3 (ref 0.7–3.1)
LYMPHOCYTES NFR BLD AUTO: 19.8 % (ref 19.6–45.3)
MCH RBC QN AUTO: 32.7 PG (ref 26.6–33)
MCHC RBC AUTO-ENTMCNC: 35 G/DL (ref 31.5–35.7)
MCV RBC AUTO: 93.3 FL (ref 79–97)
MONOCYTES # BLD AUTO: 0.68 10*3/MM3 (ref 0.1–0.9)
MONOCYTES NFR BLD AUTO: 8.8 % (ref 5–12)
NEUTROPHILS NFR BLD AUTO: 5.02 10*3/MM3 (ref 1.7–7)
NEUTROPHILS NFR BLD AUTO: 65.3 % (ref 42.7–76)
NRBC BLD AUTO-RTO: 0.1 /100 WBC (ref 0–0.2)
PLATELET # BLD AUTO: 440 10*3/MM3 (ref 140–450)
PMV BLD AUTO: 9.3 FL (ref 6–12)
POTASSIUM SERPL-SCNC: 4.1 MMOL/L (ref 3.5–5.2)
PROT SERPL-MCNC: 7.2 G/DL (ref 6–8.5)
RBC # BLD AUTO: 4.9 10*6/MM3 (ref 4.14–5.8)
SODIUM SERPL-SCNC: 133 MMOL/L (ref 136–145)
T-UPTAKE NFR SERPL: 1.07 TBI (ref 0.8–1.3)
T4 SERPL-MCNC: 8.62 MCG/DL (ref 4.5–11.7)
TSH SERPL DL<=0.05 MIU/L-ACNC: 1.14 UIU/ML (ref 0.27–4.2)
WBC NRBC COR # BLD: 7.71 10*3/MM3 (ref 3.4–10.8)

## 2021-12-13 PROCEDURE — 84436 ASSAY OF TOTAL THYROXINE: CPT | Performed by: FAMILY MEDICINE

## 2021-12-13 PROCEDURE — 80053 COMPREHEN METABOLIC PANEL: CPT | Performed by: FAMILY MEDICINE

## 2021-12-13 PROCEDURE — 36415 COLL VENOUS BLD VENIPUNCTURE: CPT | Performed by: FAMILY MEDICINE

## 2021-12-13 PROCEDURE — 77080 DXA BONE DENSITY AXIAL: CPT

## 2021-12-13 PROCEDURE — 85025 COMPLETE CBC W/AUTO DIFF WBC: CPT | Performed by: FAMILY MEDICINE

## 2021-12-13 PROCEDURE — 99214 OFFICE O/P EST MOD 30 MIN: CPT | Performed by: FAMILY MEDICINE

## 2021-12-13 PROCEDURE — 82306 VITAMIN D 25 HYDROXY: CPT | Performed by: FAMILY MEDICINE

## 2021-12-13 PROCEDURE — 84443 ASSAY THYROID STIM HORMONE: CPT | Performed by: FAMILY MEDICINE

## 2021-12-13 PROCEDURE — 84479 ASSAY OF THYROID (T3 OR T4): CPT | Performed by: FAMILY MEDICINE

## 2021-12-13 RX ORDER — LEVOTHYROXINE SODIUM 0.15 MG/1
150 TABLET ORAL DAILY
Qty: 90 TABLET | Refills: 3 | Status: SHIPPED | OUTPATIENT
Start: 2021-12-13 | End: 2023-01-19 | Stop reason: SDUPTHER

## 2021-12-13 RX ORDER — HYDROCHLOROTHIAZIDE 25 MG/1
25 TABLET ORAL DAILY
Qty: 90 TABLET | Refills: 3 | Status: SHIPPED | OUTPATIENT
Start: 2021-12-13 | End: 2022-09-11 | Stop reason: SDUPTHER

## 2021-12-13 RX ORDER — AMLODIPINE BESYLATE 5 MG/1
5 TABLET ORAL DAILY
Qty: 90 TABLET | Refills: 1 | Status: SHIPPED | OUTPATIENT
Start: 2021-12-13 | End: 2022-08-22

## 2021-12-13 RX ORDER — LOSARTAN POTASSIUM 100 MG/1
100 TABLET ORAL DAILY
Qty: 90 TABLET | Refills: 3 | Status: SHIPPED | OUTPATIENT
Start: 2021-12-13 | End: 2022-01-18 | Stop reason: SDUPTHER

## 2021-12-13 NOTE — PROGRESS NOTES
"Chief Complaint  Hypothyroidism (follow-up)    Subjective          Yohan Lawler presents to [facility]  History of Present Illness    Patient presents at today's office visit with a history of hypothyroidism.  Patient is currently taking levothyroxine 50 mcg daily.  He denies any side effects of the medication.    Patient does have essential hypertension.  Blood pressure 136/78.  He is current taking hydrochlorothiazide 25 mg daily, amlodipine 5 mg daily, as well as losartan 100 mg daily.  Patient denies any side effects of the medication.    Objective   Vital Signs:   /78   Pulse 88   Resp 18   Ht 177.8 cm (70\")   Wt 93 kg (205 lb)   SpO2 97%   BMI 29.41 kg/m²     Physical Exam  Vitals and nursing note reviewed.   Constitutional:       Appearance: He is well-developed.   HENT:      Head: Normocephalic and atraumatic.   Musculoskeletal:      Cervical back: Normal range of motion and neck supple.   Neurological:      Mental Status: He is alert and oriented to person, place, and time.   Psychiatric:         Behavior: Behavior normal.        Result Review :                 Assessment and Plan    Diagnoses and all orders for this visit:    1. Hypothyroidism, unspecified type (Primary)  -     Thyroid Panel With TSH  -     levothyroxine (SYNTHROID, LEVOTHROID) 150 MCG tablet; Take 1 tablet by mouth Daily.  Dispense: 90 tablet; Refill: 3    2. Essential hypertension  -     Comprehensive Metabolic Panel  -     CBC & Differential  -     hydroCHLOROthiazide (HYDRODIURIL) 25 MG tablet; Take 1 tablet by mouth Daily.  Dispense: 90 tablet; Refill: 3  -     losartan (COZAAR) 100 MG tablet; Take 1 tablet by mouth Daily.  Dispense: 90 tablet; Refill: 3    Other orders  -     amLODIPine (NORVASC) 5 MG tablet; Take 1 tablet by mouth Daily.  Dispense: 90 tablet; Refill: 1    Essential hypertension is currently stable, continue current blood pressure medicines.  For the hypothyroidism, " continue levothyroxine 150 mg daily.      Follow Up   No follow-ups on file.  Patient was given instructions and counseling regarding his condition or for health maintenance advice. Please see specific information pulled into the AVS if appropriate.

## 2022-01-18 DIAGNOSIS — I10 ESSENTIAL HYPERTENSION: ICD-10-CM

## 2022-01-18 RX ORDER — CYCLOBENZAPRINE HCL 10 MG
TABLET ORAL
Qty: 90 TABLET | Refills: 0 | Status: SHIPPED | OUTPATIENT
Start: 2022-01-18

## 2022-01-19 RX ORDER — LOSARTAN POTASSIUM 100 MG/1
100 TABLET ORAL DAILY
Qty: 90 TABLET | Refills: 1 | Status: SHIPPED | OUTPATIENT
Start: 2022-01-19

## 2022-03-15 ENCOUNTER — OFFICE VISIT (OUTPATIENT)
Dept: INTERNAL MEDICINE | Facility: CLINIC | Age: 67
End: 2022-03-15

## 2022-03-15 ENCOUNTER — LAB (OUTPATIENT)
Dept: LAB | Facility: HOSPITAL | Age: 67
End: 2022-03-15

## 2022-03-15 VITALS
WEIGHT: 221.6 LBS | SYSTOLIC BLOOD PRESSURE: 118 MMHG | HEART RATE: 90 BPM | OXYGEN SATURATION: 97 % | DIASTOLIC BLOOD PRESSURE: 84 MMHG | BODY MASS INDEX: 31.73 KG/M2 | HEIGHT: 70 IN

## 2022-03-15 DIAGNOSIS — J30.9 ALLERGIC RHINITIS, UNSPECIFIED SEASONALITY, UNSPECIFIED TRIGGER: ICD-10-CM

## 2022-03-15 DIAGNOSIS — Z00.00 MEDICARE ANNUAL WELLNESS VISIT, SUBSEQUENT: Primary | ICD-10-CM

## 2022-03-15 DIAGNOSIS — M25.511 BILATERAL SHOULDER PAIN, UNSPECIFIED CHRONICITY: ICD-10-CM

## 2022-03-15 DIAGNOSIS — L98.9 SKIN LESION: ICD-10-CM

## 2022-03-15 DIAGNOSIS — M25.512 BILATERAL SHOULDER PAIN, UNSPECIFIED CHRONICITY: ICD-10-CM

## 2022-03-15 DIAGNOSIS — Z12.5 SCREENING FOR PROSTATE CANCER: ICD-10-CM

## 2022-03-15 DIAGNOSIS — Z00.00 HEALTHCARE MAINTENANCE: ICD-10-CM

## 2022-03-15 DIAGNOSIS — I10 ESSENTIAL HYPERTENSION: ICD-10-CM

## 2022-03-15 DIAGNOSIS — E03.9 HYPOTHYROIDISM, UNSPECIFIED TYPE: ICD-10-CM

## 2022-03-15 LAB
ALBUMIN SERPL-MCNC: 4.7 G/DL (ref 3.5–5.2)
ALBUMIN/GLOB SERPL: 1.7 G/DL
ALP SERPL-CCNC: 81 U/L (ref 39–117)
ALT SERPL W P-5'-P-CCNC: 38 U/L (ref 1–41)
ANION GAP SERPL CALCULATED.3IONS-SCNC: 11 MMOL/L (ref 5–15)
AST SERPL-CCNC: 26 U/L (ref 1–40)
BACTERIA UR QL AUTO: NORMAL /HPF
BASOPHILS # BLD AUTO: 0.03 10*3/MM3 (ref 0–0.2)
BASOPHILS NFR BLD AUTO: 0.4 % (ref 0–1.5)
BILIRUB SERPL-MCNC: 0.7 MG/DL (ref 0–1.2)
BILIRUB UR QL STRIP: NEGATIVE
BUN SERPL-MCNC: 8 MG/DL (ref 8–23)
BUN/CREAT SERPL: 9 (ref 7–25)
CALCIUM SPEC-SCNC: 9.5 MG/DL (ref 8.6–10.5)
CHLORIDE SERPL-SCNC: 96 MMOL/L (ref 98–107)
CHOLEST SERPL-MCNC: 159 MG/DL (ref 0–200)
CLARITY UR: CLEAR
CO2 SERPL-SCNC: 27 MMOL/L (ref 22–29)
COLOR UR: YELLOW
CREAT SERPL-MCNC: 0.89 MG/DL (ref 0.76–1.27)
DEPRECATED RDW RBC AUTO: 40.2 FL (ref 37–54)
EGFRCR SERPLBLD CKD-EPI 2021: 93.3 ML/MIN/1.73
EOSINOPHIL # BLD AUTO: 0.28 10*3/MM3 (ref 0–0.4)
EOSINOPHIL NFR BLD AUTO: 4 % (ref 0.3–6.2)
ERYTHROCYTE [DISTWIDTH] IN BLOOD BY AUTOMATED COUNT: 11.9 % (ref 12.3–15.4)
GLOBULIN UR ELPH-MCNC: 2.7 GM/DL
GLUCOSE SERPL-MCNC: 125 MG/DL (ref 65–99)
GLUCOSE UR STRIP-MCNC: NEGATIVE MG/DL
HBA1C MFR BLD: 5.8 % (ref 4.8–5.6)
HCT VFR BLD AUTO: 47.6 % (ref 37.5–51)
HDLC SERPL-MCNC: 44 MG/DL (ref 40–60)
HGB BLD-MCNC: 16.7 G/DL (ref 13–17.7)
HGB UR QL STRIP.AUTO: NEGATIVE
HYALINE CASTS UR QL AUTO: NORMAL /LPF
IMM GRANULOCYTES # BLD AUTO: 0.03 10*3/MM3 (ref 0–0.05)
IMM GRANULOCYTES NFR BLD AUTO: 0.4 % (ref 0–0.5)
KETONES UR QL STRIP: NEGATIVE
LDLC SERPL CALC-MCNC: 94 MG/DL (ref 0–100)
LDLC/HDLC SERPL: 2.1 {RATIO}
LEUKOCYTE ESTERASE UR QL STRIP.AUTO: NEGATIVE
LYMPHOCYTES # BLD AUTO: 1.75 10*3/MM3 (ref 0.7–3.1)
LYMPHOCYTES NFR BLD AUTO: 24.7 % (ref 19.6–45.3)
MCH RBC QN AUTO: 32.2 PG (ref 26.6–33)
MCHC RBC AUTO-ENTMCNC: 35.1 G/DL (ref 31.5–35.7)
MCV RBC AUTO: 91.9 FL (ref 79–97)
MONOCYTES # BLD AUTO: 0.6 10*3/MM3 (ref 0.1–0.9)
MONOCYTES NFR BLD AUTO: 8.5 % (ref 5–12)
NEUTROPHILS NFR BLD AUTO: 4.39 10*3/MM3 (ref 1.7–7)
NEUTROPHILS NFR BLD AUTO: 62 % (ref 42.7–76)
NITRITE UR QL STRIP: NEGATIVE
NRBC BLD AUTO-RTO: 0 /100 WBC (ref 0–0.2)
PH UR STRIP.AUTO: 7.5 [PH] (ref 5–8)
PLATELET # BLD AUTO: 405 10*3/MM3 (ref 140–450)
PMV BLD AUTO: 9.7 FL (ref 6–12)
POTASSIUM SERPL-SCNC: 3.9 MMOL/L (ref 3.5–5.2)
PROT SERPL-MCNC: 7.4 G/DL (ref 6–8.5)
PROT UR QL STRIP: NEGATIVE
PSA SERPL-MCNC: 1.18 NG/ML (ref 0–4)
RBC # BLD AUTO: 5.18 10*6/MM3 (ref 4.14–5.8)
RBC # UR STRIP: NORMAL /HPF
REF LAB TEST METHOD: NORMAL
SODIUM SERPL-SCNC: 134 MMOL/L (ref 136–145)
SP GR UR STRIP: 1.01 (ref 1–1.03)
SQUAMOUS #/AREA URNS HPF: NORMAL /HPF
T-UPTAKE NFR SERPL: 1.06 TBI (ref 0.8–1.3)
T4 SERPL-MCNC: 9.09 MCG/DL (ref 4.5–11.7)
TRIGL SERPL-MCNC: 114 MG/DL (ref 0–150)
TSH SERPL DL<=0.05 MIU/L-ACNC: 0.32 UIU/ML (ref 0.27–4.2)
UROBILINOGEN UR QL STRIP: NORMAL
VLDLC SERPL-MCNC: 21 MG/DL (ref 5–40)
WBC # UR STRIP: NORMAL /HPF
WBC NRBC COR # BLD: 7.08 10*3/MM3 (ref 3.4–10.8)

## 2022-03-15 PROCEDURE — G0103 PSA SCREENING: HCPCS | Performed by: FAMILY MEDICINE

## 2022-03-15 PROCEDURE — 80061 LIPID PANEL: CPT | Performed by: FAMILY MEDICINE

## 2022-03-15 PROCEDURE — 99214 OFFICE O/P EST MOD 30 MIN: CPT | Performed by: FAMILY MEDICINE

## 2022-03-15 PROCEDURE — G0439 PPPS, SUBSEQ VISIT: HCPCS | Performed by: FAMILY MEDICINE

## 2022-03-15 PROCEDURE — 36415 COLL VENOUS BLD VENIPUNCTURE: CPT | Performed by: FAMILY MEDICINE

## 2022-03-15 PROCEDURE — 80053 COMPREHEN METABOLIC PANEL: CPT | Performed by: FAMILY MEDICINE

## 2022-03-15 PROCEDURE — 1170F FXNL STATUS ASSESSED: CPT | Performed by: FAMILY MEDICINE

## 2022-03-15 PROCEDURE — 1159F MED LIST DOCD IN RCRD: CPT | Performed by: FAMILY MEDICINE

## 2022-03-15 PROCEDURE — 1125F AMNT PAIN NOTED PAIN PRSNT: CPT | Performed by: FAMILY MEDICINE

## 2022-03-15 PROCEDURE — 84436 ASSAY OF TOTAL THYROXINE: CPT | Performed by: FAMILY MEDICINE

## 2022-03-15 PROCEDURE — 85025 COMPLETE CBC W/AUTO DIFF WBC: CPT | Performed by: FAMILY MEDICINE

## 2022-03-15 PROCEDURE — 81001 URINALYSIS AUTO W/SCOPE: CPT | Performed by: FAMILY MEDICINE

## 2022-03-15 PROCEDURE — 83036 HEMOGLOBIN GLYCOSYLATED A1C: CPT | Performed by: FAMILY MEDICINE

## 2022-03-15 PROCEDURE — 84443 ASSAY THYROID STIM HORMONE: CPT | Performed by: FAMILY MEDICINE

## 2022-03-15 PROCEDURE — 84479 ASSAY OF THYROID (T3 OR T4): CPT | Performed by: FAMILY MEDICINE

## 2022-03-15 RX ORDER — FLUTICASONE PROPIONATE 50 MCG
2 SPRAY, SUSPENSION (ML) NASAL DAILY
Qty: 18 ML | Refills: 5 | Status: SHIPPED | OUTPATIENT
Start: 2022-03-15 | End: 2022-12-08 | Stop reason: SDUPTHER

## 2022-03-15 NOTE — PROGRESS NOTES
"The ABCs of the Annual Wellness Visit  Subsequent Medicare Wellness Visit    Chief Complaint   Patient presents with   • follow up to hypothyroidism   • follow up to hypertension   • Medicare Wellness-subsequent   • Allergies      Subjective    History of Present Illness:  Yohan Lawler is a 66 y.o. male who presents for a Subsequent Medicare Wellness Visit.    The patient has consented to being recorded using CLAUD[facility].    The patient presents today to follow up on his high blood pressure, as well as hypothyroidism. He does have a history of having hypothyroidism. He's currently taking levothyroxine 150 mcg daily.     He does have a history of having essential hypertension and is currently taking amlodipine 5 mg daily, hydrochlorothiazide 25 mg daily, and losartan 100 mg daily. He seems to be doing fairly well with that because his blood pressure is 118/84 mmHg today.    Allergies   The patient complains of allergies to trees. He is currently taking Zyrtec 10 mg daily. He is not using a nasal steroid and requests a prescription of this today. He has tried to use Flonase in the past, but this caused nosebleeds. The patient believes he may have been over-using this nasal spray when he tried it initially.     Skin lesion  He has a skin lesion present to the left side of his scalp. He has not yet had this evaluated by dermatology and requests a referral today.     Weight gain  The patient has admittedly been consuming a lot of unhealthy foods, as he is \"addicted to sweets.\"  He would like to have his blood glucose tested today. He does not monitor this at home. He has been trying to stay active by participating in aerobic exercise in the mornings. The patient also reports eating a healthy breakfast with prune juice to help him have a bowel movement.     Bilateral shoulder pain  He also endorses bilateral shoulder pain and denies one side being more severe than the other. He is amenable to participating in physical therapy " for this. He uses stretches for his back pain with improvement.       The following portions of the patient's history were reviewed and   updated as appropriate: allergies, current medications, past family history, past medical history, past social history, past surgical history and problem list.    Compared to one year ago, the patient feels his physical   health is the same.    Compared to one year ago, the patient feels his mental   health is worse.    Recent Hospitalizations:  He was not admitted to the hospital during the last year.       Current Medical Providers:  Patient Care Team:  Rusty Hickey MD as PCP - General (Family Medicine)    Outpatient Medications Prior to Visit   Medication Sig Dispense Refill   • amLODIPine (NORVASC) 5 MG tablet Take 1 tablet by mouth Daily. 90 tablet 1   • Apoaequorin (PREVAGEN PO) Take 1 tablet by mouth Daily.     • aspirin 81 MG tablet Take 81 mg by mouth Daily.     • cyclobenzaprine (FLEXERIL) 10 MG tablet Take 1 tablet by mouth three times daily as needed for muscle spasm 90 tablet 0   • Diclofenac Sodium (Pennsaid) 2 % solution Apply 1 application topically 2 (Two) Times a Day As Needed (pain). 112 g 11   • hydroCHLOROthiazide (HYDRODIURIL) 25 MG tablet Take 1 tablet by mouth Daily. 90 tablet 3   • levothyroxine (SYNTHROID, LEVOTHROID) 150 MCG tablet Take 1 tablet by mouth Daily. 90 tablet 3   • losartan (COZAAR) 100 MG tablet Take 1 tablet by mouth Daily. 90 tablet 1   • MULTIPLE VITAMINS-MINERALS ER PO Take 1 tablet by mouth Daily.     • Omega-3 Fatty Acids (FISH OIL) 1000 MG capsule capsule Take 1,000 mg by mouth Daily With Breakfast.     • omeprazole (priLOSEC) 40 MG capsule Take 1 capsule by mouth Daily. 90 capsule 3   • prednisoLONE acetate (PRED FORTE) 1 % ophthalmic suspension Administer 1 drop into the left eye Daily.     • diphenhydrAMINE-APAP, sleep,  MG/30ML liquid Take  by mouth.     • HYDROcodone-acetaminophen (Norco) 5-325 MG per tablet Take 1  "tablet by mouth Every 6 (Six) Hours As Needed for Moderate Pain . 60 tablet 0   • ibuprofen (ADVIL,MOTRIN) 800 MG tablet Take 1 tablet by mouth Every 8 (Eight) Hours As Needed for Mild Pain . 30 tablet 0     No facility-administered medications prior to visit.       Opioid medication/s are on active medication list.  and I have evaluated his active treatment plan and pain score trends (see table).  Vitals:    03/15/22 0758   PainSc:   4     I have reviewed the chart for potential of high risk medication and harmful drug interactions in the elderly.            Aspirin is on active medication list. Aspirin use is indicated based on review of current medical condition/s. Pros and cons of this therapy have been discussed today. Benefits of this medication outweigh potential harm.  Patient has been encouraged to continue taking this medication.  .      Patient Active Problem List   Diagnosis   • Essential hypertension   • Hypothyroidism   • GERD (gastroesophageal reflux disease)   • Blood glucose elevated   • Bursitis   • Carpal tunnel syndrome   • Tingling   • Benign essential HTN   • Personal history of colonic polyps   • Polyp of colon   • Hearing loss   • Esophageal reflux     Advance Care Planning  Advance Directive is not on file.  ACP discussion was held with the patient during this visit. Patient does not have an advance directive, declines further assistance.          Objective    Vitals:    03/15/22 0758   BP: 118/84   BP Location: Left arm   Patient Position: Sitting   Cuff Size: Large Adult   Pulse: 90   SpO2: 97%   Weight: 101 kg (221 lb 9.6 oz)   Height: 177.8 cm (70\")   PainSc:   4     BMI Readings from Last 1 Encounters:   03/15/22 31.80 kg/m²   BMI is above normal parameters. Recommendations include: exercise counseling and nutrition counseling    Does the patient have evidence of cognitive impairment? No    Physical Exam  Vitals and nursing note reviewed.   Constitutional:       Appearance: He is " well-developed.   HENT:      Head: Normocephalic and atraumatic.      Right Ear: External ear normal.      Left Ear: External ear normal.   Cardiovascular:      Rate and Rhythm: Normal rate and regular rhythm.      Heart sounds: Normal heart sounds.   Pulmonary:      Effort: Pulmonary effort is normal. No respiratory distress.      Breath sounds: Normal breath sounds.   Abdominal:      Palpations: Abdomen is soft.      Tenderness: There is no abdominal tenderness. There is no guarding.   Musculoskeletal:         General: Normal range of motion.      Cervical back: Normal range of motion and neck supple.   Lymphadenopathy:      Cervical: No cervical adenopathy.   Skin:     General: Skin is warm.      Comments: Skin lesion present on the left side of his head on his scalp.   Neurological:      Mental Status: He is alert and oriented to person, place, and time.   Psychiatric:         Behavior: Behavior normal.                 HEALTH RISK ASSESSMENT    Smoking Status:  Social History     Tobacco Use   Smoking Status Never Smoker   Smokeless Tobacco Never Used     Alcohol Consumption:  Social History     Substance and Sexual Activity   Alcohol Use Yes   • Alcohol/week: 12.0 standard drinks   • Types: 12 Cans of beer per week     Fall Risk Screen:    [facility] Fall Risk Assessment was completed, and patient is at HIGH risk for falls. Assessment completed on:3/15/2022    Depression Screening:  PHQ-2/PHQ-9 Depression Screening 3/15/2022   Retired Total Score -   Little Interest or Pleasure in Doing Things 0-->not at all   Feeling Down, Depressed or Hopeless 0-->not at all   PHQ-9: Brief Depression Severity Measure Score 0       Health Habits and Functional and Cognitive Screening:  Functional & Cognitive Status 3/15/2022   Do you have difficulty preparing food and eating? No   Do you have difficulty bathing yourself, getting dressed or grooming yourself? No   Do you have difficulty using the toilet? No   Do you have difficulty  moving around from place to place? No   Do you have trouble with steps or getting out of a bed or a chair? No   Current Diet Frequent Junk Food   Dental Exam Not up to date   Eye Exam Up to date   Exercise (times per week) 5 times per week   Current Exercises Include Stationary Bicycling/Spin Class   Current Exercise Activities Include -   Do you need help using the phone?  No   Are you deaf or do you have serious difficulty hearing?  Yes   Do you need help with transportation? No   Do you need help shopping? No   Do you need help preparing meals?  No   Do you need help with housework?  No   Do you need help with laundry? No   Do you need help taking your medications? No   Do you need help managing money? No   Do you ever drive or ride in a car without wearing a seat belt? No   Have you felt unusual stress, anger or loneliness in the last month? Yes   Who do you live with? Spouse   If you need help, do you have trouble finding someone available to you? No   Have you been bothered in the last four weeks by sexual problems? No   Do you have difficulty concentrating, remembering or making decisions? No       Age-appropriate Screening Schedule:  Refer to the list below for future screening recommendations based on patient's age, sex and/or medical conditions. Orders for these recommended tests are listed in the plan section. The patient has been provided with a written plan.    Health Maintenance   Topic Date Due   • ZOSTER VACCINE (1 of 2) Never done   • INFLUENZA VACCINE  08/01/2021   • TDAP/TD VACCINES (3 - Td or Tdap) 08/05/2026              Assessment/Plan   CMS Preventative Services Quick Reference  Risk Factors Identified During Encounter  None Identified  The above risks/problems have been discussed with the patient.  Follow up actions/plans if indicated are seen below in the Assessment/Plan Section.  Pertinent information has been shared with the patient in the After Visit Summary.    Diagnoses and all orders  for this visit:    1. Medicare annual wellness visit, subsequent (Primary)    2. Hypothyroidism, unspecified type    3. Essential hypertension    4. Allergic rhinitis, unspecified seasonality, unspecified trigger  -     fluticasone (Flonase) 50 MCG/ACT nasal spray; 2 sprays into the nostril(s) as directed by provider Daily.  Dispense: 18 mL; Refill: 5    5. Bilateral shoulder pain, unspecified chronicity  -     Ambulatory Referral to Physical Therapy Evaluate and treat    6. Skin lesion  -     Ambulatory Referral to Dermatology    7. Healthcare maintenance  -     CBC & Differential  -     Comprehensive Metabolic Panel  -     Hemoglobin A1c  -     Thyroid Panel With TSH  -     Lipid Panel With LDL / HDL Ratio  -     Cancel: Urine Culture - Urine, Urine, Clean Catch  -     Urinalysis With Microscopic - Urine, Clean Catch    8. Screening for prostate cancer  -     PSA Screen      1. Skin lesion  - A referral to dermatology was placed today.     2. Allergies  - He will continue Zyrtec and add Flonase to his regimen    3. Hypertension  - Continue current medications, amlodipine, losartan, and and hydrochlorothiazide. Currently stable.     4. Hypothyroidism  - Continue levothyroxine. Thyroid panel ordered today.     5. Bilateral shoulder pain  - A referral to physical therapy was placed today.     6. Prostate cancer screening  - PSA is ordered today.     7. Healthcare maintenance  - CBC, CMP, A1c, thyroid panel, and lipid panel ordered today. Continue lifestyle changes with diet and exercise.     Follow Up:   No follow-ups on file.     An After Visit Summary and PPPS were made available to the patient.                 Transcribed from ambient dictation for Rusty Hickey MD by Leslie Gautam.  03/15/22   11:09 EDT    Patient verbalized consent to the visit recording.  I have personally performed the services described in this document as transcribed by the above individual, and it is both accurate and complete.  Rusty  ANGIE Hickey MD  3/27/2022  11:20 EDT

## 2022-03-17 NOTE — PROGRESS NOTES
Please inform the patient of the following abnormal results. Sodium is low but stable.  Care     none    Deena Cordova MD, Christina Goodman  Attending Emergency  Physician             Deena Cordova MD  07/10/21 [id number]

## 2022-07-14 DIAGNOSIS — K21.9 GASTROESOPHAGEAL REFLUX DISEASE WITHOUT ESOPHAGITIS: ICD-10-CM

## 2022-07-14 RX ORDER — OMEPRAZOLE 40 MG/1
CAPSULE, DELAYED RELEASE ORAL
Qty: 90 CAPSULE | Refills: 0 | Status: SHIPPED | OUTPATIENT
Start: 2022-07-14 | End: 2022-10-15 | Stop reason: SDUPTHER

## 2022-07-15 ENCOUNTER — OFFICE VISIT (OUTPATIENT)
Dept: INTERNAL MEDICINE | Facility: CLINIC | Age: 67
End: 2022-07-15

## 2022-07-15 VITALS
HEART RATE: 89 BPM | HEIGHT: 70 IN | SYSTOLIC BLOOD PRESSURE: 110 MMHG | BODY MASS INDEX: 32.21 KG/M2 | OXYGEN SATURATION: 97 % | DIASTOLIC BLOOD PRESSURE: 82 MMHG | WEIGHT: 225 LBS | TEMPERATURE: 97.2 F

## 2022-07-15 DIAGNOSIS — R05.9 COUGH: ICD-10-CM

## 2022-07-15 DIAGNOSIS — J06.9 ACUTE URI: Primary | ICD-10-CM

## 2022-07-15 DIAGNOSIS — J01.00 ACUTE NON-RECURRENT MAXILLARY SINUSITIS: ICD-10-CM

## 2022-07-15 DIAGNOSIS — H93.8X3 CONGESTION OF BOTH EARS: ICD-10-CM

## 2022-07-15 LAB
EXPIRATION DATE: NORMAL
FLUAV AG UPPER RESP QL IA.RAPID: NOT DETECTED
FLUBV AG UPPER RESP QL IA.RAPID: NOT DETECTED
INTERNAL CONTROL: NORMAL
Lab: NORMAL
SARS-COV-2 AG UPPER RESP QL IA.RAPID: NOT DETECTED

## 2022-07-15 PROCEDURE — 87428 SARSCOV & INF VIR A&B AG IA: CPT | Performed by: FAMILY MEDICINE

## 2022-07-15 PROCEDURE — 99213 OFFICE O/P EST LOW 20 MIN: CPT | Performed by: FAMILY MEDICINE

## 2022-07-15 RX ORDER — AMOXICILLIN 500 MG/1
500 CAPSULE ORAL 2 TIMES DAILY
Qty: 14 CAPSULE | Refills: 0 | Status: SHIPPED | OUTPATIENT
Start: 2022-07-15 | End: 2022-07-22

## 2022-07-24 NOTE — PROGRESS NOTES
"Chief Complaint  Nasal Congestion, Cough, and Sore Throat    Subjective        Yohan Lawler presents to [facility]  History of Present Illness    She does present at today's office with some nasal congestion, cough, and sore throat.  We did do a COVID-19 test on him which was negative along with the flu test being negative.  He does have some congestion particularly in the maxillary sinuses.  This is been going on for several days now perhaps almost a week.    Objective   Vital Signs:  /82 (BP Location: Left arm, Patient Position: Sitting, Cuff Size: Adult)   Pulse 89   Temp 97.2 °F (36.2 °C)   Ht 177.8 cm (70\")   Wt 102 kg (225 lb)   SpO2 97%   BMI 32.28 kg/m²   Estimated body mass index is 32.28 kg/m² as calculated from the following:    Height as of this encounter: 177.8 cm (70\").    Weight as of this encounter: 102 kg (225 lb).          Physical Exam  Vitals and nursing note reviewed.   Constitutional:       Appearance: He is well-developed.   HENT:      Head: Normocephalic and atraumatic.   Musculoskeletal:      Cervical back: Normal range of motion and neck supple.   Neurological:      Mental Status: He is alert and oriented to person, place, and time.   Psychiatric:         Behavior: Behavior normal.        Result Review :                Assessment and Plan   Diagnoses and all orders for this visit:    1. Acute URI (Primary)    2. Congestion of both ears    3. Acute non-recurrent maxillary sinusitis  -     amoxicillin (AMOXIL) 500 MG capsule; Take 1 capsule by mouth 2 (Two) Times a Day for 7 days.  Dispense: 14 capsule; Refill: 0    4. Cough  -     POCT SARS-CoV-2 Antigen MAKSIM + Flu    Most likely has sinusitis, I did discuss with him that we will start him on amoxicillin to help him with this.  He can do over-the-counter sinus rinses as well as over-the-counter medications as well to help with symptoms.         Follow Up   No follow-ups on file.  Patient was " given instructions and counseling regarding his condition or for health maintenance advice. Please see specific information pulled into the AVS if appropriate.

## 2022-08-22 RX ORDER — AMLODIPINE BESYLATE 5 MG/1
TABLET ORAL
Qty: 90 TABLET | Refills: 2 | Status: SHIPPED | OUTPATIENT
Start: 2022-08-22 | End: 2023-02-03 | Stop reason: SDUPTHER

## 2022-09-11 DIAGNOSIS — I10 ESSENTIAL HYPERTENSION: ICD-10-CM

## 2022-09-12 RX ORDER — HYDROCHLOROTHIAZIDE 25 MG/1
25 TABLET ORAL DAILY
Qty: 90 TABLET | Refills: 3 | Status: SHIPPED | OUTPATIENT
Start: 2022-09-12 | End: 2023-02-03 | Stop reason: SDUPTHER

## 2022-10-07 DIAGNOSIS — Z23 FLU VACCINE NEED: Primary | ICD-10-CM

## 2022-10-07 PROCEDURE — G0008 ADMIN INFLUENZA VIRUS VAC: HCPCS | Performed by: FAMILY MEDICINE

## 2022-10-07 PROCEDURE — 90662 IIV NO PRSV INCREASED AG IM: CPT | Performed by: FAMILY MEDICINE

## 2022-10-15 DIAGNOSIS — K21.9 GASTROESOPHAGEAL REFLUX DISEASE WITHOUT ESOPHAGITIS: ICD-10-CM

## 2022-10-17 RX ORDER — OMEPRAZOLE 40 MG/1
40 CAPSULE, DELAYED RELEASE ORAL DAILY
Qty: 90 CAPSULE | Refills: 0 | Status: SHIPPED | OUTPATIENT
Start: 2022-10-17 | End: 2023-01-19 | Stop reason: SDUPTHER

## 2022-11-15 ENCOUNTER — OFFICE VISIT (OUTPATIENT)
Dept: ORTHOPEDIC SURGERY | Facility: CLINIC | Age: 67
End: 2022-11-15

## 2022-11-15 VITALS — TEMPERATURE: 97.1 F | BODY MASS INDEX: 27.22 KG/M2 | HEIGHT: 70 IN | WEIGHT: 190.1 LBS

## 2022-11-15 DIAGNOSIS — M43.16 SPONDYLOLISTHESIS OF LUMBAR REGION: ICD-10-CM

## 2022-11-15 DIAGNOSIS — M54.50 LUMBAR PAIN: Primary | ICD-10-CM

## 2022-11-15 DIAGNOSIS — M48.062 SPINAL STENOSIS OF LUMBAR REGION WITH NEUROGENIC CLAUDICATION: ICD-10-CM

## 2022-11-15 PROCEDURE — 99213 OFFICE O/P EST LOW 20 MIN: CPT | Performed by: ORTHOPAEDIC SURGERY

## 2022-11-15 PROCEDURE — 72100 X-RAY EXAM L-S SPINE 2/3 VWS: CPT | Performed by: ORTHOPAEDIC SURGERY

## 2022-11-15 NOTE — PROGRESS NOTES
He is having some back and occasional radiating leg pain but overall doing very well.  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  MRI did show stenosis at 5 1 and he has these new fractures which are now healed at 3 and 4.  X-rays confirm healed fractures compared to prior films.  He requested epidural injections which have helped in the past and I certainly have no problem prescribing these.  It sounds like buttock pain L5 would be the likely culprit.  He is aware of the risk and benefits and I told him I do not think that it is excessively risky to try repeat epidurals even a longer term so long as he is not neurologically declining.  I will see him back as needed

## 2022-11-23 RX ORDER — AMOXICILLIN 500 MG/1
500 CAPSULE ORAL 2 TIMES DAILY
Qty: 14 CAPSULE | Refills: 0 | Status: SHIPPED | OUTPATIENT
Start: 2022-11-23 | End: 2022-11-29

## 2022-11-29 ENCOUNTER — HOSPITAL ENCOUNTER (OUTPATIENT)
Dept: PAIN MEDICINE | Facility: HOSPITAL | Age: 67
Discharge: HOME | End: 2022-11-29

## 2022-11-29 ENCOUNTER — ANESTHESIA EVENT (OUTPATIENT)
Dept: PAIN MEDICINE | Facility: HOSPITAL | Age: 67
End: 2022-11-29

## 2022-11-29 ENCOUNTER — HOSPITAL ENCOUNTER (OUTPATIENT)
Dept: GENERAL RADIOLOGY | Facility: HOSPITAL | Age: 67
Discharge: HOME | End: 2022-11-29

## 2022-11-29 ENCOUNTER — ANESTHESIA (OUTPATIENT)
Dept: PAIN MEDICINE | Facility: HOSPITAL | Age: 67
End: 2022-11-29

## 2022-11-29 VITALS
HEART RATE: 84 BPM | DIASTOLIC BLOOD PRESSURE: 84 MMHG | SYSTOLIC BLOOD PRESSURE: 126 MMHG | OXYGEN SATURATION: 95 % | BODY MASS INDEX: 26.48 KG/M2 | TEMPERATURE: 98.9 F | HEIGHT: 70 IN | RESPIRATION RATE: 16 BRPM | WEIGHT: 185 LBS

## 2022-11-29 DIAGNOSIS — M54.16 LUMBAR RADICULOPATHY: Primary | ICD-10-CM

## 2022-11-29 DIAGNOSIS — R52 PAIN: ICD-10-CM

## 2022-11-29 PROCEDURE — 25010000002 METHYLPREDNISOLONE PER 80 MG: Performed by: ANESTHESIOLOGY

## 2022-11-29 PROCEDURE — 77003 FLUOROGUIDE FOR SPINE INJECT: CPT

## 2022-11-29 PROCEDURE — 0 IOPAMIDOL 41 % SOLUTION: Performed by: ANESTHESIOLOGY

## 2022-11-29 RX ORDER — SODIUM CHLORIDE 0.9 % (FLUSH) 0.9 %
1-10 SYRINGE (ML) INJECTION AS NEEDED
Status: DISCONTINUED | OUTPATIENT
Start: 2022-11-29 | End: 2022-11-30 | Stop reason: HOSPADM

## 2022-11-29 RX ORDER — FENTANYL CITRATE 50 UG/ML
50 INJECTION, SOLUTION INTRAMUSCULAR; INTRAVENOUS AS NEEDED
Status: DISCONTINUED | OUTPATIENT
Start: 2022-11-29 | End: 2022-11-30 | Stop reason: HOSPADM

## 2022-11-29 RX ORDER — METHYLPREDNISOLONE ACETATE 80 MG/ML
80 INJECTION, SUSPENSION INTRA-ARTICULAR; INTRALESIONAL; INTRAMUSCULAR; SOFT TISSUE ONCE
Status: COMPLETED | OUTPATIENT
Start: 2022-11-29 | End: 2022-11-29

## 2022-11-29 RX ORDER — LIDOCAINE HYDROCHLORIDE 10 MG/ML
1 INJECTION, SOLUTION INFILTRATION; PERINEURAL ONCE AS NEEDED
Status: DISCONTINUED | OUTPATIENT
Start: 2022-11-29 | End: 2022-11-30 | Stop reason: HOSPADM

## 2022-11-29 RX ORDER — MIDAZOLAM HYDROCHLORIDE 1 MG/ML
1 INJECTION INTRAMUSCULAR; INTRAVENOUS AS NEEDED
Status: DISCONTINUED | OUTPATIENT
Start: 2022-11-29 | End: 2022-11-30 | Stop reason: HOSPADM

## 2022-11-29 RX ADMIN — IOPAMIDOL 10 ML: 408 INJECTION, SOLUTION INTRATHECAL at 11:47

## 2022-11-29 RX ADMIN — METHYLPREDNISOLONE ACETATE 80 MG: 80 INJECTION, SUSPENSION INTRA-ARTICULAR; INTRALESIONAL; INTRAMUSCULAR; SOFT TISSUE at 11:47

## 2022-11-29 NOTE — ANESTHESIA PROCEDURE NOTES
PAIN Epidural block      Patient reassessed immediately prior to procedure    Patient location during procedure: pain clinic  Indication:procedure for pain  Performed By  Anesthesiologist: Scottie Celeste MD  Preanesthetic Checklist  Completed: patient identified and risks and benefits discussed  Additional Notes  Diagnosis:     Post-Op Diagnosis Codes:     * Lumbar radiculopathy (M54.16)    Sedation:  none    A lumbar epidural steroid injection with fluoroscopic guidance and an Isovue dye epidurogram was performed.  Under fluoroscopic guidance, the epidural space was identified and accessed, confirmed by loss of resistance to saline followed by injection of Isovue dye, which shows a good epidurogram.  The above medications were injected uneventfully.    Prep:  Pt Position:prone  Sterile Tech:cap, gloves, mask and sterile barrier  Prep:chlorhexidine gluconate and isopropyl alcohol  Monitoring:blood pressure monitoring, continuous pulse oximetry and EKG  Procedure:Sedation: no     Approach:right paramedian  Guidance: fluoroscopy  Location:lumbar  Interspace: L5-S1.  Needle Type:Tuohy  Needle Gauge:20  Aspiration:negative  Medications:  Preservative Free Saline:1mL  Isovue:1mL  Comments:Isovue dye reveals good epidurogram  Depomedrol 80 mg  Post Assessment:  Pt Tolerance:patient tolerated the procedure well with no apparent complications  Complications:no

## 2022-11-29 NOTE — H&P
HISTORY:    Complains of low lumbar back pain radiating into his buttocks.  Constant timing but worse with activity.  Numb and tingling in nature.  Conservative therapy includes Flexeril Norco Advil  Current Outpatient Medications on File Prior to Encounter   Medication Sig Dispense Refill   • amLODIPine (NORVASC) 5 MG tablet Take 1 tablet by mouth once daily 90 tablet 2   • amoxicillin (AMOXIL) 500 MG capsule Take 1 capsule by mouth 2 (Two) Times a Day for 7 days. 14 capsule 0   • Apoaequorin (PREVAGEN PO) Take 1 tablet by mouth Daily.     • aspirin 81 MG tablet Take 81 mg by mouth Daily.     • cyclobenzaprine (FLEXERIL) 10 MG tablet Take 1 tablet by mouth three times daily as needed for muscle spasm 90 tablet 0   • Diclofenac Sodium (Pennsaid) 2 % solution Apply 1 application topically 2 (Two) Times a Day As Needed (pain). 112 g 11   • diphenhydrAMINE-APAP, sleep,  MG/30ML liquid Take  by mouth.     • fluticasone (Flonase) 50 MCG/ACT nasal spray 2 sprays into the nostril(s) as directed by provider Daily. 18 mL 5   • hydroCHLOROthiazide (HYDRODIURIL) 25 MG tablet Take 1 tablet by mouth Daily. 90 tablet 3   • HYDROcodone-acetaminophen (Norco) 5-325 MG per tablet Take 1 tablet by mouth Every 6 (Six) Hours As Needed for Moderate Pain . 60 tablet 0   • ibuprofen (ADVIL,MOTRIN) 800 MG tablet Take 1 tablet by mouth Every 8 (Eight) Hours As Needed for Mild Pain . 30 tablet 0   • levothyroxine (SYNTHROID, LEVOTHROID) 150 MCG tablet Take 1 tablet by mouth Daily. 90 tablet 3   • losartan (COZAAR) 100 MG tablet Take 1 tablet by mouth Daily. 90 tablet 1   • MULTIPLE VITAMINS-MINERALS ER PO Take 1 tablet by mouth Daily.     • Omega-3 Fatty Acids (FISH OIL) 1000 MG capsule capsule Take 1,000 mg by mouth Daily With Breakfast.     • omeprazole (priLOSEC) 40 MG capsule Take 1 capsule by mouth Daily. 90 capsule 0   • prednisoLONE acetate (PRED FORTE) 1 % ophthalmic suspension Administer 1 drop into the left eye Daily.        No current facility-administered medications on file prior to encounter.       Past Medical History:   Diagnosis Date   • Arthritis    • GERD (gastroesophageal reflux disease)    • Hypertension        No hematologic infectious or constitutional symptoms  Negative screen for CEE      Exam:  There were no vitals taken for this visit.  Airway Mallampatti 2  Alert and oriented  Normal deep tendon reflexes in the bilateral patella  5 out of 5 strength bilateral lower extremities  Positive straight leg raise on the right, negative on the left.  Lumbar spine normal in appearance  Lumbar spine without tenderness      Diagnosis:  Post-Op Diagnosis Codes:     * Lumbar radiculopathy [M54.16]    Plan:  Lumbar 5 epidural steroid injection under fluoroscopic guidance    I have encouraged them to continue:  1.  Physical therapy exercises at home as prescribed by physical therapy or from the pain clinic handout.  Continuation of these exercises every day, or multiple times per week, even when the patient has good pain relief, was stressed to the patient as a preventative measure to decrease the frequency and severity of future pain episodes.  2.  Continue pain medicines as already prescribed.  If patient not currently taking any, it is recommended to begin Acetaminophen 1000 mg po q 8 hours.  If other medicines containing Acetaminophen are currently prescribed, maintain daily dose at 3000mg.    3.  If they can tolerate NSAIDS, it is recommended to take Ibuprofen 600 mg po q 6 hours for 7 days during pain exacerbations.   Alternatively, they may substitute an NSAID of their choice (e.g. Aleve)  4.  Heat and ice to the affected area as tolerated for pain control.   5.  Low impact exercise such as walking or water exercise was recommended to maintain overall health and aid in weight control.   6.  Follow up as needed for subsequent injections.

## 2022-12-08 DIAGNOSIS — J30.9 ALLERGIC RHINITIS, UNSPECIFIED SEASONALITY, UNSPECIFIED TRIGGER: ICD-10-CM

## 2022-12-08 RX ORDER — FLUTICASONE PROPIONATE 50 MCG
2 SPRAY, SUSPENSION (ML) NASAL DAILY
Qty: 18 ML | Refills: 5 | Status: SHIPPED | OUTPATIENT
Start: 2022-12-08 | End: 2023-01-19 | Stop reason: SDUPTHER

## 2023-01-19 DIAGNOSIS — E03.9 HYPOTHYROIDISM, UNSPECIFIED TYPE: ICD-10-CM

## 2023-01-19 DIAGNOSIS — K21.9 GASTROESOPHAGEAL REFLUX DISEASE WITHOUT ESOPHAGITIS: ICD-10-CM

## 2023-01-19 DIAGNOSIS — J30.9 ALLERGIC RHINITIS, UNSPECIFIED SEASONALITY, UNSPECIFIED TRIGGER: ICD-10-CM

## 2023-01-19 RX ORDER — OMEPRAZOLE 40 MG/1
40 CAPSULE, DELAYED RELEASE ORAL DAILY
Qty: 90 CAPSULE | Refills: 0 | Status: SHIPPED | OUTPATIENT
Start: 2023-01-19

## 2023-01-19 RX ORDER — FLUTICASONE PROPIONATE 50 MCG
2 SPRAY, SUSPENSION (ML) NASAL DAILY
Qty: 18 ML | Refills: 5 | Status: SHIPPED | OUTPATIENT
Start: 2023-01-19 | End: 2023-03-26 | Stop reason: SDUPTHER

## 2023-01-20 DIAGNOSIS — E03.9 HYPOTHYROIDISM, UNSPECIFIED TYPE: ICD-10-CM

## 2023-01-20 RX ORDER — LEVOTHYROXINE SODIUM 0.15 MG/1
TABLET ORAL
Qty: 90 TABLET | Refills: 0 | OUTPATIENT
Start: 2023-01-20

## 2023-01-22 RX ORDER — LEVOTHYROXINE SODIUM 0.15 MG/1
150 TABLET ORAL DAILY
Qty: 90 TABLET | Refills: 3 | Status: SHIPPED | OUTPATIENT
Start: 2023-01-22

## 2023-01-25 DIAGNOSIS — E03.9 HYPOTHYROIDISM, UNSPECIFIED TYPE: ICD-10-CM

## 2023-01-25 RX ORDER — LEVOTHYROXINE SODIUM 0.15 MG/1
150 TABLET ORAL DAILY
Qty: 90 TABLET | Refills: 1 | Status: SHIPPED | OUTPATIENT
Start: 2023-01-25

## 2023-01-25 RX ORDER — LEVOTHYROXINE SODIUM 0.15 MG/1
150 TABLET ORAL DAILY
Qty: 90 TABLET | Refills: 0 | Status: SHIPPED | OUTPATIENT
Start: 2023-01-25 | End: 2023-01-25 | Stop reason: SDUPTHER

## 2023-02-03 DIAGNOSIS — I10 ESSENTIAL HYPERTENSION: ICD-10-CM

## 2023-02-03 RX ORDER — AMLODIPINE BESYLATE 5 MG/1
5 TABLET ORAL DAILY
Qty: 90 TABLET | Refills: 0 | Status: SHIPPED | OUTPATIENT
Start: 2023-02-03

## 2023-02-03 RX ORDER — HYDROCHLOROTHIAZIDE 25 MG/1
25 TABLET ORAL DAILY
Qty: 90 TABLET | Refills: 0 | Status: SHIPPED | OUTPATIENT
Start: 2023-02-03

## 2023-02-28 ENCOUNTER — ANESTHESIA (OUTPATIENT)
Dept: PAIN MEDICINE | Facility: HOSPITAL | Age: 68
End: 2023-02-28
Payer: MEDICARE

## 2023-02-28 ENCOUNTER — ANESTHESIA EVENT (OUTPATIENT)
Dept: PAIN MEDICINE | Facility: HOSPITAL | Age: 68
End: 2023-02-28
Payer: MEDICARE

## 2023-02-28 ENCOUNTER — HOSPITAL ENCOUNTER (OUTPATIENT)
Dept: GENERAL RADIOLOGY | Facility: HOSPITAL | Age: 68
Discharge: HOME | End: 2023-02-28
Payer: MEDICARE

## 2023-02-28 ENCOUNTER — HOSPITAL ENCOUNTER (OUTPATIENT)
Dept: PAIN MEDICINE | Facility: HOSPITAL | Age: 68
Discharge: HOME | End: 2023-02-28
Payer: MEDICARE

## 2023-02-28 VITALS
SYSTOLIC BLOOD PRESSURE: 125 MMHG | HEART RATE: 82 BPM | TEMPERATURE: 97.8 F | RESPIRATION RATE: 16 BRPM | OXYGEN SATURATION: 95 % | DIASTOLIC BLOOD PRESSURE: 98 MMHG

## 2023-02-28 DIAGNOSIS — R52 PAIN: ICD-10-CM

## 2023-02-28 DIAGNOSIS — M54.16 LUMBAR RADICULOPATHY: ICD-10-CM

## 2023-02-28 PROCEDURE — 25510000001 IOPAMIDOL 41 % SOLUTION

## 2023-02-28 PROCEDURE — 77003 FLUOROGUIDE FOR SPINE INJECT: CPT

## 2023-02-28 PROCEDURE — 25010000002 METHYLPREDNISOLONE PER 80 MG

## 2023-02-28 RX ORDER — SODIUM CHLORIDE 0.9 % (FLUSH) 0.9 %
3 SYRINGE (ML) INJECTION EVERY 12 HOURS SCHEDULED
Status: DISCONTINUED | OUTPATIENT
Start: 2023-02-28 | End: 2023-03-02 | Stop reason: HOSPADM

## 2023-02-28 RX ORDER — MIDAZOLAM HYDROCHLORIDE 1 MG/ML
1 INJECTION INTRAMUSCULAR; INTRAVENOUS ONCE AS NEEDED
Status: DISCONTINUED | OUTPATIENT
Start: 2023-02-28 | End: 2023-03-02 | Stop reason: HOSPADM

## 2023-02-28 RX ORDER — METHYLPREDNISOLONE ACETATE 80 MG/ML
INJECTION, SUSPENSION INTRA-ARTICULAR; INTRALESIONAL; INTRAMUSCULAR; SOFT TISSUE
Status: COMPLETED
Start: 2023-02-28 | End: 2023-02-28

## 2023-02-28 RX ORDER — METHYLPREDNISOLONE ACETATE 80 MG/ML
80 INJECTION, SUSPENSION INTRA-ARTICULAR; INTRALESIONAL; INTRAMUSCULAR; SOFT TISSUE ONCE
Status: COMPLETED | OUTPATIENT
Start: 2023-02-28 | End: 2023-02-28

## 2023-02-28 RX ORDER — FENTANYL CITRATE 50 UG/ML
50 INJECTION, SOLUTION INTRAMUSCULAR; INTRAVENOUS ONCE
Status: DISCONTINUED | OUTPATIENT
Start: 2023-02-28 | End: 2023-03-02 | Stop reason: HOSPADM

## 2023-02-28 RX ORDER — SODIUM CHLORIDE 0.9 % (FLUSH) 0.9 %
10 SYRINGE (ML) INJECTION AS NEEDED
Status: DISCONTINUED | OUTPATIENT
Start: 2023-02-28 | End: 2023-03-02 | Stop reason: HOSPADM

## 2023-02-28 RX ORDER — LIDOCAINE HYDROCHLORIDE 10 MG/ML
1 INJECTION, SOLUTION INFILTRATION; PERINEURAL ONCE
Status: DISCONTINUED | OUTPATIENT
Start: 2023-02-28 | End: 2023-03-02 | Stop reason: HOSPADM

## 2023-02-28 RX ADMIN — METHYLPREDNISOLONE ACETATE 80 MG: 80 INJECTION, SUSPENSION INTRA-ARTICULAR; INTRALESIONAL; INTRAMUSCULAR; SOFT TISSUE at 10:53

## 2023-02-28 RX ADMIN — IOPAMIDOL 10 ML: 408 INJECTION, SOLUTION INTRATHECAL at 10:53

## 2023-02-28 NOTE — ANESTHESIA PROCEDURE NOTES
PAIN Epidural block    Pre-sedation assessment completed: 2/28/2023 10:51 AM    Patient reassessed immediately prior to procedure    Patient location during procedure: pain clinic  Start Time: 2/28/2023 10:51 AM  Stop Time: 2/28/2023 10:57 AM  Indication:at surgeon's request and procedure for pain  Performed By  Anesthesiologist: Andrei Jewell MD  Preanesthetic Checklist  Completed: patient identified, site marked, risks and benefits discussed, surgical consent, monitors and equipment checked, pre-op evaluation and timeout performed  Additional Notes  Post-Op Diagnosis Codes:     * Lumbar radiculopathy (M54.16)    Prep:  Pt Position:prone  Sterile Tech:sterile barrier, mask and gloves  Prep:chlorhexidine gluconate and isopropyl alcohol  Monitoring:blood pressure monitoring, continuous pulse oximetry and EKG  Procedure:Sedation: no     Approach:right paramedian  Guidance: fluoroscopy  Location:lumbar  Level:L5-S1  Needle Type:Tuohy  Needle Gauge:20 G  Aspiration:negative  Test Dose:negative  Medications:  Isovue:2mL  Depomedrol:80mg  Post Assessment:  Pt Tolerance:patient tolerated the procedure well with no apparent complications  Complications:no

## 2023-03-26 DIAGNOSIS — J30.9 ALLERGIC RHINITIS, UNSPECIFIED SEASONALITY, UNSPECIFIED TRIGGER: ICD-10-CM

## 2023-03-27 RX ORDER — FLUTICASONE PROPIONATE 50 MCG
2 SPRAY, SUSPENSION (ML) NASAL DAILY
Qty: 18 ML | Refills: 0 | Status: SHIPPED | OUTPATIENT
Start: 2023-03-27

## 2023-03-27 RX ORDER — PREDNISOLONE ACETATE 10 MG/ML
1 SUSPENSION/ DROPS OPHTHALMIC DAILY
Qty: 10 ML | Refills: 1 | Status: SHIPPED | OUTPATIENT
Start: 2023-03-27

## 2023-04-23 DIAGNOSIS — I10 ESSENTIAL HYPERTENSION: ICD-10-CM

## 2023-04-24 RX ORDER — LOSARTAN POTASSIUM 100 MG/1
100 TABLET ORAL DAILY
Qty: 90 TABLET | Refills: 1 | Status: SHIPPED | OUTPATIENT
Start: 2023-04-24

## 2023-05-15 RX ORDER — AMLODIPINE BESYLATE 5 MG/1
5 TABLET ORAL DAILY
Qty: 90 TABLET | Refills: 0 | OUTPATIENT
Start: 2023-05-15

## 2023-05-31 ENCOUNTER — HOSPITAL ENCOUNTER (OUTPATIENT)
Dept: PAIN MEDICINE | Facility: HOSPITAL | Age: 68
Discharge: HOME | End: 2023-05-31

## 2023-05-31 ENCOUNTER — ANESTHESIA EVENT (OUTPATIENT)
Dept: PAIN MEDICINE | Facility: HOSPITAL | Age: 68
End: 2023-05-31

## 2023-05-31 ENCOUNTER — HOSPITAL ENCOUNTER (OUTPATIENT)
Dept: GENERAL RADIOLOGY | Facility: HOSPITAL | Age: 68
Discharge: HOME | End: 2023-05-31

## 2023-05-31 ENCOUNTER — ANESTHESIA (OUTPATIENT)
Dept: PAIN MEDICINE | Facility: HOSPITAL | Age: 68
End: 2023-05-31

## 2023-05-31 VITALS
HEART RATE: 80 BPM | TEMPERATURE: 98.2 F | RESPIRATION RATE: 14 BRPM | OXYGEN SATURATION: 97 % | SYSTOLIC BLOOD PRESSURE: 122 MMHG | DIASTOLIC BLOOD PRESSURE: 89 MMHG

## 2023-05-31 DIAGNOSIS — M54.16 LUMBAR RADICULOPATHY: ICD-10-CM

## 2023-05-31 DIAGNOSIS — R52 PAIN: ICD-10-CM

## 2023-05-31 PROCEDURE — 25010000002 METHYLPREDNISOLONE PER 80 MG: Performed by: ANESTHESIOLOGY

## 2023-05-31 PROCEDURE — 77003 FLUOROGUIDE FOR SPINE INJECT: CPT

## 2023-05-31 PROCEDURE — 25510000001 IOPAMIDOL 41 % SOLUTION: Performed by: ANESTHESIOLOGY

## 2023-05-31 RX ORDER — LIDOCAINE HYDROCHLORIDE 10 MG/ML
1 INJECTION, SOLUTION INFILTRATION; PERINEURAL ONCE
Status: DISCONTINUED | OUTPATIENT
Start: 2023-05-31 | End: 2023-06-01 | Stop reason: HOSPADM

## 2023-05-31 RX ORDER — MIDAZOLAM HYDROCHLORIDE 1 MG/ML
1 INJECTION INTRAMUSCULAR; INTRAVENOUS
Status: DISCONTINUED | OUTPATIENT
Start: 2023-05-31 | End: 2023-06-01 | Stop reason: HOSPADM

## 2023-05-31 RX ORDER — FENTANYL CITRATE 50 UG/ML
50 INJECTION, SOLUTION INTRAMUSCULAR; INTRAVENOUS AS NEEDED
Status: DISCONTINUED | OUTPATIENT
Start: 2023-05-31 | End: 2023-06-01 | Stop reason: HOSPADM

## 2023-05-31 RX ORDER — METHYLPREDNISOLONE ACETATE 80 MG/ML
80 INJECTION, SUSPENSION INTRA-ARTICULAR; INTRALESIONAL; INTRAMUSCULAR; SOFT TISSUE ONCE
Status: COMPLETED | OUTPATIENT
Start: 2023-05-31 | End: 2023-05-31

## 2023-05-31 RX ADMIN — IOPAMIDOL 10 ML: 408 INJECTION, SOLUTION INTRATHECAL at 09:31

## 2023-05-31 RX ADMIN — METHYLPREDNISOLONE ACETATE 80 MG: 80 INJECTION, SUSPENSION INTRA-ARTICULAR; INTRALESIONAL; INTRAMUSCULAR; SOFT TISSUE at 09:31

## 2023-05-31 NOTE — H&P
INTERVAL HISTORY:    The patient returns for another Lumbar epidural steroid injection 3 today.  They have received 50 % improvement since their last injection with a pain level of 2-8/10 at its worst recently.    Conservative measures tried in the interim. Daily activities are still affected by the pain.    Radiology reports and/or previous notes have been reviewed and are consistent with their diagnosis of Post-Op Diagnosis Codes:     * Lumbar radiculopathy [M54.16]    Alert and oriented  MP - 2  Lungs - clear  CV - rrr    Diagnosis:  Post-Op Diagnosis Codes:     * Lumbar radiculopathy [M54.16]      Plan:  Lumbar epidural steroid injection under fluoroscopic guidance        Target : L5S1    I have encouraged them to continue:  1.  Physical therapy exercises at home as prescribed by physical therapy or from the pain clinic handout (given to the patient).  Continuation of these exercises every day, or multiple times per week, even when the patient has good pain relief, was stressed to the patient as a preventative measure to decrease the frequency and severity of future pain episodes.  2.  Continue pain medicines as already prescribed.  If patient not currently taking any, it is recommended to begin Acetaminophen 1000 mg po q 8 hours.  If other medicines containing Acetaminophen are currently prescribed, maintain daily dose at 3000mg.    3.  If they can tolerate NSAIDS, it is recommended to take Ibuprofen 600 mg po q 6 hours for 7 days during pain exacerbations.   Alternatively, they may substitute an NSAID of their choice (e.g. Aleve)  4.  Heat and ice to the affected area as tolerated for pain control.  It was discussed that heating pads can cause burns.  5.  Low impact exercise such as walking or water exercise was recommended to maintain overall health and aid in weight control.   6.  Follow up as needed for subsequent injections.  7.  Patient was counseled to abstain from tobacco products.    Time :  24  min

## 2023-05-31 NOTE — ANESTHESIA PROCEDURE NOTES
PAIN Epidural block    Pre-sedation assessment completed: 5/31/2023 9:27 AM    Patient reassessed immediately prior to procedure    Patient location during procedure: pain clinic  Start Time: 5/31/2023 9:27 AM  Stop Time: 5/31/2023 9:36 AM  Indication:at surgeon's request and procedure for pain  Performed By  Anesthesiologist: Shahram Berger MD  Preanesthetic Checklist  Completed: patient identified, IV checked, site marked, risks and benefits discussed, surgical consent, monitors and equipment checked, pre-op evaluation and timeout performed  Additional Notes  Dx:  Post-Op Diagnosis Codes:     * Lumbar radiculopathy (M54.16)  80 mg depomedrol in epid    Plan : return to clinic as needed  Prep:  Pt Position:prone (prone)  Sterile Tech:cap, gloves, mask and sterile barrier  Prep:chlorhexidine gluconate and isopropyl alcohol  Monitoring:blood pressure monitoring, EKG and continuous pulse oximetry  Procedure:Sedation: no     Approach:midline  Guidance: fluoroscopy and c arm pa and lat and loss of resistance  Location:lumbar  Level:L5-S1 (interlaminar)  Needle Type:Tigerstriperadha  Needle Gauge:20  Aspiration:negative  Medications:  Preservative Free Saline:3mL  Isovue:2mL  Depomedrol:80 mg  Post Assessment:  Pt Tolerance:patient tolerated the procedure well with no apparent complications  Complications:no

## 2023-05-31 NOTE — DISCHARGE INSTRUCTIONS

## 2023-06-01 DIAGNOSIS — J30.9 ALLERGIC RHINITIS, UNSPECIFIED SEASONALITY, UNSPECIFIED TRIGGER: ICD-10-CM

## 2023-06-01 RX ORDER — FLUTICASONE PROPIONATE 50 MCG
2 SPRAY, SUSPENSION (ML) NASAL DAILY
Qty: 18 ML | Refills: 0 | OUTPATIENT
Start: 2023-06-01

## 2023-07-27 ENCOUNTER — OFFICE VISIT (OUTPATIENT)
Dept: INTERNAL MEDICINE | Facility: CLINIC | Age: 68
End: 2023-07-27
Payer: MEDICARE

## 2023-07-27 VITALS
RESPIRATION RATE: 16 BRPM | HEIGHT: 70 IN | WEIGHT: 200.2 LBS | BODY MASS INDEX: 28.66 KG/M2 | DIASTOLIC BLOOD PRESSURE: 80 MMHG | HEART RATE: 87 BPM | OXYGEN SATURATION: 97 % | SYSTOLIC BLOOD PRESSURE: 116 MMHG

## 2023-07-27 DIAGNOSIS — Z00.00 MEDICARE ANNUAL WELLNESS VISIT, SUBSEQUENT: Primary | ICD-10-CM

## 2023-07-27 DIAGNOSIS — Z00.00 HEALTHCARE MAINTENANCE: ICD-10-CM

## 2023-07-27 DIAGNOSIS — R73.01 ELEVATED FASTING GLUCOSE: ICD-10-CM

## 2023-07-27 DIAGNOSIS — E03.9 HYPOTHYROIDISM, UNSPECIFIED TYPE: ICD-10-CM

## 2023-07-27 DIAGNOSIS — Z13.220 SCREENING FOR LIPID DISORDERS: ICD-10-CM

## 2023-07-27 DIAGNOSIS — J30.9 ALLERGIC RHINITIS, UNSPECIFIED SEASONALITY, UNSPECIFIED TRIGGER: ICD-10-CM

## 2023-07-27 DIAGNOSIS — Z13.1 SCREENING FOR DIABETES MELLITUS: ICD-10-CM

## 2023-07-27 DIAGNOSIS — Z12.5 SCREENING FOR PROSTATE CANCER: ICD-10-CM

## 2023-07-27 RX ORDER — LEVOTHYROXINE SODIUM 0.1 MG/1
100 TABLET ORAL DAILY
COMMUNITY
End: 2023-07-27

## 2023-07-27 RX ORDER — FLUTICASONE PROPIONATE 50 MCG
2 SPRAY, SUSPENSION (ML) NASAL DAILY
Qty: 18 ML | Refills: 6 | Status: SHIPPED | OUTPATIENT
Start: 2023-07-27

## 2023-07-27 RX ORDER — LEVOTHYROXINE SODIUM 0.1 MG/1
100 TABLET ORAL DAILY
Qty: 90 TABLET | Refills: 1 | Status: SHIPPED | OUTPATIENT
Start: 2023-07-27

## 2023-07-27 NOTE — PROGRESS NOTES
The ABCs of the Annual Wellness Visit  Subsequent Medicare Wellness Visit    Subjective      Yohan Lawler is a 67 y.o. male who presents for a Subsequent Medicare Wellness Visit.    The following portions of the patient's history were reviewed and   updated as appropriate: allergies, current medications, past family history, past medical history, past social history, past surgical history, and problem list.    Compared to one year ago, the patient feels his physical   health is better.    Compared to one year ago, the patient feels his mental   health is worse.    Recent Hospitalizations:  He was not admitted to the hospital during the last year.       Current Medical Providers:  Patient Care Team:  Rusty Hickey MD as PCP - General (Family Medicine)    Outpatient Medications Prior to Visit   Medication Sig Dispense Refill    amLODIPine (NORVASC) 5 MG tablet Take 1 tablet by mouth Daily. 10 tablet 0    Apoaequorin (PREVAGEN PO) Take 1 tablet by mouth Daily.      aspirin 81 MG tablet Take 1 tablet by mouth Daily.      hydroCHLOROthiazide (HYDRODIURIL) 25 MG tablet Take 1 tablet by mouth Daily. 10 tablet 0    ibuprofen (ADVIL,MOTRIN) 800 MG tablet Take 1 tablet by mouth Every 8 (Eight) Hours As Needed for Mild Pain . 30 tablet 0    losartan (COZAAR) 100 MG tablet Take 1 tablet by mouth Daily. 10 tablet 0    MULTIPLE VITAMINS-MINERALS ER PO Take 1 tablet by mouth Daily.      Omega-3 Fatty Acids (FISH OIL) 1000 MG capsule capsule Take 1 capsule by mouth Daily With Breakfast.      omeprazole (priLOSEC) 40 MG capsule Take 1 capsule by mouth once daily 30 capsule 0    prednisoLONE acetate (PRED FORTE) 1 % ophthalmic suspension Administer 1 drop into the left eye Daily. 10 mL 1    fluticasone (Flonase) 50 MCG/ACT nasal spray 2 sprays into the nostril(s) as directed by provider Daily. 18 mL 0    levothyroxine (SYNTHROID, LEVOTHROID) 100 MCG tablet Take 1 tablet by mouth Daily.      cyclobenzaprine (FLEXERIL) 10 MG  "tablet Take 1 tablet by mouth three times daily as needed for muscle spasm (Patient not taking: Reported on 7/27/2023) 90 tablet 0    diphenhydrAMINE-APAP, sleep,  MG/30ML liquid Take  by mouth.      levothyroxine (SYNTHROID, LEVOTHROID) 150 MCG tablet Take 1 tablet by mouth Daily. 90 tablet 1    levothyroxine (SYNTHROID, LEVOTHROID) 150 MCG tablet Take 1 tablet by mouth Daily. 30 tablet 0     No facility-administered medications prior to visit.       No opioid medication identified on active medication list. I have reviewed chart for other potential  high risk medication/s and harmful drug interactions in the elderly.        Aspirin is on active medication list. Aspirin use is indicated based on review of current medical condition/s. Pros and cons of this therapy have been discussed today. Benefits of this medication outweigh potential harm.  Patient has been encouraged to continue taking this medication.  .      Patient Active Problem List   Diagnosis    Essential hypertension    Hypothyroidism    GERD (gastroesophageal reflux disease)    Blood glucose elevated    Bursitis    Carpal tunnel syndrome    Tingling    Benign essential HTN    Personal history of colonic polyps    Polyp of colon    Hearing loss    Esophageal reflux     Advance Care Planning   Advance Care Planning     Advance Directive is not on file.  ACP discussion was held with the patient during this visit. Patient does not have an advance directive, declines further assistance.     Objective    Vitals:    07/27/23 0910   BP: 116/80   BP Location: Left arm   Patient Position: Sitting   Cuff Size: Adult   Pulse: 87   Resp: 16   SpO2: 97%   Weight: 90.8 kg (200 lb 3.2 oz)   Height: 177.8 cm (70\")     Estimated body mass index is 28.73 kg/m² as calculated from the following:    Height as of this encounter: 177.8 cm (70\").    Weight as of this encounter: 90.8 kg (200 lb 3.2 oz).    BMI is >= 25 and <30. (Overweight) The following options were " offered after discussion;: exercise counseling/recommendations and nutrition counseling/recommendations      Does the patient have evidence of cognitive impairment?   No            HEALTH RISK ASSESSMENT    Smoking Status:  Social History     Tobacco Use   Smoking Status Never   Smokeless Tobacco Never     Alcohol Consumption:  Social History     Substance and Sexual Activity   Alcohol Use Yes    Alcohol/week: 12.0 standard drinks    Types: 12 Cans of beer per week     Fall Risk Screen:    CHANNING Fall Risk Assessment was completed, and patient is at LOW risk for falls.Assessment completed on:2023    Depression Screenin/27/2023     9:00 AM   PHQ-2/PHQ-9 Depression Screening   Little Interest or Pleasure in Doing Things 0-->not at all   Feeling Down, Depressed or Hopeless 0-->not at all   PHQ-9: Brief Depression Severity Measure Score 0       Health Habits and Functional and Cognitive Screenin/27/2023     9:00 AM   Functional & Cognitive Status   Do you have difficulty preparing food and eating? No   Do you have difficulty bathing yourself, getting dressed or grooming yourself? No   Do you have difficulty using the toilet? No   Do you have difficulty moving around from place to place? No   Do you have trouble with steps or getting out of a bed or a chair? No   Current Diet Well Balanced Diet   Dental Exam Up to date   Eye Exam Up to date   Exercise (times per week) 5 times per week   Current Exercises Include Walking   Do you need help using the phone?  No   Are you deaf or do you have serious difficulty hearing?  No   Do you need help to go to places out of walking distance? No   Do you need help shopping? No   Do you need help preparing meals?  No   Do you need help with housework?  No   Do you need help with laundry? No   Do you need help taking your medications? No   Do you need help managing money? No   Do you ever drive or ride in a car without wearing a seat belt? No   Have you felt  unusual stress, anger or loneliness in the last month? No   Who do you live with? Spouse   If you need help, do you have trouble finding someone available to you? No   Have you been bothered in the last four weeks by sexual problems? No   Do you have difficulty concentrating, remembering or making decisions? No       Age-appropriate Screening Schedule:  Refer to the list below for future screening recommendations based on patient's age, sex and/or medical conditions. Orders for these recommended tests are listed in the plan section. The patient has been provided with a written plan.    Health Maintenance   Topic Date Due    ZOSTER VACCINE (1 of 2) Never done    ANNUAL WELLNESS VISIT  03/15/2023    COVID-19 Vaccine (5 - Pfizer series) 04/03/2023    INFLUENZA VACCINE  10/01/2023    COLORECTAL CANCER SCREENING  02/26/2025    TDAP/TD VACCINES (3 - Td or Tdap) 08/05/2026    HEPATITIS C SCREENING  Completed    Pneumococcal Vaccine 65+  Completed                  CMS Preventative Services Quick Reference  Risk Factors Identified During Encounter:    None Identified    The above risks/problems have been discussed with the patient.  Pertinent information has been shared with the patient in the After Visit Summary.    Diagnoses and all orders for this visit:    1. Medicare annual wellness visit, subsequent (Primary)  -     CBC & Differential  -     Comprehensive Metabolic Panel    2. Healthcare maintenance  -     Cancel: CBC & Differential  -     Cancel: Comprehensive Metabolic Panel  -     Cancel: Hemoglobin A1c  -     Cancel: Thyroid Panel With TSH  -     Cancel: Lipid Panel With LDL / HDL Ratio    3. Hypothyroidism, unspecified type  -     Cancel: Thyroid Panel With TSH  -     levothyroxine (Synthroid) 100 MCG tablet; Take 1 tablet by mouth Daily.  Dispense: 90 tablet; Refill: 1  -     Thyroid Panel With TSH    4. Allergic rhinitis, unspecified seasonality, unspecified trigger  -     fluticasone (Flonase) 50 MCG/ACT nasal  spray; 2 sprays into the nostril(s) as directed by provider Daily.  Dispense: 18 mL; Refill: 6    5. Screening for prostate cancer  -     Cancel: PSA Screen  -     PSA Screen    6. Screening for diabetes mellitus    7. Screening for lipid disorders  -     Lipid Panel With LDL / HDL Ratio    8. Elevated fasting glucose  -     Hemoglobin A1c        Follow Up:   Next Medicare Wellness visit to be scheduled in 1 year.      An After Visit Summary and PPPS were made available to the patient.

## 2023-07-27 NOTE — PROGRESS NOTES
"Chief Complaint  Medicare Wellness-subsequent    Subjective        Yohan Lawler presents to [facility]  History of Present Illness    Patient is history of hypothyroidism.  He is currently on levothyroxine 100 mcg daily.  He denies any side effects of the medication.  Patient was post be taking 150 mcg of levothyroxine.  It is unclear exactly why he is taking the 100 mcg of levothyroxine.  I did discuss with him at today's visit that I like to check his thyroid levels, and determine what dose he needs to be after checking this.    Patient allergic rhinitis.  Patient states that in the past Flonase seems to help him.  Patient states that he would like to try the Flonase again.    Objective   Vital Signs:  /80 (BP Location: Left arm, Patient Position: Sitting, Cuff Size: Adult)   Pulse 87   Resp 16   Ht 177.8 cm (70\")   Wt 90.8 kg (200 lb 3.2 oz)   SpO2 97%   BMI 28.73 kg/m²   Estimated body mass index is 28.73 kg/m² as calculated from the following:    Height as of this encounter: 177.8 cm (70\").    Weight as of this encounter: 90.8 kg (200 lb 3.2 oz).             Physical Exam  Vitals and nursing note reviewed.   Constitutional:       Appearance: He is well-developed.   HENT:      Head: Normocephalic and atraumatic.   Musculoskeletal:      Cervical back: Normal range of motion and neck supple.   Neurological:      Mental Status: He is alert and oriented to person, place, and time.   Psychiatric:         Behavior: Behavior normal.      Result Review :                   Assessment and Plan   Diagnoses and all orders for this visit:     Medicare annual wellness visit, subsequent (Primary)  -     CBC & Differential  -     Comprehensive Metabolic Panel    Healthcare maintenance  -     Cancel: CBC & Differential  -     Cancel: Comprehensive Metabolic Panel  -     Cancel: Hemoglobin A1c  -     Cancel: Thyroid Panel With TSH  -     Cancel: Lipid Panel With LDL / HDL " Ratio    Hypothyroidism, unspecified type  -     Cancel: Thyroid Panel With TSH  -     levothyroxine (Synthroid) 100 MCG tablet; Take 1 tablet by mouth Daily.  Dispense: 90 tablet; Refill: 1  -     Thyroid Panel With TSH  -     Thyroid Panel With TSH    Allergic rhinitis, unspecified seasonality, unspecified trigger  -     fluticasone (Flonase) 50 MCG/ACT nasal spray; 2 sprays into the nostril(s) as directed by provider Daily.  Dispense: 18 mL; Refill: 6  -     CBC & Differential  -     Comprehensive Metabolic Panel     Elevated fasting glucose  -     Hemoglobin A1c  -     Hemoglobin A1c      Allergic rhinitis, will start him on Flonase.  He can still take over-the-counter antihistamine.  For his hypothyroidism, we will do levothyroxine 100 mcg daily for now.  We will check his thyroid panel.  If it needs to be adjusted we will make adjustments based off of panel.       Follow Up   No follow-ups on file.  Patient was given instructions and counseling regarding his condition or for health maintenance advice. Please see specific information pulled into the AVS if appropriate.

## 2023-07-28 LAB
ALBUMIN SERPL-MCNC: 4.6 G/DL (ref 3.5–5.2)
ALBUMIN/GLOB SERPL: 2.2 G/DL
ALP SERPL-CCNC: 73 U/L (ref 39–117)
ALT SERPL-CCNC: 13 U/L (ref 1–41)
AST SERPL-CCNC: 14 U/L (ref 1–40)
BASOPHILS # BLD AUTO: 0.08 10*3/MM3 (ref 0–0.2)
BASOPHILS NFR BLD AUTO: 1.3 % (ref 0–1.5)
BILIRUB SERPL-MCNC: 0.7 MG/DL (ref 0–1.2)
BUN SERPL-MCNC: 15 MG/DL (ref 8–23)
BUN/CREAT SERPL: 15.6 (ref 7–25)
CALCIUM SERPL-MCNC: 9.4 MG/DL (ref 8.6–10.5)
CHLORIDE SERPL-SCNC: 101 MMOL/L (ref 98–107)
CHOLEST SERPL-MCNC: 163 MG/DL (ref 0–200)
CO2 SERPL-SCNC: 26.5 MMOL/L (ref 22–29)
CREAT SERPL-MCNC: 0.96 MG/DL (ref 0.76–1.27)
EGFRCR SERPLBLD CKD-EPI 2021: 85.6 ML/MIN/1.73
EOSINOPHIL # BLD AUTO: 0.32 10*3/MM3 (ref 0–0.4)
EOSINOPHIL NFR BLD AUTO: 5.4 % (ref 0.3–6.2)
ERYTHROCYTE [DISTWIDTH] IN BLOOD BY AUTOMATED COUNT: 12.7 % (ref 12.3–15.4)
FT4I SERPL CALC-MCNC: 1.9 (ref 1.2–4.9)
GLOBULIN SER CALC-MCNC: 2.1 GM/DL
GLUCOSE SERPL-MCNC: 115 MG/DL (ref 65–99)
HBA1C MFR BLD: 6.2 % (ref 4.8–5.6)
HCT VFR BLD AUTO: 43.1 % (ref 37.5–51)
HDLC SERPL-MCNC: 32 MG/DL (ref 40–60)
HGB BLD-MCNC: 15 G/DL (ref 13–17.7)
IMM GRANULOCYTES # BLD AUTO: 0.01 10*3/MM3 (ref 0–0.05)
IMM GRANULOCYTES NFR BLD AUTO: 0.2 % (ref 0–0.5)
LDLC SERPL CALC-MCNC: 72 MG/DL (ref 0–100)
LDLC/HDLC SERPL: 1.76 {RATIO}
LYMPHOCYTES # BLD AUTO: 2.41 10*3/MM3 (ref 0.7–3.1)
LYMPHOCYTES NFR BLD AUTO: 40.4 % (ref 19.6–45.3)
MCH RBC QN AUTO: 30.5 PG (ref 26.6–33)
MCHC RBC AUTO-ENTMCNC: 34.8 G/DL (ref 31.5–35.7)
MCV RBC AUTO: 87.8 FL (ref 79–97)
MONOCYTES # BLD AUTO: 0.35 10*3/MM3 (ref 0.1–0.9)
MONOCYTES NFR BLD AUTO: 5.9 % (ref 5–12)
NEUTROPHILS # BLD AUTO: 2.79 10*3/MM3 (ref 1.7–7)
NEUTROPHILS NFR BLD AUTO: 46.8 % (ref 42.7–76)
NRBC BLD AUTO-RTO: 0 /100 WBC (ref 0–0.2)
PLATELET # BLD AUTO: 252 10*3/MM3 (ref 140–450)
POTASSIUM SERPL-SCNC: 4.3 MMOL/L (ref 3.5–5.2)
PROT SERPL-MCNC: 6.7 G/DL (ref 6–8.5)
PSA SERPL-MCNC: 0.28 NG/ML (ref 0–4)
RBC # BLD AUTO: 4.91 10*6/MM3 (ref 4.14–5.8)
SODIUM SERPL-SCNC: 138 MMOL/L (ref 136–145)
T3RU NFR SERPL: 25 % (ref 24–39)
T4 SERPL-MCNC: 7.5 UG/DL (ref 4.5–12)
TRIGL SERPL-MCNC: 373 MG/DL (ref 0–150)
TSH SERPL DL<=0.005 MIU/L-ACNC: 2.59 UIU/ML (ref 0.45–4.5)
VLDLC SERPL CALC-MCNC: 59 MG/DL (ref 5–40)
WBC # BLD AUTO: 5.96 10*3/MM3 (ref 3.4–10.8)

## 2023-07-30 NOTE — PROGRESS NOTES
Please inform the patient of the following abnormal results. Prediabetic and triglycerides are elevated. Needs to diet and exercise more.

## 2023-08-09 DIAGNOSIS — I10 ESSENTIAL HYPERTENSION: ICD-10-CM

## 2023-08-09 NOTE — TELEPHONE ENCOUNTER
Rx Refill Note  Requested Prescriptions     Pending Prescriptions Disp Refills    hydroCHLOROthiazide (HYDRODIURIL) 25 MG tablet 30 tablet 5     Sig: Take 1 tablet by mouth Daily.      Last office visit with prescribing clinician: 7/27/2023   Last telemedicine visit with prescribing clinician: 7/3/2023   Next office visit with prescribing clinician: 10/23/2023       Corey Cruz MA  08/09/23, 16:25 EDT

## 2023-08-10 RX ORDER — HYDROCHLOROTHIAZIDE 25 MG/1
25 TABLET ORAL DAILY
Qty: 30 TABLET | Refills: 5 | Status: SHIPPED | OUTPATIENT
Start: 2023-08-10

## 2023-08-13 ENCOUNTER — PATIENT MESSAGE (OUTPATIENT)
Dept: INTERNAL MEDICINE | Facility: CLINIC | Age: 68
End: 2023-08-13
Payer: MEDICARE

## 2023-08-13 DIAGNOSIS — I10 ESSENTIAL HYPERTENSION: Primary | ICD-10-CM

## 2023-08-14 RX ORDER — AMLODIPINE BESYLATE 5 MG/1
5 TABLET ORAL DAILY
Qty: 10 TABLET | Refills: 0 | Status: SHIPPED | OUTPATIENT
Start: 2023-08-14 | End: 2023-08-14 | Stop reason: SDUPTHER

## 2023-08-14 RX ORDER — AMLODIPINE BESYLATE 5 MG/1
5 TABLET ORAL DAILY
Qty: 90 TABLET | Refills: 3 | Status: SHIPPED | OUTPATIENT
Start: 2023-08-14

## 2023-09-04 DIAGNOSIS — I10 ESSENTIAL HYPERTENSION: ICD-10-CM

## 2023-09-06 ENCOUNTER — HOSPITAL ENCOUNTER (OUTPATIENT)
Dept: GENERAL RADIOLOGY | Facility: HOSPITAL | Age: 68
Discharge: HOME | End: 2023-09-06
Payer: MEDICARE

## 2023-09-06 ENCOUNTER — ANESTHESIA EVENT (OUTPATIENT)
Dept: PAIN MEDICINE | Facility: HOSPITAL | Age: 68
End: 2023-09-06
Payer: MEDICARE

## 2023-09-06 ENCOUNTER — ANESTHESIA (OUTPATIENT)
Dept: PAIN MEDICINE | Facility: HOSPITAL | Age: 68
End: 2023-09-06
Payer: MEDICARE

## 2023-09-06 ENCOUNTER — HOSPITAL ENCOUNTER (OUTPATIENT)
Dept: PAIN MEDICINE | Facility: HOSPITAL | Age: 68
Discharge: HOME | End: 2023-09-06
Payer: MEDICARE

## 2023-09-06 VITALS
BODY MASS INDEX: 26.48 KG/M2 | SYSTOLIC BLOOD PRESSURE: 105 MMHG | WEIGHT: 185 LBS | TEMPERATURE: 98.2 F | HEIGHT: 70 IN | DIASTOLIC BLOOD PRESSURE: 75 MMHG | RESPIRATION RATE: 14 BRPM | HEART RATE: 73 BPM | OXYGEN SATURATION: 94 %

## 2023-09-06 DIAGNOSIS — R52 PAIN: ICD-10-CM

## 2023-09-06 DIAGNOSIS — M54.16 LUMBAR RADICULOPATHY: ICD-10-CM

## 2023-09-06 PROCEDURE — 77003 FLUOROGUIDE FOR SPINE INJECT: CPT

## 2023-09-06 PROCEDURE — 25510000001 IOPAMIDOL 41 % SOLUTION: Performed by: ANESTHESIOLOGY

## 2023-09-06 PROCEDURE — 25010000002 METHYLPREDNISOLONE PER 80 MG: Performed by: ANESTHESIOLOGY

## 2023-09-06 RX ORDER — FENTANYL CITRATE 50 UG/ML
50 INJECTION, SOLUTION INTRAMUSCULAR; INTRAVENOUS ONCE
Status: DISCONTINUED | OUTPATIENT
Start: 2023-09-06 | End: 2023-09-07 | Stop reason: HOSPADM

## 2023-09-06 RX ORDER — METHYLPREDNISOLONE ACETATE 80 MG/ML
80 INJECTION, SUSPENSION INTRA-ARTICULAR; INTRALESIONAL; INTRAMUSCULAR; SOFT TISSUE ONCE
Status: COMPLETED | OUTPATIENT
Start: 2023-09-06 | End: 2023-09-06

## 2023-09-06 RX ORDER — LIDOCAINE HYDROCHLORIDE 10 MG/ML
1 INJECTION, SOLUTION INFILTRATION; PERINEURAL ONCE
Status: DISCONTINUED | OUTPATIENT
Start: 2023-09-06 | End: 2023-09-07 | Stop reason: HOSPADM

## 2023-09-06 RX ORDER — MIDAZOLAM HYDROCHLORIDE 1 MG/ML
1 INJECTION INTRAMUSCULAR; INTRAVENOUS ONCE AS NEEDED
Status: DISCONTINUED | OUTPATIENT
Start: 2023-09-06 | End: 2023-09-07 | Stop reason: HOSPADM

## 2023-09-06 RX ORDER — HYDROCHLOROTHIAZIDE 25 MG/1
25 TABLET ORAL DAILY
Qty: 90 TABLET | Refills: 0 | Status: SHIPPED | OUTPATIENT
Start: 2023-09-06

## 2023-09-06 RX ADMIN — IOPAMIDOL 10 ML: 408 INJECTION, SOLUTION INTRATHECAL at 09:16

## 2023-09-06 RX ADMIN — METHYLPREDNISOLONE ACETATE 80 MG: 80 INJECTION, SUSPENSION INTRA-ARTICULAR; INTRALESIONAL; INTRAMUSCULAR; SOFT TISSUE at 09:16

## 2023-09-06 NOTE — DISCHARGE INSTRUCTIONS

## 2023-09-06 NOTE — ANESTHESIA PROCEDURE NOTES
PAIN Epidural block    Pre-sedation assessment completed: 9/6/2023 9:09 AM    Patient reassessed immediately prior to procedure    Patient location during procedure: pain clinic  Start Time: 9/6/2023 9:09 AM  Stop Time: 9/6/2023 9:21 AM  Indication:procedure for pain  Performed By  Anesthesiologist: Kelly Chou MD  Preanesthetic Checklist  Completed: patient identified, IV checked, site marked, risks and benefits discussed, surgical consent, monitors and equipment checked, pre-op evaluation and timeout performed  Additional Notes  Fluoro used.    Dx: lumbar radiculopathy.    Prep:  Pt Position:prone  Sterile Tech:cap, gloves, mask and sterile barrier  Prep:chlorhexidine gluconate and isopropyl alcohol  Monitoring:blood pressure monitoring, continuous pulse oximetry and EKG  Procedure:Sedation: no     Approach:right paramedian  Guidance: fluoroscopy  Location:lumbar  Level:L5-S1  Needle Type:Tuohy  Needle Gauge:20  Aspiration:negative  Medications:  Preservative Free Saline:3mL  Isovue:1mL  Comments:Dye spread c/w epidurogramDepomedrol:80  Post Assessment:  Dressing:occlusive dressing applied  Pt Tolerance:patient tolerated the procedure well with no apparent complications  Complications:no

## 2023-09-06 NOTE — H&P
[facility]    History and Physical    Patient Name: Yohan Lawler  :  1955  MRN:  [id number]  Date of Admission: 2023    Subjective     Patient is a 68 y.o. male presents with chief complaint of chronic, moderate low back and leg: right pain.  Onset of symptoms was gradual starting a few years ago.  Symptoms are associated/aggravated by nothing in particular or activity. Symptoms improve with injection - more than 50% relief following prior lesi.  Presents for lesi today.      The following portions of the patients history were reviewed and updated as appropriate: current medications, allergies, past medical history, past surgical history, past family history, past social history, and problem list                Objective     Past Medical History:   Past Medical History:   Diagnosis Date    Arthritis     Disease of thyroid gland     GERD (gastroesophageal reflux disease)     Hypertension     Low back pain      Past Surgical History:   Past Surgical History:   Procedure Laterality Date    EYE SURGERY      Detached Retuina     Family History:   Family History   Problem Relation Age of Onset    Arthritis Mother     Hypertension Mother     Thyroid disease Sister     Thyroid disease Sister      Social History:   Social History     Socioeconomic History    Marital status:    Tobacco Use    Smoking status: Never    Smokeless tobacco: Never   Vaping Use    Vaping Use: Never used   Substance and Sexual Activity    Alcohol use: Yes     Alcohol/week: 12.0 standard drinks     Types: 12 Cans of beer per week    Drug use: No    Sexual activity: Not Currently       Vital Signs Range for the last 24 hours  Temperature: Temp:  [36.8 °C (98.2 °F)] 36.8 °C (98.2 °F)   Temp Source: Temp src: Infrared   BP: BP: (117)/(86) 117/86   Pulse: Heart Rate:  [86] 86   Respirations: Resp:  [14] 14   SPO2: SpO2:  [95 %] 95 %   O2 Amount (l/min):     O2 Devices Device (Oxygen Therapy): room air   Weight: Weight:  [83.9  "kg (185 lb)] 83.9 kg (185 lb)     Flowsheet Rows      Flowsheet Row First Filed Value   Admission Height 177.8 cm (70\") Documented at 09/06/2023 0848   Admission Weight 83.9 kg (185 lb) Documented at 09/06/2023 0848            --------------------------------------------------------------------------------    Current Outpatient Medications   Medication Sig Dispense Refill    amLODIPine (NORVASC) 5 MG tablet Take 1 tablet by mouth Daily. 90 tablet 3    Apoaequorin (PREVAGEN PO) Take 1 tablet by mouth Daily.      aspirin 81 MG tablet Take 1 tablet by mouth Daily.      fluticasone (Flonase) 50 MCG/ACT nasal spray 2 sprays into the nostril(s) as directed by provider Daily. 18 mL 6    hydroCHLOROthiazide (HYDRODIURIL) 25 MG tablet Take 1 tablet by mouth Daily. 30 tablet 5    ibuprofen (ADVIL,MOTRIN) 800 MG tablet Take 1 tablet by mouth Every 8 (Eight) Hours As Needed for Mild Pain . 30 tablet 0    levothyroxine (Synthroid) 100 MCG tablet Take 1 tablet by mouth Daily. 90 tablet 1    losartan (COZAAR) 100 MG tablet Take 1 tablet by mouth Daily. 10 tablet 0    MULTIPLE VITAMINS-MINERALS ER PO Take 1 tablet by mouth Daily.      Omega-3 Fatty Acids (FISH OIL) 1000 MG capsule capsule Take 1 capsule by mouth Daily With Breakfast.      omeprazole (priLOSEC) 40 MG capsule Take 1 capsule by mouth once daily 30 capsule 0    cyclobenzaprine (FLEXERIL) 10 MG tablet Take 1 tablet by mouth three times daily as needed for muscle spasm (Patient not taking: Reported on 7/27/2023) 90 tablet 0    diphenhydrAMINE-APAP, sleep,  MG/30ML liquid Take  by mouth.      prednisoLONE acetate (PRED FORTE) 1 % ophthalmic suspension Administer 1 drop into the left eye Daily. 10 mL 1     Current Facility-Administered Medications   Medication Dose Route Frequency Provider Last Rate Last Admin    fentaNYL citrate (PF) (SUBLIMAZE) injection 50 mcg  50 mcg Intravenous Once Kelly Chou MD        iopamidol (ISOVUE-M 200) injection 41%  " 10 mL Epidural Once in imaging Kelly Chou MD        lidocaine (XYLOCAINE) 1 % injection 1 mL  1 mL Intradermal Once Kelly Chou MD        methylPREDNISolone acetate (DEPO-medrol) injection 80 mg  80 mg Epidural Once Kelly Chou MD        midazolam (VERSED) injection 1 mg  1 mg Intravenous Once PRN Kelly Chou MD           --------------------------------------------------------------------------------  Assessment & Plan      Anesthesia Evaluation     Patient summary reviewed and Nursing notes reviewed   no history of anesthetic complications:                Airway   Mallampati: II  Dental      Pulmonary - negative pulmonary ROS   Cardiovascular   Exercise tolerance: good (4-7 METS)    (+) hypertension      Neuro/Psych  (+) numbness  GI/Hepatic/Renal/Endo    (+) GERD, thyroid problem     Musculoskeletal     (+) back pain, chronic pain, radiculopathy Right lower extremity  Abdominal    Substance History   (+) alcohol use     OB/GYN negative ob/gyn ROS         Other   arthritis,                Diagnosis and Plan    Treatment Plan  ASA 2   Patient has had previous injection/procedure with 50-75% improvement.   Procedures: Lumbar Epidural Steroid Injection(LESI), With fluoroscopy,      Anesthetic plan and risks discussed with patient.        Diagnosis     * Lumbar radiculopathy [M54.16]

## 2023-09-25 DIAGNOSIS — E03.9 HYPOTHYROIDISM, UNSPECIFIED TYPE: ICD-10-CM

## 2023-09-25 DIAGNOSIS — J30.9 ALLERGIC RHINITIS, UNSPECIFIED SEASONALITY, UNSPECIFIED TRIGGER: ICD-10-CM

## 2023-09-25 RX ORDER — LEVOTHYROXINE SODIUM 0.1 MG/1
100 TABLET ORAL DAILY
Qty: 90 TABLET | Refills: 1 | Status: SHIPPED | OUTPATIENT
Start: 2023-09-25

## 2023-09-25 NOTE — TELEPHONE ENCOUNTER
Rx Refill Note  Requested Prescriptions     Pending Prescriptions Disp Refills    fluticasone (Flonase) 50 MCG/ACT nasal spray 18 mL 6     Si sprays into the nostril(s) as directed by provider Daily.      Last office visit with prescribing clinician: 2023   Last telemedicine visit with prescribing clinician: 7/3/2023   Next office visit with prescribing clinician: 2023

## 2023-09-26 RX ORDER — FLUTICASONE PROPIONATE 50 MCG
2 SPRAY, SUSPENSION (ML) NASAL DAILY
Qty: 18 ML | Refills: 6 | Status: SHIPPED | OUTPATIENT
Start: 2023-09-26

## 2023-09-26 RX ORDER — PREDNISOLONE ACETATE 10 MG/ML
1 SUSPENSION/ DROPS OPHTHALMIC DAILY
Qty: 10 ML | Refills: 1 | Status: SHIPPED | OUTPATIENT
Start: 2023-09-26

## 2023-10-06 DIAGNOSIS — I10 ESSENTIAL HYPERTENSION: ICD-10-CM

## 2023-10-09 RX ORDER — LOSARTAN POTASSIUM 100 MG/1
100 TABLET ORAL DAILY
Qty: 90 TABLET | Refills: 0 | Status: SHIPPED | OUTPATIENT
Start: 2023-10-09

## 2023-10-22 DIAGNOSIS — K21.9 GASTROESOPHAGEAL REFLUX DISEASE WITHOUT ESOPHAGITIS: ICD-10-CM

## 2023-10-23 ENCOUNTER — OFFICE VISIT (OUTPATIENT)
Dept: INTERNAL MEDICINE | Facility: CLINIC | Age: 68
End: 2023-10-23
Payer: MEDICARE

## 2023-10-23 ENCOUNTER — PATIENT MESSAGE (OUTPATIENT)
Dept: INTERNAL MEDICINE | Facility: CLINIC | Age: 68
End: 2023-10-23

## 2023-10-23 VITALS
WEIGHT: 178 LBS | HEART RATE: 77 BPM | OXYGEN SATURATION: 98 % | SYSTOLIC BLOOD PRESSURE: 124 MMHG | DIASTOLIC BLOOD PRESSURE: 82 MMHG | BODY MASS INDEX: 25.48 KG/M2 | HEIGHT: 70 IN | RESPIRATION RATE: 14 BRPM

## 2023-10-23 DIAGNOSIS — M54.31 SCIATICA OF RIGHT SIDE: Primary | ICD-10-CM

## 2023-10-23 DIAGNOSIS — E03.9 HYPOTHYROIDISM, UNSPECIFIED TYPE: ICD-10-CM

## 2023-10-23 DIAGNOSIS — Z23 NEED FOR INFLUENZA VACCINATION: ICD-10-CM

## 2023-10-23 DIAGNOSIS — R73.03 PREDIABETES: ICD-10-CM

## 2023-10-23 DIAGNOSIS — G89.29 CHRONIC LOW BACK PAIN, UNSPECIFIED BACK PAIN LATERALITY, UNSPECIFIED WHETHER SCIATICA PRESENT: ICD-10-CM

## 2023-10-23 DIAGNOSIS — I10 ESSENTIAL HYPERTENSION: Primary | ICD-10-CM

## 2023-10-23 DIAGNOSIS — M54.50 CHRONIC LOW BACK PAIN, UNSPECIFIED BACK PAIN LATERALITY, UNSPECIFIED WHETHER SCIATICA PRESENT: ICD-10-CM

## 2023-10-23 DIAGNOSIS — E78.5 DYSLIPIDEMIA: ICD-10-CM

## 2023-10-23 PROCEDURE — 90662 IIV NO PRSV INCREASED AG IM: CPT | Performed by: FAMILY MEDICINE

## 2023-10-23 PROCEDURE — 1160F RVW MEDS BY RX/DR IN RCRD: CPT | Performed by: FAMILY MEDICINE

## 2023-10-23 PROCEDURE — 3079F DIAST BP 80-89 MM HG: CPT | Performed by: FAMILY MEDICINE

## 2023-10-23 PROCEDURE — 3074F SYST BP LT 130 MM HG: CPT | Performed by: FAMILY MEDICINE

## 2023-10-23 PROCEDURE — 1159F MED LIST DOCD IN RCRD: CPT | Performed by: FAMILY MEDICINE

## 2023-10-23 PROCEDURE — G0008 ADMIN INFLUENZA VIRUS VAC: HCPCS | Performed by: FAMILY MEDICINE

## 2023-10-23 PROCEDURE — 99214 OFFICE O/P EST MOD 30 MIN: CPT | Performed by: FAMILY MEDICINE

## 2023-10-23 RX ORDER — LEVOTHYROXINE SODIUM 0.1 MG/1
100 TABLET ORAL DAILY
Qty: 90 TABLET | Refills: 3 | Status: SHIPPED | OUTPATIENT
Start: 2023-10-23

## 2023-10-23 RX ORDER — HYDROCHLOROTHIAZIDE 25 MG/1
25 TABLET ORAL DAILY
Qty: 90 TABLET | Refills: 3 | Status: SHIPPED | OUTPATIENT
Start: 2023-10-23

## 2023-10-23 RX ORDER — LOSARTAN POTASSIUM 100 MG/1
100 TABLET ORAL DAILY
Qty: 90 TABLET | Refills: 3 | Status: SHIPPED | OUTPATIENT
Start: 2023-10-23

## 2023-10-23 RX ORDER — OMEPRAZOLE 40 MG/1
40 CAPSULE, DELAYED RELEASE ORAL DAILY
Qty: 90 CAPSULE | Refills: 2 | Status: SHIPPED | OUTPATIENT
Start: 2023-10-23

## 2023-10-23 RX ORDER — AMLODIPINE BESYLATE 5 MG/1
5 TABLET ORAL DAILY
Qty: 90 TABLET | Refills: 3 | Status: SHIPPED | OUTPATIENT
Start: 2023-10-23

## 2023-10-23 NOTE — PROGRESS NOTES
Chief Complaint  Hypertension    Subjective          Yohan Lawler presents to [facility]  History of Present Illness    The patient presents today for a follow-up visit and follow-up on blood pressure.    The patient has lost weight since his last visit. In 07/2023, he was 200 pounds, in 09/2023 he was 185 pounds, and today he is 178 pounds. He is exercising every morning. He feels better and stronger. He stretches in the morning. He had back problems previously and has had 2 vertebrae fractured in the past. He is scheduled for another epidural. As long as he stretches, he is in good shape. He takes care of his wife She has issues and eventually he is going to have to put her in a facility. She has an appointment next week, and he is going to try to get her in a facility of some sort. He is retired. He has a house, his wife, a dog, and he takes care of his neighborhood. He used to have a drink periodically, but he has not had any type of alcohol for 3 years. He tries to get out as much as he can. He meets with his friends once a month and then he saw his family over the weekend. He is doing all this by himself. Hs main focus is staying in shape and trying to stay healthy.    The patient is currently taking levothyroxine 100 mcg daily. He denies any side effects of the medication.    The patient is currently not taking any medicines for dyslipidemia. He has been monitoring with diet and exercise.    The patient's blood pressure has been good with today's blood pressure 124/82 mmHg. He is currently on amlodipine 5 mg daily plus hydrochlorothiazide 25 mg daily plus losartan 100 mg daily. He does not have any side effects of those medications.    His allergies are so bad that his eyes are watering. He has a nasal spray for his allergies. He has Claritin, but he does not take it daily.    The patient would like to get his influenza vaccine today. He inquired about the COVID-19  "vaccine.    Objective   Vital Signs:   /82 (BP Location: Left arm, Patient Position: Sitting, Cuff Size: Adult)   Pulse 77   Resp 14   Ht 177.8 cm (70\")   Wt 80.7 kg (178 lb)   SpO2 98%   BMI 25.54 kg/m²     A review of systems was performed, and the pertinent positives are noted in the HPI.    Physical Exam  Vitals and nursing note reviewed.   Constitutional:       Appearance: He is well-developed.   HENT:      Head: Normocephalic and atraumatic.   Musculoskeletal:      Cervical back: Normal range of motion and neck supple.   Neurological:      Mental Status: He is alert and oriented to person, place, and time.   Psychiatric:         Behavior: Behavior normal.        Result Review :                 Assessment and Plan    Diagnoses and all orders for this visit:    1. Essential hypertension (Primary)  -     losartan (COZAAR) 100 MG tablet; Take 1 tablet by mouth Daily.  Dispense: 90 tablet; Refill: 3  -     hydroCHLOROthiazide (HYDRODIURIL) 25 MG tablet; Take 1 tablet by mouth Daily.  Dispense: 90 tablet; Refill: 3  -     amLODIPine (NORVASC) 5 MG tablet; Take 1 tablet by mouth Daily.  Dispense: 90 tablet; Refill: 3  -     CBC & Differential  -     Comprehensive Metabolic Panel    2. Hypothyroidism, unspecified type  -     levothyroxine (Synthroid) 100 MCG tablet; Take 1 tablet by mouth Daily.  Dispense: 90 tablet; Refill: 3  -     Thyroid Panel With TSH    3. Dyslipidemia  -     Lipid Panel With LDL / HDL Ratio    4. Chronic low back pain, unspecified back pain laterality, unspecified whether sciatica present    5. Prediabetes  -     Hemoglobin A1c      1. Lumbar back pain  - I am okay with him getting an epidural from pain management, and I will let him be referred back to pain management.    2. Hypothyroidism  - He will continue levothyroxine 100 mcg daily.     3. Dyslipidemia  - He will continue to monitor with diet and exercise.    4. Essential hypertension  - He will continue amlodipine 5 mg daily " plus hydrochlorothiazide 25 mg daily plus losartan 100 mg daily.    5. Allergies  - He will try Claritin daily.    6. Health maintenance  - He will receive his influenza vaccine today.  - We will obtain blood work today.      Follow Up   No follow-ups on file.  Patient was given instructions and counseling regarding his condition or for health maintenance advice. Please see specific information pulled into the AVS if appropriate.         Transcribed from ambient dictation for Rusty Hickey MD by Aleah Santos.  10/23/23   09:22 EDT    Patient or patient representative verbalized consent to the visit recording.  I have personally performed the services described in this document as transcribed by the above individual, and it is both accurate and complete.

## 2023-10-24 LAB
ALBUMIN SERPL-MCNC: 4.8 G/DL (ref 3.5–5.2)
ALBUMIN/GLOB SERPL: 1.8 G/DL
ALP SERPL-CCNC: 86 U/L (ref 39–117)
ALT SERPL-CCNC: 35 U/L (ref 1–41)
AST SERPL-CCNC: 27 U/L (ref 1–40)
BASOPHILS # BLD AUTO: 0.04 10*3/MM3 (ref 0–0.2)
BASOPHILS NFR BLD AUTO: 0.5 % (ref 0–1.5)
BILIRUB SERPL-MCNC: 0.7 MG/DL (ref 0–1.2)
BUN SERPL-MCNC: 4 MG/DL (ref 8–23)
BUN/CREAT SERPL: 5.3 (ref 7–25)
CALCIUM SERPL-MCNC: 10.3 MG/DL (ref 8.6–10.5)
CHLORIDE SERPL-SCNC: 96 MMOL/L (ref 98–107)
CHOLEST SERPL-MCNC: 170 MG/DL (ref 0–200)
CO2 SERPL-SCNC: 26.7 MMOL/L (ref 22–29)
CREAT SERPL-MCNC: 0.75 MG/DL (ref 0.76–1.27)
EGFRCR SERPLBLD CKD-EPI 2021: 97.1 ML/MIN/1.73
EOSINOPHIL # BLD AUTO: 0.44 10*3/MM3 (ref 0–0.4)
EOSINOPHIL NFR BLD AUTO: 5.2 % (ref 0.3–6.2)
ERYTHROCYTE [DISTWIDTH] IN BLOOD BY AUTOMATED COUNT: 12.1 % (ref 12.3–15.4)
FT4I SERPL CALC-MCNC: 2.1 (ref 1.2–4.9)
GLOBULIN SER CALC-MCNC: 2.7 GM/DL
GLUCOSE SERPL-MCNC: 96 MG/DL (ref 65–99)
HBA1C MFR BLD: 5.3 % (ref 4.8–5.6)
HCT VFR BLD AUTO: 43.6 % (ref 37.5–51)
HDLC SERPL-MCNC: 45 MG/DL (ref 40–60)
HGB BLD-MCNC: 15.1 G/DL (ref 13–17.7)
IMM GRANULOCYTES # BLD AUTO: 0.02 10*3/MM3 (ref 0–0.05)
IMM GRANULOCYTES NFR BLD AUTO: 0.2 % (ref 0–0.5)
LDLC SERPL CALC-MCNC: 102 MG/DL (ref 0–100)
LDLC/HDLC SERPL: 2.22 {RATIO}
LYMPHOCYTES # BLD AUTO: 1.58 10*3/MM3 (ref 0.7–3.1)
LYMPHOCYTES NFR BLD AUTO: 18.7 % (ref 19.6–45.3)
MCH RBC QN AUTO: 31.6 PG (ref 26.6–33)
MCHC RBC AUTO-ENTMCNC: 34.6 G/DL (ref 31.5–35.7)
MCV RBC AUTO: 91.2 FL (ref 79–97)
MONOCYTES # BLD AUTO: 0.6 10*3/MM3 (ref 0.1–0.9)
MONOCYTES NFR BLD AUTO: 7.1 % (ref 5–12)
NEUTROPHILS # BLD AUTO: 5.79 10*3/MM3 (ref 1.7–7)
NEUTROPHILS NFR BLD AUTO: 68.3 % (ref 42.7–76)
NRBC BLD AUTO-RTO: 0 /100 WBC (ref 0–0.2)
PLATELET # BLD AUTO: 388 10*3/MM3 (ref 140–450)
POTASSIUM SERPL-SCNC: 4.6 MMOL/L (ref 3.5–5.2)
PROT SERPL-MCNC: 7.5 G/DL (ref 6–8.5)
RBC # BLD AUTO: 4.78 10*6/MM3 (ref 4.14–5.8)
SODIUM SERPL-SCNC: 135 MMOL/L (ref 136–145)
T3RU NFR SERPL: 26 % (ref 24–39)
T4 SERPL-MCNC: 8.2 UG/DL (ref 4.5–12)
TRIGL SERPL-MCNC: 126 MG/DL (ref 0–150)
TSH SERPL DL<=0.005 MIU/L-ACNC: 1.97 UIU/ML (ref 0.45–4.5)
VLDLC SERPL CALC-MCNC: 23 MG/DL (ref 5–40)
WBC # BLD AUTO: 8.47 10*3/MM3 (ref 3.4–10.8)

## 2023-11-11 DIAGNOSIS — J30.9 ALLERGIC RHINITIS, UNSPECIFIED SEASONALITY, UNSPECIFIED TRIGGER: ICD-10-CM

## 2023-11-13 NOTE — TELEPHONE ENCOUNTER
Rx Refill Note  Requested Prescriptions     Pending Prescriptions Disp Refills    fluticasone (Flonase) 50 MCG/ACT nasal spray 18 mL 6     Si sprays into the nostril(s) as directed by provider Daily.      Last office visit with prescribing clinician: 10/23/2023   Last telemedicine visit with prescribing clinician: 7/3/2023   Next office visit with prescribing clinician: Visit date not found

## 2023-11-14 RX ORDER — FLUTICASONE PROPIONATE 50 MCG
2 SPRAY, SUSPENSION (ML) NASAL DAILY
Qty: 18 ML | Refills: 6 | Status: SHIPPED | OUTPATIENT
Start: 2023-11-14

## 2023-11-30 ENCOUNTER — HOSPITAL ENCOUNTER (OUTPATIENT)
Dept: GENERAL RADIOLOGY | Facility: HOSPITAL | Age: 68
Discharge: HOME | End: 2023-11-30
Payer: MEDICARE

## 2023-11-30 ENCOUNTER — ANESTHESIA EVENT (OUTPATIENT)
Dept: PAIN MEDICINE | Facility: HOSPITAL | Age: 68
End: 2023-11-30
Payer: MEDICARE

## 2023-11-30 ENCOUNTER — ANESTHESIA (OUTPATIENT)
Dept: PAIN MEDICINE | Facility: HOSPITAL | Age: 68
End: 2023-11-30
Payer: MEDICARE

## 2023-11-30 ENCOUNTER — HOSPITAL ENCOUNTER (OUTPATIENT)
Dept: PAIN MEDICINE | Facility: HOSPITAL | Age: 68
Discharge: HOME | End: 2023-11-30
Payer: MEDICARE

## 2023-11-30 VITALS
SYSTOLIC BLOOD PRESSURE: 111 MMHG | HEIGHT: 70 IN | OXYGEN SATURATION: 95 % | WEIGHT: 180 LBS | DIASTOLIC BLOOD PRESSURE: 77 MMHG | RESPIRATION RATE: 16 BRPM | HEART RATE: 77 BPM | BODY MASS INDEX: 25.77 KG/M2 | TEMPERATURE: 97.9 F

## 2023-11-30 DIAGNOSIS — R52 PAIN: ICD-10-CM

## 2023-11-30 DIAGNOSIS — M54.16 LUMBAR RADICULOPATHY: Primary | ICD-10-CM

## 2023-11-30 PROCEDURE — 25510000001 IOPAMIDOL 41 % SOLUTION: Performed by: ANESTHESIOLOGY

## 2023-11-30 PROCEDURE — 25010000002 METHYLPREDNISOLONE PER 80 MG: Performed by: ANESTHESIOLOGY

## 2023-11-30 PROCEDURE — 77003 FLUOROGUIDE FOR SPINE INJECT: CPT

## 2023-11-30 RX ORDER — MIDAZOLAM HYDROCHLORIDE 1 MG/ML
1 INJECTION INTRAMUSCULAR; INTRAVENOUS ONCE AS NEEDED
Status: DISCONTINUED | OUTPATIENT
Start: 2023-11-30 | End: 2023-12-01 | Stop reason: HOSPADM

## 2023-11-30 RX ORDER — METHYLPREDNISOLONE ACETATE 80 MG/ML
80 INJECTION, SUSPENSION INTRA-ARTICULAR; INTRALESIONAL; INTRAMUSCULAR; SOFT TISSUE ONCE
Status: COMPLETED | OUTPATIENT
Start: 2023-11-30 | End: 2023-11-30

## 2023-11-30 RX ORDER — LIDOCAINE HYDROCHLORIDE 10 MG/ML
1 INJECTION, SOLUTION INFILTRATION; PERINEURAL ONCE
Status: DISCONTINUED | OUTPATIENT
Start: 2023-11-30 | End: 2023-12-01 | Stop reason: HOSPADM

## 2023-11-30 RX ORDER — FENTANYL CITRATE 50 UG/ML
50 INJECTION, SOLUTION INTRAMUSCULAR; INTRAVENOUS ONCE
Status: DISCONTINUED | OUTPATIENT
Start: 2023-11-30 | End: 2023-12-01 | Stop reason: HOSPADM

## 2023-11-30 RX ORDER — IOPAMIDOL 408 MG/ML
10 INJECTION, SOLUTION INTRATHECAL
Status: COMPLETED | OUTPATIENT
Start: 2023-11-30 | End: 2023-11-30

## 2023-11-30 RX ADMIN — IOPAMIDOL 10 ML: 408 INJECTION, SOLUTION INTRATHECAL at 08:40

## 2023-11-30 RX ADMIN — METHYLPREDNISOLONE ACETATE 80 MG: 80 INJECTION, SUSPENSION INTRA-ARTICULAR; INTRALESIONAL; INTRAMUSCULAR; SOFT TISSUE at 08:40

## 2023-11-30 NOTE — DISCHARGE INSTRUCTIONS

## 2023-11-30 NOTE — H&P
[facility]    History and Physical    Patient Name: Yohan Lawler  :  1955  MRN:  [id number]  Date of Admission: 2023    Subjective     Patient is a 68 y.o. male presents with chief complaint of chronic, moderate low back and leg: right pain.  Onset of symptoms was gradual starting a few years ago.  Symptoms are associated/aggravated by activity. Symptoms improve with injection - more than 50% relief following prior lesi.  Presents for lesi today.      The following portions of the patients history were reviewed and updated as appropriate: current medications, allergies, past medical history, past surgical history, past family history, past social history, and problem list                Objective     Past Medical History:   Past Medical History:   Diagnosis Date    Arthritis     Disease of thyroid gland     GERD (gastroesophageal reflux disease)     Hypertension     Low back pain      Past Surgical History:   Past Surgical History:   Procedure Laterality Date    EPIDURAL BLOCK      EYE SURGERY      Detached Retuina     Family History:   Family History   Problem Relation Age of Onset    Arthritis Mother     Hypertension Mother     Thyroid disease Sister     Thyroid disease Sister      Social History:   Social History     Socioeconomic History    Marital status:    Tobacco Use    Smoking status: Never    Smokeless tobacco: Never   Vaping Use    Vaping Use: Never used   Substance and Sexual Activity    Alcohol use: Yes     Alcohol/week: 12.0 standard drinks of alcohol     Types: 12 Cans of beer per week    Drug use: No    Sexual activity: Not Currently       Vital Signs Range for the last 24 hours  Temperature: Temp:  [36.6 °C (97.9 °F)] 36.6 °C (97.9 °F)   Temp Source: Temp src: Infrared   BP: BP: (129)/(75) 129/75   Pulse: Heart Rate:  [92] 92   Respirations: Resp:  [16] 16   SPO2: SpO2:  [95 %] 95 %   O2 Amount (l/min):     O2 Devices Device (Oxygen Therapy): room air   Weight: Weight:   "[81.6 kg (180 lb)] 81.6 kg (180 lb)     Flowsheet Rows      Flowsheet Row First Filed Value   Admission Height 177.8 cm (70\") Documented at 11/30/2023 0812   Admission Weight 81.6 kg (180 lb) Documented at 11/30/2023 0812            --------------------------------------------------------------------------------    Current Outpatient Medications   Medication Sig Dispense Refill    amLODIPine (NORVASC) 5 MG tablet Take 1 tablet by mouth Daily. 90 tablet 3    Apoaequorin (PREVAGEN PO) Take 1 tablet by mouth Daily.      aspirin 81 MG tablet Take 1 tablet by mouth Daily.      fluticasone (Flonase) 50 MCG/ACT nasal spray 2 sprays into the nostril(s) as directed by provider Daily. 18 mL 6    hydroCHLOROthiazide (HYDRODIURIL) 25 MG tablet Take 1 tablet by mouth Daily. 90 tablet 3    ibuprofen (ADVIL,MOTRIN) 800 MG tablet Take 1 tablet by mouth Every 8 (Eight) Hours As Needed for Mild Pain . 30 tablet 0    levothyroxine (Synthroid) 100 MCG tablet Take 1 tablet by mouth Daily. 90 tablet 3    losartan (COZAAR) 100 MG tablet Take 1 tablet by mouth Daily. 90 tablet 3    MULTIPLE VITAMINS-MINERALS ER PO Take 1 tablet by mouth Daily.      Omega-3 Fatty Acids (FISH OIL) 1000 MG capsule capsule Take 1 capsule by mouth Daily With Breakfast.      omeprazole (priLOSEC) 40 MG capsule Take 1 capsule by mouth Daily. 90 capsule 2    prednisoLONE acetate (PRED FORTE) 1 % ophthalmic suspension Administer 1 drop into the left eye Daily. 10 mL 1    cyclobenzaprine (FLEXERIL) 10 MG tablet Take 1 tablet by mouth three times daily as needed for muscle spasm 90 tablet 0    diphenhydrAMINE-APAP, sleep,  MG/30ML liquid Take  by mouth.       Current Facility-Administered Medications   Medication Dose Route Frequency Provider Last Rate Last Admin    fentaNYL citrate (PF) (SUBLIMAZE) injection 50 mcg  50 mcg Intravenous Once ProtzerKelly MD        iopamidol (ISOVUE-M 200) injection 41%  10 mL Epidural Once in imaging Protzer, " Kelly Douglas MD        lidocaine (XYLOCAINE) 1 % injection 1 mL  1 mL Intradermal Once Kelly Chou MD        methylPREDNISolone acetate (DEPO-medrol) injection 80 mg  80 mg Epidural Once Kelly Chou MD        midazolam (VERSED) injection 1 mg  1 mg Intravenous Once PRN Kelly Chou MD           --------------------------------------------------------------------------------  Assessment & Plan      Anesthesia Evaluation     Patient summary reviewed and Nursing notes reviewed   no history of anesthetic complications:                Airway   Mallampati: II  Dental      Pulmonary - negative pulmonary ROS   Cardiovascular   Exercise tolerance: good (4-7 METS)    (+) hypertension      Neuro/Psych  (+) numbness  GI/Hepatic/Renal/Endo    (+) GERD, thyroid problem     Musculoskeletal     (+) back pain, chronic pain, radiculopathy Right lower extremity  Abdominal    Substance History   (+) alcohol use     OB/GYN negative ob/gyn ROS         Other   arthritis,                  Diagnosis and Plan    Treatment Plan  ASA 2   Patient has had previous injection/procedure with 50-75% improvement.   Procedures: Lumbar Epidural Steroid Injection(LESI), With fluoroscopy,      Anesthetic plan and risks discussed with patient.          Diagnosis     * Lumbar radiculopathy [M54.16]

## 2023-11-30 NOTE — ANESTHESIA PROCEDURE NOTES
PAIN Epidural block    Pre-sedation assessment completed: 11/30/2023 8:25 AM    Patient reassessed immediately prior to procedure    Patient location during procedure: pain clinic  Start Time: 11/30/2023 8:25 AM  Stop Time: 11/30/2023 8:40 AM  Indication:procedure for pain  Performed By  Anesthesiologist: Kelly Chou MD  Preanesthetic Checklist  Completed: patient identified, IV checked, site marked, risks and benefits discussed, surgical consent, monitors and equipment checked, pre-op evaluation and timeout performed  Additional Notes  Fluoro used.    Prep:  Pt Position:prone  Sterile Tech:cap, gloves, mask and sterile barrier  Prep:chlorhexidine gluconate and isopropyl alcohol  Monitoring:blood pressure monitoring, continuous pulse oximetry and EKG  Procedure:Sedation: no     Approach:right paramedian  Guidance: fluoroscopy  Location:lumbar  Level:L5-S1  Needle Type:Tuohy  Needle Gauge:20  Aspiration:negative  Medications:  Preservative Free Saline:3mL  Isovue:1mL  Comments:Dye spread c/w epidurogram.  Dx: lumbar radiculopathy. Depomedrol:80  Post Assessment:  Dressing:occlusive dressing applied  Pt Tolerance:patient tolerated the procedure well with no apparent complications  Complications:no

## 2023-12-04 DIAGNOSIS — J30.9 ALLERGIC RHINITIS, UNSPECIFIED SEASONALITY, UNSPECIFIED TRIGGER: ICD-10-CM

## 2023-12-04 RX ORDER — FLUTICASONE PROPIONATE 50 MCG
2 SPRAY, SUSPENSION (ML) NASAL DAILY
Qty: 18 ML | Refills: 6 | Status: SHIPPED | OUTPATIENT
Start: 2023-12-04

## 2024-01-14 DIAGNOSIS — J30.9 ALLERGIC RHINITIS, UNSPECIFIED SEASONALITY, UNSPECIFIED TRIGGER: ICD-10-CM

## 2024-01-15 RX ORDER — FLUTICASONE PROPIONATE 50 MCG
2 SPRAY, SUSPENSION (ML) NASAL DAILY
Qty: 18 ML | Refills: 6 | Status: SHIPPED | OUTPATIENT
Start: 2024-01-15

## 2024-01-15 RX ORDER — PREDNISOLONE ACETATE 10 MG/ML
1 SUSPENSION/ DROPS OPHTHALMIC DAILY
Qty: 10 ML | Refills: 1 | Status: SHIPPED | OUTPATIENT
Start: 2024-01-15

## 2024-01-15 NOTE — TELEPHONE ENCOUNTER
Rx Refill Note  Requested Prescriptions     Pending Prescriptions Disp Refills    prednisoLONE acetate (PRED FORTE) 1 % ophthalmic suspension 10 mL 1     Sig: Administer 1 drop into the left eye Daily.    fluticasone (Flonase) 50 MCG/ACT nasal spray 18 mL 6     Si sprays into the nostril(s) as directed by provider Daily.      Last office visit with prescribing clinician: 10/23/2023   Last telemedicine visit with prescribing clinician: Visit date not found   Next office visit with prescribing clinician: Visit date not found

## 2024-03-05 ENCOUNTER — ANESTHESIA (OUTPATIENT)
Dept: PAIN MEDICINE | Facility: HOSPITAL | Age: 69
End: 2024-03-05
Payer: MEDICARE

## 2024-03-05 ENCOUNTER — HOSPITAL ENCOUNTER (OUTPATIENT)
Dept: GENERAL RADIOLOGY | Facility: HOSPITAL | Age: 69
Discharge: HOME | End: 2024-03-05
Payer: MEDICARE

## 2024-03-05 ENCOUNTER — ANESTHESIA EVENT (OUTPATIENT)
Dept: PAIN MEDICINE | Facility: HOSPITAL | Age: 69
End: 2024-03-05
Payer: MEDICARE

## 2024-03-05 ENCOUNTER — HOSPITAL ENCOUNTER (OUTPATIENT)
Dept: PAIN MEDICINE | Facility: HOSPITAL | Age: 69
Discharge: HOME | End: 2024-03-05
Payer: MEDICARE

## 2024-03-05 VITALS
TEMPERATURE: 98 F | SYSTOLIC BLOOD PRESSURE: 118 MMHG | RESPIRATION RATE: 16 BRPM | OXYGEN SATURATION: 96 % | HEART RATE: 76 BPM | DIASTOLIC BLOOD PRESSURE: 86 MMHG

## 2024-03-05 DIAGNOSIS — R52 PAIN: ICD-10-CM

## 2024-03-05 DIAGNOSIS — M54.16 LUMBAR RADICULOPATHY: ICD-10-CM

## 2024-03-05 PROCEDURE — 25510000001 IOPAMIDOL 41 % SOLUTION: Performed by: ANESTHESIOLOGY

## 2024-03-05 PROCEDURE — 77003 FLUOROGUIDE FOR SPINE INJECT: CPT

## 2024-03-05 PROCEDURE — 25010000002 METHYLPREDNISOLONE PER 80 MG: Performed by: ANESTHESIOLOGY

## 2024-03-05 RX ORDER — LIDOCAINE HYDROCHLORIDE 10 MG/ML
1 INJECTION, SOLUTION INFILTRATION; PERINEURAL ONCE
Status: DISCONTINUED | OUTPATIENT
Start: 2024-03-05 | End: 2024-03-06 | Stop reason: HOSPADM

## 2024-03-05 RX ORDER — IOPAMIDOL 408 MG/ML
10 INJECTION, SOLUTION INTRATHECAL
Status: COMPLETED | OUTPATIENT
Start: 2024-03-05 | End: 2024-03-05

## 2024-03-05 RX ORDER — MIDAZOLAM HYDROCHLORIDE 1 MG/ML
1 INJECTION INTRAMUSCULAR; INTRAVENOUS ONCE AS NEEDED
Status: DISCONTINUED | OUTPATIENT
Start: 2024-03-05 | End: 2024-03-06 | Stop reason: HOSPADM

## 2024-03-05 RX ORDER — FENTANYL CITRATE 50 UG/ML
50 INJECTION, SOLUTION INTRAMUSCULAR; INTRAVENOUS ONCE
Status: DISCONTINUED | OUTPATIENT
Start: 2024-03-05 | End: 2024-03-06 | Stop reason: HOSPADM

## 2024-03-05 RX ORDER — METHYLPREDNISOLONE ACETATE 80 MG/ML
80 INJECTION, SUSPENSION INTRA-ARTICULAR; INTRALESIONAL; INTRAMUSCULAR; SOFT TISSUE ONCE
Status: COMPLETED | OUTPATIENT
Start: 2024-03-05 | End: 2024-03-05

## 2024-03-05 RX ADMIN — METHYLPREDNISOLONE ACETATE 80 MG: 80 INJECTION, SUSPENSION INTRA-ARTICULAR; INTRALESIONAL; INTRAMUSCULAR; SOFT TISSUE at 07:48

## 2024-03-05 RX ADMIN — IOPAMIDOL 10 ML: 408 INJECTION, SOLUTION INTRATHECAL at 07:48

## 2024-03-05 NOTE — H&P
INTERVAL HISTORY:    The patient returns for another Lumbar epidural steroid injection today.  They have received at least 50% improvement since their last injection with a pain level of 4/10 at its worst recently.    Conservative measures tried in the interim rest Flexeril ibuprofen    Current Outpatient Medications on File Prior to Encounter   Medication Sig Dispense Refill    amLODIPine (NORVASC) 5 MG tablet Take 1 tablet by mouth Daily. 90 tablet 3    Apoaequorin (PREVAGEN PO) Take 1 tablet by mouth Daily.      aspirin 81 MG tablet Take 1 tablet by mouth Daily.      cyclobenzaprine (FLEXERIL) 10 MG tablet Take 1 tablet by mouth three times daily as needed for muscle spasm 90 tablet 0    diphenhydrAMINE-APAP, sleep,  MG/30ML liquid Take  by mouth.      fluticasone (Flonase) 50 MCG/ACT nasal spray 2 sprays into the nostril(s) as directed by provider Daily. 18 mL 6    hydroCHLOROthiazide (HYDRODIURIL) 25 MG tablet Take 1 tablet by mouth Daily. 90 tablet 3    ibuprofen (ADVIL,MOTRIN) 800 MG tablet Take 1 tablet by mouth Every 8 (Eight) Hours As Needed for Mild Pain . 30 tablet 0    levothyroxine (Synthroid) 100 MCG tablet Take 1 tablet by mouth Daily. 90 tablet 3    losartan (COZAAR) 100 MG tablet Take 1 tablet by mouth Daily. 90 tablet 3    MULTIPLE VITAMINS-MINERALS ER PO Take 1 tablet by mouth Daily.      Omega-3 Fatty Acids (FISH OIL) 1000 MG capsule capsule Take 1 capsule by mouth Daily With Breakfast.      omeprazole (priLOSEC) 40 MG capsule Take 1 capsule by mouth Daily. 90 capsule 2    prednisoLONE acetate (PRED FORTE) 1 % ophthalmic suspension Administer 1 drop into the left eye Daily. 10 mL 1     No current facility-administered medications on file prior to encounter.       Past Medical History:   Diagnosis Date    Arthritis     Disease of thyroid gland     GERD (gastroesophageal reflux disease)     Hypertension     Low back pain        No hematologic infectious or constitutional symptoms  Negative  screen for CEE      Exam:  There were no vitals taken for this visit.  Airway Mallampatti 2  Alert and oriented      Diagnosis:  Post-Op Diagnosis Codes:     * Lumbar radiculopathy [M54.16]    Plan:  Lumbar 5 epidural steroid injection under fluoroscopic guidance    I have encouraged them to continue:  1.  Physical therapy exercises at home as prescribed by physical therapy or from the pain clinic handout.  Continuation of these exercises every day, or multiple times per week, even when the patient has good pain relief, was stressed to the patient as a preventative measure to decrease the frequency and severity of future pain episodes.  2.  Continue pain medicines as already prescribed.  If patient not currently taking any, it is recommended to begin Acetaminophen 1000 mg po q 8 hours.  If other medicines containing Acetaminophen are currently prescribed, maintain daily dose at 3000mg.    3.  If they can tolerate NSAIDS, it is recommended to take Ibuprofen 600 mg po q 6 hours for 7 days during pain exacerbations.   Alternatively, they may substitute an NSAID of their choice (e.g. Aleve)  4.  Heat and ice to the affected area as tolerated for pain control.   5.  Low impact exercise such as walking or water exercise was recommended to maintain overall health and aid in weight control.   6.  Follow up as needed for subsequent injections.

## 2024-03-05 NOTE — DISCHARGE INSTRUCTIONS

## 2024-03-05 NOTE — ANESTHESIA PROCEDURE NOTES
PAIN Epidural block      Patient reassessed immediately prior to procedure    Patient location during procedure: pain clinic  Indication:procedure for pain  Performed By  Anesthesiologist: Scottie Celeste MD  Preanesthetic Checklist  Completed: patient identified and risks and benefits discussed  Additional Notes  Diagnosis:     Post-Op Diagnosis Codes:     * Lumbar radiculopathy [M54.16]    Sedation:  none    Sedation time:    A lumbar epidural steroid injection with fluoroscopic guidance and an Isovue dye epidurogram was performed.  Under fluoroscopic guidance, the epidural space was identified and accessed, confirmed by loss of resistance to saline followed by injection of Isovue dye, which shows a good epidurogram.  The above medications were injected uneventfully.    Prep:  Pt Position:prone  Sterile Tech:cap, gloves, mask and sterile barrier  Prep:chlorhexidine gluconate and isopropyl alcohol  Monitoring:blood pressure monitoring, continuous pulse oximetry and EKG  Procedure:Sedation: no     Approach:right paramedian  Guidance: fluoroscopy  Location:lumbar  Interspace: L5-S1.  Needle Type:Tuohy  Needle Gauge:20  Aspiration:negative  Medications:  Preservative Free Saline:1mL  Isovue:1mL  Comments:Isovue dye reveals good epidurogram  Depomedrol 80 mg  Post Assessment:  Pt Tolerance:patient tolerated the procedure well with no apparent complications  Complications:no

## 2024-03-09 DIAGNOSIS — E03.9 HYPOTHYROIDISM, UNSPECIFIED TYPE: ICD-10-CM

## 2024-03-09 DIAGNOSIS — I10 ESSENTIAL HYPERTENSION: ICD-10-CM

## 2024-03-09 DIAGNOSIS — J30.9 ALLERGIC RHINITIS, UNSPECIFIED SEASONALITY, UNSPECIFIED TRIGGER: ICD-10-CM

## 2024-03-11 RX ORDER — LOSARTAN POTASSIUM 100 MG/1
100 TABLET ORAL DAILY
Qty: 90 TABLET | Refills: 3 | Status: SHIPPED | OUTPATIENT
Start: 2024-03-11

## 2024-03-11 RX ORDER — HYDROCHLOROTHIAZIDE 25 MG/1
25 TABLET ORAL DAILY
Qty: 90 TABLET | Refills: 3 | Status: SHIPPED | OUTPATIENT
Start: 2024-03-11

## 2024-03-11 RX ORDER — FLUTICASONE PROPIONATE 50 MCG
2 SPRAY, SUSPENSION (ML) NASAL DAILY
Qty: 18 ML | Refills: 6 | Status: SHIPPED | OUTPATIENT
Start: 2024-03-11

## 2024-03-11 RX ORDER — AMLODIPINE BESYLATE 5 MG/1
5 TABLET ORAL DAILY
Qty: 90 TABLET | Refills: 3 | Status: SHIPPED | OUTPATIENT
Start: 2024-03-11

## 2024-03-11 RX ORDER — LEVOTHYROXINE SODIUM 0.1 MG/1
100 TABLET ORAL DAILY
Qty: 90 TABLET | Refills: 3 | Status: SHIPPED | OUTPATIENT
Start: 2024-03-11

## 2024-04-05 ENCOUNTER — OFFICE VISIT (OUTPATIENT)
Dept: INTERNAL MEDICINE | Facility: CLINIC | Age: 69
End: 2024-04-05
Payer: MEDICARE

## 2024-04-05 VITALS
BODY MASS INDEX: 27.35 KG/M2 | DIASTOLIC BLOOD PRESSURE: 90 MMHG | WEIGHT: 191 LBS | SYSTOLIC BLOOD PRESSURE: 140 MMHG | HEIGHT: 70 IN | HEART RATE: 79 BPM | OXYGEN SATURATION: 97 % | RESPIRATION RATE: 14 BRPM

## 2024-04-05 DIAGNOSIS — R73.03 PREDIABETES: ICD-10-CM

## 2024-04-05 DIAGNOSIS — E53.8 B12 DEFICIENCY: ICD-10-CM

## 2024-04-05 DIAGNOSIS — Z00.00 MEDICARE ANNUAL WELLNESS VISIT, SUBSEQUENT: Primary | ICD-10-CM

## 2024-04-05 DIAGNOSIS — R19.5 DARK STOOLS: ICD-10-CM

## 2024-04-05 DIAGNOSIS — Z00.00 HEALTHCARE MAINTENANCE: ICD-10-CM

## 2024-04-05 DIAGNOSIS — I10 ESSENTIAL HYPERTENSION: ICD-10-CM

## 2024-04-05 DIAGNOSIS — E03.9 HYPOTHYROIDISM, UNSPECIFIED TYPE: ICD-10-CM

## 2024-04-05 DIAGNOSIS — J01.00 ACUTE NON-RECURRENT MAXILLARY SINUSITIS: ICD-10-CM

## 2024-04-05 DIAGNOSIS — E78.5 DYSLIPIDEMIA: ICD-10-CM

## 2024-04-05 RX ORDER — AMOXICILLIN 500 MG/1
500 CAPSULE ORAL 2 TIMES DAILY
Qty: 14 CAPSULE | Refills: 0 | Status: SHIPPED | OUTPATIENT
Start: 2024-04-05 | End: 2024-04-12

## 2024-04-05 RX ORDER — CYANOCOBALAMIN 1000 UG/ML
1000 INJECTION, SOLUTION INTRAMUSCULAR; SUBCUTANEOUS
Status: SHIPPED | OUTPATIENT
Start: 2024-04-05 | End: 2024-07-04

## 2024-04-05 RX ADMIN — CYANOCOBALAMIN 1000 MCG: 1000 INJECTION, SOLUTION INTRAMUSCULAR; SUBCUTANEOUS at 09:42

## 2024-04-05 NOTE — PROGRESS NOTES
The ABCs of the Annual Wellness Visit  Subsequent Medicare Wellness Visit    Subjective      Yohan Lawler is a 68 y.o. male who presents for a Subsequent Medicare Wellness Visit.    The following portions of the patient's history were reviewed and   updated as appropriate: allergies, current medications, past family history, past medical history, past social history, past surgical history, and problem list.    Compared to one year ago, the patient feels his physical   health is worse.    Compared to one year ago, the patient feels his mental   health is worse.    Recent Hospitalizations:  He was not admitted to the hospital during the last year.       Current Medical Providers:  Patient Care Team:  uRsty Hickey MD as PCP - General (Family Medicine)    Outpatient Medications Prior to Visit   Medication Sig Dispense Refill    amLODIPine (NORVASC) 5 MG tablet Take 1 tablet by mouth Daily. 90 tablet 3    Apoaequorin (PREVAGEN PO) Take 1 tablet by mouth Daily.      aspirin 81 MG tablet Take 1 tablet by mouth Daily.      cyclobenzaprine (FLEXERIL) 10 MG tablet Take 1 tablet by mouth three times daily as needed for muscle spasm 90 tablet 0    diphenhydrAMINE-APAP, sleep,  MG/30ML liquid Take  by mouth.      fluticasone (Flonase) 50 MCG/ACT nasal spray 2 sprays into the nostril(s) as directed by provider Daily. 18 mL 6    hydroCHLOROthiazide 25 MG tablet Take 1 tablet by mouth Daily. 90 tablet 3    ibuprofen (ADVIL,MOTRIN) 800 MG tablet Take 1 tablet by mouth Every 8 (Eight) Hours As Needed for Mild Pain . 30 tablet 0    levothyroxine (Synthroid) 100 MCG tablet Take 1 tablet by mouth Daily. 90 tablet 3    losartan (COZAAR) 100 MG tablet Take 1 tablet by mouth Daily. 90 tablet 3    MULTIPLE VITAMINS-MINERALS ER PO Take 1 tablet by mouth Daily.      Omega-3 Fatty Acids (FISH OIL) 1000 MG capsule capsule Take 1 capsule by mouth Daily With Breakfast.      omeprazole (priLOSEC) 40 MG capsule Take 1 capsule by  "mouth Daily. 90 capsule 2    prednisoLONE acetate (PRED FORTE) 1 % ophthalmic suspension Administer 1 drop into the left eye Daily. 10 mL 1     No facility-administered medications prior to visit.       No opioid medication identified on active medication list. I have reviewed chart for other potential  high risk medication/s and harmful drug interactions in the elderly.        Aspirin is on active medication list. Aspirin use is indicated based on review of current medical condition/s. Pros and cons of this therapy have been discussed today. Benefits of this medication outweigh potential harm.  Patient has been encouraged to continue taking this medication.  .      Patient Active Problem List   Diagnosis    Essential hypertension    Hypothyroidism    GERD (gastroesophageal reflux disease)    Blood glucose elevated    Bursitis    Carpal tunnel syndrome    Tingling    Benign essential HTN    Personal history of colonic polyps    Polyp of colon    Hearing loss    Esophageal reflux     Advance Care Planning   Advance Care Planning     Advance Directive is not on file.  ACP discussion was held with the patient during this visit. Patient has an advance directive (not in EMR), copy requested.     Objective    Vitals:    04/05/24 0932   BP: 140/90   BP Location: Left arm   Patient Position: Sitting   Cuff Size: Adult   Pulse: 79   Resp: 14   SpO2: 97%   Weight: 86.6 kg (191 lb)   Height: 177.8 cm (70\")     Estimated body mass index is 27.41 kg/m² as calculated from the following:    Height as of this encounter: 177.8 cm (70\").    Weight as of this encounter: 86.6 kg (191 lb).           Does the patient have evidence of cognitive impairment?   No            HEALTH RISK ASSESSMENT    Smoking Status:  Social History     Tobacco Use   Smoking Status Never   Smokeless Tobacco Never     Alcohol Consumption:  Social History     Substance and Sexual Activity   Alcohol Use Not Currently    Alcohol/week: 12.0 standard drinks of " alcohol     Fall Risk Screen:    STEADI Fall Risk Assessment has not been completed.    Depression Screenin/5/2024     9:30 AM   PHQ-2/PHQ-9 Depression Screening   Little Interest or Pleasure in Doing Things 0-->not at all   Feeling Down, Depressed or Hopeless 0-->not at all   PHQ-9: Brief Depression Severity Measure Score 0       Health Habits and Functional and Cognitive Screenin/4/2024     7:29 AM   Functional & Cognitive Status   Do you have difficulty preparing food and eating? No   Do you have difficulty bathing yourself, getting dressed or grooming yourself? No   Do you have difficulty using the toilet? No   Do you have difficulty moving around from place to place? No   Do you have trouble with steps or getting out of a bed or a chair? No   Current Diet Well Balanced Diet   Dental Exam Up to date   Eye Exam Up to date   Exercise (times per week) 6 times per week   Current Exercises Include Cardiovascular Workout;Home Fitness Gym;House Cleaning   Do you need help using the phone?  No   Are you deaf or do you have serious difficulty hearing?  Yes   Do you need help to go to places out of walking distance? No   Do you need help shopping? No   Do you need help preparing meals?  No   Do you need help with housework?  No   Do you need help with laundry? No   Do you need help taking your medications? No   Do you need help managing money? No   Do you ever drive or ride in a car without wearing a seat belt? No   Have you felt unusual stress, anger or loneliness in the last month? No   Who do you live with? Spouse   If you need help, do you have trouble finding someone available to you? No   Have you been bothered in the last four weeks by sexual problems? No   Do you have difficulty concentrating, remembering or making decisions? No       Age-appropriate Screening Schedule:  Refer to the list below for future screening recommendations based on patient's age, sex and/or medical conditions. Orders for  these recommended tests are listed in the plan section. The patient has been provided with a written plan.    Health Maintenance   Topic Date Due    ZOSTER VACCINE (1 of 2) Never done    RSV Vaccine - Adults (1 - 1-dose 60+ series) Never done    COVID-19 Vaccine (5 - 2023-24 season) 10/30/2024 (Originally 9/1/2023)    ANNUAL WELLNESS VISIT  07/27/2024    BMI FOLLOWUP  07/27/2024    INFLUENZA VACCINE  08/01/2024    COLORECTAL CANCER SCREENING  02/26/2025    TDAP/TD VACCINES (3 - Td or Tdap) 08/05/2026    HEPATITIS C SCREENING  Completed    Pneumococcal Vaccine 65+  Completed                  CMS Preventative Services Quick Reference  Risk Factors Identified During Encounter:    None Identified    The above risks/problems have been discussed with the patient.  Pertinent information has been shared with the patient in the After Visit Summary.    Diagnoses and all orders for this visit:    1. Medicare annual wellness visit, subsequent (Primary)    2. B12 deficiency  -     cyanocobalamin injection 1,000 mcg  -     Vitamin B12    3. Acute non-recurrent maxillary sinusitis  -     amoxicillin (AMOXIL) 500 MG capsule; Take 1 capsule by mouth 2 (Two) Times a Day for 7 days.  Dispense: 14 capsule; Refill: 0    4. Dark stools    5. Essential hypertension  -     CBC & Differential  -     Comprehensive Metabolic Panel    6. Hypothyroidism, unspecified type  -     Thyroid Panel With TSH    7. Dyslipidemia  -     Lipid Panel With LDL / HDL Ratio    8. Prediabetes  -     Hemoglobin A1c    9. Healthcare maintenance        Follow Up:   Next Medicare Wellness visit to be scheduled in 1 year.      An After Visit Summary and PPPS were made available to the patient.

## 2024-04-06 LAB
ALBUMIN SERPL-MCNC: 4.6 G/DL (ref 3.5–5.2)
ALBUMIN/GLOB SERPL: 1.6 G/DL
ALP SERPL-CCNC: 76 U/L (ref 39–117)
ALT SERPL-CCNC: 35 U/L (ref 1–41)
AST SERPL-CCNC: 27 U/L (ref 1–40)
BASOPHILS # BLD AUTO: 0.04 10*3/MM3 (ref 0–0.2)
BASOPHILS NFR BLD AUTO: 0.6 % (ref 0–1.5)
BILIRUB SERPL-MCNC: 0.7 MG/DL (ref 0–1.2)
BUN SERPL-MCNC: 8 MG/DL (ref 8–23)
BUN/CREAT SERPL: 9.4 (ref 7–25)
CALCIUM SERPL-MCNC: 9.4 MG/DL (ref 8.6–10.5)
CHLORIDE SERPL-SCNC: 95 MMOL/L (ref 98–107)
CHOLEST SERPL-MCNC: 163 MG/DL (ref 0–200)
CO2 SERPL-SCNC: 25.7 MMOL/L (ref 22–29)
CREAT SERPL-MCNC: 0.85 MG/DL (ref 0.76–1.27)
EGFRCR SERPLBLD CKD-EPI 2021: 93.5 ML/MIN/1.73
EOSINOPHIL # BLD AUTO: 0.3 10*3/MM3 (ref 0–0.4)
EOSINOPHIL NFR BLD AUTO: 4.9 % (ref 0.3–6.2)
ERYTHROCYTE [DISTWIDTH] IN BLOOD BY AUTOMATED COUNT: 12.4 % (ref 12.3–15.4)
FT4I SERPL CALC-MCNC: 1.7 (ref 1.2–4.9)
GLOBULIN SER CALC-MCNC: 2.9 GM/DL
GLUCOSE SERPL-MCNC: 92 MG/DL (ref 65–99)
HBA1C MFR BLD: 5.5 % (ref 4.8–5.6)
HCT VFR BLD AUTO: 42.4 % (ref 37.5–51)
HDLC SERPL-MCNC: 45 MG/DL (ref 40–60)
HGB BLD-MCNC: 14.7 G/DL (ref 13–17.7)
IMM GRANULOCYTES # BLD AUTO: 0.02 10*3/MM3 (ref 0–0.05)
IMM GRANULOCYTES NFR BLD AUTO: 0.3 % (ref 0–0.5)
LDLC SERPL CALC-MCNC: 100 MG/DL (ref 0–100)
LDLC/HDLC SERPL: 2.18 {RATIO}
LYMPHOCYTES # BLD AUTO: 1.67 10*3/MM3 (ref 0.7–3.1)
LYMPHOCYTES NFR BLD AUTO: 27.1 % (ref 19.6–45.3)
MCH RBC QN AUTO: 31.5 PG (ref 26.6–33)
MCHC RBC AUTO-ENTMCNC: 34.7 G/DL (ref 31.5–35.7)
MCV RBC AUTO: 91 FL (ref 79–97)
MONOCYTES # BLD AUTO: 0.51 10*3/MM3 (ref 0.1–0.9)
MONOCYTES NFR BLD AUTO: 8.3 % (ref 5–12)
NEUTROPHILS # BLD AUTO: 3.62 10*3/MM3 (ref 1.7–7)
NEUTROPHILS NFR BLD AUTO: 58.8 % (ref 42.7–76)
NRBC BLD AUTO-RTO: 0 /100 WBC (ref 0–0.2)
PLATELET # BLD AUTO: 390 10*3/MM3 (ref 140–450)
POTASSIUM SERPL-SCNC: 4 MMOL/L (ref 3.5–5.2)
PROT SERPL-MCNC: 7.5 G/DL (ref 6–8.5)
RBC # BLD AUTO: 4.66 10*6/MM3 (ref 4.14–5.8)
SODIUM SERPL-SCNC: 133 MMOL/L (ref 136–145)
T3RU NFR SERPL: 23 % (ref 24–39)
T4 SERPL-MCNC: 7.3 UG/DL (ref 4.5–12)
TRIGL SERPL-MCNC: 99 MG/DL (ref 0–150)
TSH SERPL DL<=0.005 MIU/L-ACNC: 12.4 UIU/ML (ref 0.45–4.5)
VIT B12 SERPL-MCNC: >2000 PG/ML (ref 211–946)
VLDLC SERPL CALC-MCNC: 18 MG/DL (ref 5–40)
WBC # BLD AUTO: 6.16 10*3/MM3 (ref 3.4–10.8)

## 2024-04-06 RX ORDER — LEVOTHYROXINE SODIUM 112 MCG
112 TABLET ORAL DAILY
Qty: 30 TABLET | Refills: 11 | Status: SHIPPED | OUTPATIENT
Start: 2024-04-06

## 2024-04-07 NOTE — PROGRESS NOTES
Please inform the patient of the following abnormal results. Is the patient doing his thyroid medication? TSH is elevated, will like to switch to branded SYNTHROID 112MCG daily. This is branded and higher dose.

## 2024-04-09 ENCOUNTER — PATIENT MESSAGE (OUTPATIENT)
Dept: INTERNAL MEDICINE | Facility: CLINIC | Age: 69
End: 2024-04-09
Payer: MEDICARE

## 2024-04-09 DIAGNOSIS — E03.9 HYPOTHYROIDISM, UNSPECIFIED TYPE: Primary | ICD-10-CM

## 2024-04-14 NOTE — PROGRESS NOTES
"Chief Complaint  Medicare Wellness-subsequent    Subjective          Yohan Lawler presents to [facility]  History of Present Illness  The patient is a 68-year-old male who presents for evaluation of multiple medical concerns.    The patient reports a slight decrease in energy levels, which he suspects may be related to his sinus issues. He has a history of mild sinus issues during the spring, which have recently exacerbated. He is currently using Flonase and experiences an enlarged amount of discharge in the mornings.    The patient reported observing blood in his stool for the first time yesterday, which was dark in color. He speculates that consuming prune juice and grapes may have contributed to the dark stool. He notes that his stools are typically of normal color. He is currently on a daily regimen of ibuprofen, taken nightly.    Objective   Vital Signs:   /90 (BP Location: Left arm, Patient Position: Sitting, Cuff Size: Adult)   Pulse 79   Resp 14   Ht 177.8 cm (70\")   Wt 86.6 kg (191 lb)   SpO2 97%   BMI 27.41 kg/m²     Physical Exam  Vitals and nursing note reviewed.   Constitutional:       Appearance: He is well-developed.   HENT:      Head: Normocephalic and atraumatic.   Musculoskeletal:      Cervical back: Normal range of motion and neck supple.   Neurological:      Mental Status: He is alert and oriented to person, place, and time.   Psychiatric:         Behavior: Behavior normal.         Physical Exam  Vital Signs  The patient's blood pressure is 140/90.     Result Review :                 Assessment and Plan    Diagnoses and all orders for this visit:    1. Medicare annual wellness visit, subsequent (Primary)    2. B12 deficiency  -     cyanocobalamin injection 1,000 mcg  -     Vitamin B12    3. Acute non-recurrent maxillary sinusitis  -     amoxicillin (AMOXIL) 500 MG capsule; Take 1 capsule by mouth 2 (Two) Times a Day for 7 days.  Dispense: 14 capsule; " Refill: 0    4. Dark stools    5. Essential hypertension  -     CBC & Differential  -     Comprehensive Metabolic Panel    6. Hypothyroidism, unspecified type  -     Thyroid Panel With TSH    7. Dyslipidemia  -     Lipid Panel With LDL / HDL Ratio    8. Prediabetes  -     Hemoglobin A1c    9. Healthcare maintenance      Assessment & Plan  1. Sinusitis.  The patient will be initiated on a course of amoxicillin.    2. Melena.  The patient's blood pressure readings today are within the high-normal range. The patient is advised to discontinue the use of ibuprofen. Should the melena persist, the patient is advised to seek immediate medical attention at the Emergency Room.      Follow Up   No follow-ups on file.  Patient was given instructions and counseling regarding his condition or for health maintenance advice. Please see specific information pulled into the AVS if appropriate.           Patient or patient representative verbalized consent for the use of Ambient Listening during the visit with  Rusty Hickey MD for chart documentation. 4/14/2024  16:26 EDT

## 2024-04-17 ENCOUNTER — PATIENT MESSAGE (OUTPATIENT)
Dept: INTERNAL MEDICINE | Facility: CLINIC | Age: 69
End: 2024-04-17
Payer: MEDICARE

## 2024-04-24 RX ORDER — PREDNISOLONE ACETATE 10 MG/ML
1 SUSPENSION/ DROPS OPHTHALMIC DAILY
Qty: 10 ML | Refills: 1 | Status: SHIPPED | OUTPATIENT
Start: 2024-04-24

## 2024-05-07 RX ORDER — LEVOTHYROXINE SODIUM 125 MCG
125 TABLET ORAL DAILY
Qty: 30 TABLET | Refills: 11 | Status: SHIPPED | OUTPATIENT
Start: 2024-05-07

## 2024-06-02 DIAGNOSIS — J30.9 ALLERGIC RHINITIS, UNSPECIFIED SEASONALITY, UNSPECIFIED TRIGGER: ICD-10-CM

## 2024-06-02 DIAGNOSIS — I10 ESSENTIAL HYPERTENSION: ICD-10-CM

## 2024-06-03 RX ORDER — FLUTICASONE PROPIONATE 50 MCG
2 SPRAY, SUSPENSION (ML) NASAL DAILY
Qty: 18 ML | Refills: 6 | Status: SHIPPED | OUTPATIENT
Start: 2024-06-03

## 2024-06-03 RX ORDER — HYDROCHLOROTHIAZIDE 25 MG/1
25 TABLET ORAL DAILY
Qty: 90 TABLET | Refills: 3 | Status: SHIPPED | OUTPATIENT
Start: 2024-06-03

## 2024-06-27 DIAGNOSIS — J30.9 ALLERGIC RHINITIS, UNSPECIFIED SEASONALITY, UNSPECIFIED TRIGGER: ICD-10-CM

## 2024-06-28 RX ORDER — FLUTICASONE PROPIONATE 50 MCG
2 SPRAY, SUSPENSION (ML) NASAL DAILY
Qty: 18 ML | Refills: 6 | Status: SHIPPED | OUTPATIENT
Start: 2024-06-28

## 2024-07-01 DIAGNOSIS — E53.8 B12 DEFICIENCY: ICD-10-CM

## 2024-07-01 DIAGNOSIS — I10 ESSENTIAL HYPERTENSION: Primary | ICD-10-CM

## 2024-07-01 DIAGNOSIS — E03.9 HYPOTHYROIDISM, UNSPECIFIED TYPE: ICD-10-CM

## 2024-07-01 DIAGNOSIS — R73.03 PREDIABETES: ICD-10-CM

## 2024-07-01 DIAGNOSIS — E78.5 DYSLIPIDEMIA: ICD-10-CM

## 2024-07-01 RX ORDER — LEVOTHYROXINE SODIUM 125 MCG
125 TABLET ORAL DAILY
Qty: 30 TABLET | Refills: 11 | Status: SHIPPED | OUTPATIENT
Start: 2024-07-01 | End: 2024-07-07

## 2024-07-03 LAB
ALBUMIN SERPL-MCNC: 4.4 G/DL (ref 3.5–5.2)
ALBUMIN/GLOB SERPL: 1.7 G/DL
ALP SERPL-CCNC: 71 U/L (ref 39–117)
ALT SERPL-CCNC: 41 U/L (ref 1–41)
AST SERPL-CCNC: 29 U/L (ref 1–40)
BASOPHILS # BLD AUTO: 0.03 10*3/MM3 (ref 0–0.2)
BASOPHILS NFR BLD AUTO: 0.5 % (ref 0–1.5)
BILIRUB SERPL-MCNC: 0.6 MG/DL (ref 0–1.2)
BUN SERPL-MCNC: 5 MG/DL (ref 8–23)
BUN/CREAT SERPL: 6.4 (ref 7–25)
CALCIUM SERPL-MCNC: 9.8 MG/DL (ref 8.6–10.5)
CHLORIDE SERPL-SCNC: 94 MMOL/L (ref 98–107)
CHOLEST SERPL-MCNC: 166 MG/DL (ref 0–200)
CO2 SERPL-SCNC: 25.4 MMOL/L (ref 22–29)
CREAT SERPL-MCNC: 0.78 MG/DL (ref 0.76–1.27)
EGFRCR SERPLBLD CKD-EPI 2021: 95.9 ML/MIN/1.73
EOSINOPHIL # BLD AUTO: 0.36 10*3/MM3 (ref 0–0.4)
EOSINOPHIL NFR BLD AUTO: 6.3 % (ref 0.3–6.2)
ERYTHROCYTE [DISTWIDTH] IN BLOOD BY AUTOMATED COUNT: 12.5 % (ref 12.3–15.4)
FT4I SERPL CALC-MCNC: 2.1 (ref 1.2–4.9)
GLOBULIN SER CALC-MCNC: 2.6 GM/DL
GLUCOSE SERPL-MCNC: 100 MG/DL (ref 65–99)
HBA1C MFR BLD: 5.3 % (ref 4.8–5.6)
HCT VFR BLD AUTO: 42.1 % (ref 37.5–51)
HDLC SERPL-MCNC: 52 MG/DL (ref 40–60)
HGB BLD-MCNC: 14.5 G/DL (ref 13–17.7)
IMM GRANULOCYTES # BLD AUTO: 0.03 10*3/MM3 (ref 0–0.05)
IMM GRANULOCYTES NFR BLD AUTO: 0.5 % (ref 0–0.5)
LDLC SERPL CALC-MCNC: 98 MG/DL (ref 0–100)
LDLC/HDLC SERPL: 1.85 {RATIO}
LYMPHOCYTES # BLD AUTO: 1.6 10*3/MM3 (ref 0.7–3.1)
LYMPHOCYTES NFR BLD AUTO: 28.2 % (ref 19.6–45.3)
MCH RBC QN AUTO: 31.3 PG (ref 26.6–33)
MCHC RBC AUTO-ENTMCNC: 34.4 G/DL (ref 31.5–35.7)
MCV RBC AUTO: 90.9 FL (ref 79–97)
MONOCYTES # BLD AUTO: 0.38 10*3/MM3 (ref 0.1–0.9)
MONOCYTES NFR BLD AUTO: 6.7 % (ref 5–12)
NEUTROPHILS # BLD AUTO: 3.27 10*3/MM3 (ref 1.7–7)
NEUTROPHILS NFR BLD AUTO: 57.8 % (ref 42.7–76)
NRBC BLD AUTO-RTO: 0 /100 WBC (ref 0–0.2)
PLATELET # BLD AUTO: 325 10*3/MM3 (ref 140–450)
POTASSIUM SERPL-SCNC: 4.6 MMOL/L (ref 3.5–5.2)
PROT SERPL-MCNC: 7 G/DL (ref 6–8.5)
RBC # BLD AUTO: 4.63 10*6/MM3 (ref 4.14–5.8)
SODIUM SERPL-SCNC: 133 MMOL/L (ref 136–145)
T3RU NFR SERPL: 27 % (ref 24–39)
T4 SERPL-MCNC: 7.9 UG/DL (ref 4.5–12)
TRIGL SERPL-MCNC: 88 MG/DL (ref 0–150)
TSH SERPL DL<=0.005 MIU/L-ACNC: 4.56 UIU/ML (ref 0.45–4.5)
VIT B12 SERPL-MCNC: 726 PG/ML (ref 211–946)
VLDLC SERPL CALC-MCNC: 16 MG/DL (ref 5–40)
WBC # BLD AUTO: 5.67 10*3/MM3 (ref 3.4–10.8)

## 2024-07-07 RX ORDER — LEVOTHYROXINE SODIUM 137 MCG
137 TABLET ORAL DAILY
Qty: 90 TABLET | Refills: 1 | Status: SHIPPED | OUTPATIENT
Start: 2024-07-07 | End: 2024-07-08

## 2024-07-07 NOTE — PROGRESS NOTES
Please inform the patient of the following abnormal results. Need to increase synthroid 137mcg daily. Will discuss other labs at office visit.

## 2024-07-08 DIAGNOSIS — E03.9 HYPOTHYROIDISM, UNSPECIFIED TYPE: Primary | ICD-10-CM

## 2024-07-08 RX ORDER — LEVOTHYROXINE SODIUM 137 UG/1
137 TABLET ORAL DAILY
Qty: 90 TABLET | Refills: 3 | Status: SHIPPED | OUTPATIENT
Start: 2024-07-08

## 2024-07-15 ENCOUNTER — OFFICE VISIT (OUTPATIENT)
Dept: INTERNAL MEDICINE | Facility: CLINIC | Age: 69
End: 2024-07-15
Payer: MEDICARE

## 2024-07-15 VITALS
BODY MASS INDEX: 26.63 KG/M2 | DIASTOLIC BLOOD PRESSURE: 86 MMHG | RESPIRATION RATE: 14 BRPM | HEIGHT: 70 IN | WEIGHT: 186 LBS | HEART RATE: 67 BPM | SYSTOLIC BLOOD PRESSURE: 126 MMHG | OXYGEN SATURATION: 98 %

## 2024-07-15 DIAGNOSIS — E03.9 HYPOTHYROIDISM, UNSPECIFIED TYPE: Primary | ICD-10-CM

## 2024-07-15 DIAGNOSIS — I10 ESSENTIAL HYPERTENSION: ICD-10-CM

## 2024-07-15 DIAGNOSIS — E53.8 B12 DEFICIENCY: ICD-10-CM

## 2024-07-15 DIAGNOSIS — E87.1 HYPONATREMIA: ICD-10-CM

## 2024-07-15 PROCEDURE — 1159F MED LIST DOCD IN RCRD: CPT | Performed by: FAMILY MEDICINE

## 2024-07-15 PROCEDURE — 3074F SYST BP LT 130 MM HG: CPT | Performed by: FAMILY MEDICINE

## 2024-07-15 PROCEDURE — 3079F DIAST BP 80-89 MM HG: CPT | Performed by: FAMILY MEDICINE

## 2024-07-15 PROCEDURE — 99214 OFFICE O/P EST MOD 30 MIN: CPT | Performed by: FAMILY MEDICINE

## 2024-07-15 PROCEDURE — 1125F AMNT PAIN NOTED PAIN PRSNT: CPT | Performed by: FAMILY MEDICINE

## 2024-07-15 PROCEDURE — 1160F RVW MEDS BY RX/DR IN RCRD: CPT | Performed by: FAMILY MEDICINE

## 2024-07-15 PROCEDURE — 96372 THER/PROPH/DIAG INJ SC/IM: CPT | Performed by: FAMILY MEDICINE

## 2024-07-15 RX ORDER — NEBIVOLOL 5 MG/1
5 TABLET ORAL DAILY
Qty: 30 TABLET | Refills: 6 | Status: SHIPPED | OUTPATIENT
Start: 2024-07-15

## 2024-07-15 RX ORDER — SODIUM CHLORIDE 1 G/1
1 TABLET ORAL DAILY
Qty: 14 TABLET | Refills: 0 | Status: SHIPPED | OUTPATIENT
Start: 2024-07-15

## 2024-07-15 RX ORDER — CYANOCOBALAMIN 1000 UG/ML
1000 INJECTION, SOLUTION INTRAMUSCULAR; SUBCUTANEOUS
Status: SHIPPED | OUTPATIENT
Start: 2024-07-15

## 2024-07-15 RX ADMIN — CYANOCOBALAMIN 1000 MCG: 1000 INJECTION, SOLUTION INTRAMUSCULAR; SUBCUTANEOUS at 13:28

## 2024-07-15 NOTE — PROGRESS NOTES
"Chief Complaint  Hypertension    Subjective          Yohan Lawler presents to [facility]  History of Present Illness  The patient is a 68-year-old male who presents for evaluation of multiple medical concerns.    The patient recently underwent blood work. His thyroid medication dosage was adjusted from 125 mcg to 137 mcg, however, he reports no discernible difference in his condition. He expresses interest in receiving a B12 injection. His daily routine includes waking up early, taking care of flowers, and caring for his wife. He reports feeling fatigued, albeit not as young as he once was. He denies the use of alcohol, tobacco, or illicit drugs. He takes CBD nightly to aid sleep. He has discontinued the use of B12 injections and is currently on three antihypertensive medications.    Objective   Vital Signs:   /86 (BP Location: Left arm, Patient Position: Sitting, Cuff Size: Adult)   Pulse 67   Resp 14   Ht 177.8 cm (70\")   Wt 84.4 kg (186 lb)   SpO2 98%   BMI 26.69 kg/m²     Physical Exam  Vitals and nursing note reviewed.   Constitutional:       Appearance: He is well-developed.   HENT:      Head: Normocephalic and atraumatic.   Musculoskeletal:      Cervical back: Normal range of motion and neck supple.   Neurological:      Mental Status: He is alert and oriented to person, place, and time.   Psychiatric:         Behavior: Behavior normal.         Physical Exam  Vital Signs  Heart rate is 67.     Result Review :                 Assessment and Plan    Diagnoses and all orders for this visit:    1. Hypothyroidism, unspecified type (Primary)  -     Thyroid Panel With TSH; Future    2. B12 deficiency  -     Vitamin B12; Future  -     cyanocobalamin injection 1,000 mcg    3. Hyponatremia  -     sodium chloride 1 g tablet; Take 1 tablet by mouth Daily.  Dispense: 14 tablet; Refill: 0    4. Essential hypertension  -     nebivolol (BYSTOLIC) 5 MG tablet; Take 1 tablet by " mouth Daily.  Dispense: 30 tablet; Refill: 6  -     Comprehensive Metabolic Panel; Future      Assessment & Plan  1. Hypothyroidism.  The patient's TSH levels have consistently remained elevated, a decrease from the 150s to 100s. Notably, his weight has been fluctuating. Samples of 137 mcg thyroid medication were provided, and the patient was advised to try the branded medication. A thyroid function test will be conducted in 1 month. Will continue him on 137mcg.     2. Hyponatremia.  The patient's sodium levels have been consistently low, potentially contributing to his overall well-being. A prescription for sodium tablets was issued, to be taken daily for 2 weeks.    3. Hypertension.  The patient is currently on a regimen of losartan 100 mg. Hydrochlorothiazide was discontinued and replaced with nebivolol 5 mg.    Follow-up  A follow-up appointment is scheduled for 1 month from now.    Follow Up   No follow-ups on file.  Patient was given instructions and counseling regarding his condition or for health maintenance advice. Please see specific information pulled into the AVS if appropriate.           Patient or patient representative verbalized consent for the use of Ambient Listening during the visit with  Rusty Hickey MD for chart documentation. 7/15/2024  15:29 EDT

## 2024-08-06 DIAGNOSIS — J30.9 ALLERGIC RHINITIS, UNSPECIFIED SEASONALITY, UNSPECIFIED TRIGGER: ICD-10-CM

## 2024-08-06 RX ORDER — FLUTICASONE PROPIONATE 50 MCG
2 SPRAY, SUSPENSION (ML) NASAL DAILY
Qty: 18 ML | Refills: 2 | Status: SHIPPED | OUTPATIENT
Start: 2024-08-06

## 2024-08-06 RX ORDER — PREDNISOLONE ACETATE 10 MG/ML
1 SUSPENSION/ DROPS OPHTHALMIC DAILY
Qty: 5 ML | Refills: 1 | Status: SHIPPED | OUTPATIENT
Start: 2024-08-06

## 2024-08-06 NOTE — TELEPHONE ENCOUNTER
Rx Refill Note  Requested Prescriptions     Pending Prescriptions Disp Refills    prednisoLONE acetate (PRED FORTE) 1 % ophthalmic suspension 5 mL 1     Sig: Administer 1 drop into the left eye Daily.    fluticasone (Flonase) 50 MCG/ACT nasal spray 18 mL 2     Si sprays into the nostril(s) as directed by provider Daily.      Last office visit with prescribing clinician: 7/15/2024   Last telemedicine visit with prescribing clinician: Visit date not found   Next office visit with prescribing clinician: 2024                         Would you like a call back once the refill request has been completed: [] Yes [] No    If the office needs to give you a call back, can they leave a voicemail: [] Yes [] No    Elisha Ackerman MA  24, 14:24 EDT

## 2024-08-10 DIAGNOSIS — I10 ESSENTIAL HYPERTENSION: ICD-10-CM

## 2024-08-12 RX ORDER — AMLODIPINE BESYLATE 5 MG/1
5 TABLET ORAL DAILY
Qty: 90 TABLET | Refills: 3 | Status: SHIPPED | OUTPATIENT
Start: 2024-08-12

## 2024-08-14 ENCOUNTER — PATIENT MESSAGE (OUTPATIENT)
Dept: INTERNAL MEDICINE | Facility: CLINIC | Age: 69
End: 2024-08-14
Payer: MEDICARE

## 2024-08-14 DIAGNOSIS — E03.9 HYPOTHYROIDISM, UNSPECIFIED TYPE: ICD-10-CM

## 2024-08-14 RX ORDER — LEVOTHYROXINE SODIUM 137 UG/1
137 TABLET ORAL DAILY
Qty: 90 TABLET | Refills: 3 | Status: SHIPPED | OUTPATIENT
Start: 2024-08-14

## 2024-08-14 NOTE — TELEPHONE ENCOUNTER
From: Yohan Lawler  To: Rusty Hickey  Sent: 8/14/2024 11:02 AM EDT  Subject: Thyroid Medication    Thank You for the samples of the Thyroid Medication you recommended .The test shows that they have helped .Please put a prescription in for that item at my Pharmacy?    Thank You    Norwendy

## 2024-08-20 ENCOUNTER — OFFICE VISIT (OUTPATIENT)
Dept: INTERNAL MEDICINE | Facility: CLINIC | Age: 69
End: 2024-08-20
Payer: MEDICARE

## 2024-08-20 VITALS
DIASTOLIC BLOOD PRESSURE: 60 MMHG | HEART RATE: 69 BPM | SYSTOLIC BLOOD PRESSURE: 130 MMHG | HEIGHT: 70 IN | WEIGHT: 199 LBS | BODY MASS INDEX: 28.49 KG/M2 | RESPIRATION RATE: 17 BRPM | OXYGEN SATURATION: 100 % | TEMPERATURE: 98.2 F

## 2024-08-20 DIAGNOSIS — E03.9 HYPOTHYROIDISM, UNSPECIFIED TYPE: Primary | ICD-10-CM

## 2024-08-20 PROCEDURE — 99214 OFFICE O/P EST MOD 30 MIN: CPT | Performed by: FAMILY MEDICINE

## 2024-08-20 PROCEDURE — 1159F MED LIST DOCD IN RCRD: CPT | Performed by: FAMILY MEDICINE

## 2024-08-20 PROCEDURE — 3075F SYST BP GE 130 - 139MM HG: CPT | Performed by: FAMILY MEDICINE

## 2024-08-20 PROCEDURE — 1160F RVW MEDS BY RX/DR IN RCRD: CPT | Performed by: FAMILY MEDICINE

## 2024-08-20 PROCEDURE — 3078F DIAST BP <80 MM HG: CPT | Performed by: FAMILY MEDICINE

## 2024-08-20 PROCEDURE — 1126F AMNT PAIN NOTED NONE PRSNT: CPT | Performed by: FAMILY MEDICINE

## 2024-08-20 PROCEDURE — G2211 COMPLEX E/M VISIT ADD ON: HCPCS | Performed by: FAMILY MEDICINE

## 2024-08-20 RX ORDER — LEVOTHYROXINE SODIUM 137 MCG
137 TABLET ORAL DAILY
Qty: 90 TABLET | Refills: 3 | Status: SHIPPED | OUTPATIENT
Start: 2024-08-20

## 2024-08-24 DIAGNOSIS — K21.9 GASTROESOPHAGEAL REFLUX DISEASE WITHOUT ESOPHAGITIS: ICD-10-CM

## 2024-08-24 NOTE — PROGRESS NOTES
"Chief Complaint  Follow-up    Subjective          Yohan Lawler presents to [facility]  History of Present Illness  The patient is a 69-year-old male who presents for evaluation of multiple medical concerns.    He reports that his recent blood work, which included thyroid function tests, showed normal results. He has been taking levothyroxine 137 mcg daily and noticed a difference when he had to switch back to the generic version after running out of the sample provided.    Essential hypertension.  Today blood pressure is 130/60.  Patient is currently taking Bystolic 5 mg daily.  He is also on amlodipine 5 mg daily.  As well as taking losartan 100 mg daily.  Patient denies any side effects of the medication.    Objective   Vital Signs:   /60 (BP Location: Right arm, Patient Position: Sitting, Cuff Size: Adult)   Pulse 69   Temp 98.2 °F (36.8 °C) (Skin)   Resp 17   Ht 177.8 cm (70\")   Wt 90.3 kg (199 lb)   SpO2 100%   BMI 28.55 kg/m²     Physical Exam  Vitals and nursing note reviewed.   Constitutional:       Appearance: He is well-developed.   HENT:      Head: Normocephalic and atraumatic.   Musculoskeletal:      Cervical back: Normal range of motion and neck supple.   Neurological:      Mental Status: He is alert and oriented to person, place, and time.   Psychiatric:         Behavior: Behavior normal.         Physical Exam  Vital Signs  Blood pressure reading is 130/60.     Result Review :     Common labs          4/5/2024    10:11 7/2/2024    08:36 8/12/2024    09:54   Common Labs   Glucose 92  100  99    BUN 8  5  8    Creatinine 0.85  0.78  0.84    Sodium 133  133  136    Potassium 4.0  4.6  3.8    Chloride 95  94  94    Calcium 9.4  9.8  9.7    Total Protein 7.5  7.0  7.3    Albumin 4.6  4.4  4.5    Total Bilirubin 0.7  0.6  0.7    Alkaline Phosphatase 76  71  71    AST (SGOT) 27  29  29    ALT (SGPT) 35  41  42    WBC 6.16  5.67     Hemoglobin 14.7  14.5   "   Hematocrit 42.4  42.1     Platelets 390  325     Total Cholesterol 163  166     Triglycerides 99  88     HDL Cholesterol 45  52     LDL Cholesterol  100  98     Hemoglobin A1C 5.50  5.30                 Assessment and Plan    Diagnoses and all orders for this visit:    1. Hypothyroidism, unspecified type (Primary)  -     Synthroid 137 MCG tablet; Take 1 tablet by mouth Daily.  Dispense: 90 tablet; Refill: 3      Assessment & Plan  1. Hypothyroidism.  A transition to branded Synthroid 137 mcg daily was recommended due to the patient's better response compared to the generic levothyroxine. Samples were provided for trial. A prescription for a 90-day supply with refills will be sent to [facility]. If there are issues with insurance coverage, he may opt to get it from [facility] in [place] for a cash price.    2. Essential hypertension.  He is currently on a regimen of losartan 100 mg daily, amlodipine 5 mg daily, and Bystolic 5 mg daily, all of which are well-tolerated. His blood pressure is stable at 130/60 mmHg. The current medication regimen will be maintained.    3. Elevated ALT.  His ALT was slightly elevated by one point. This will be rechecked at the next visit in 3 months. No immediate intervention is required as the elevation is minimal.    Follow-up  The patient will follow up in 3 months.    Follow Up   No follow-ups on file.  Patient was given instructions and counseling regarding his condition or for health maintenance advice. Please see specific information pulled into the AVS if appropriate.           Patient or patient representative verbalized consent for the use of Ambient Listening during the visit with  Rusty Hickey MD for chart documentation. 8/24/2024  15:21 EDT

## 2024-08-26 RX ORDER — OMEPRAZOLE 40 MG/1
40 CAPSULE, DELAYED RELEASE ORAL DAILY
Qty: 90 CAPSULE | Refills: 2 | Status: SHIPPED | OUTPATIENT
Start: 2024-08-26

## 2024-09-13 DIAGNOSIS — J30.9 ALLERGIC RHINITIS, UNSPECIFIED SEASONALITY, UNSPECIFIED TRIGGER: ICD-10-CM

## 2024-09-13 RX ORDER — FLUTICASONE PROPIONATE 50 MCG
2 SPRAY, SUSPENSION (ML) NASAL DAILY
Qty: 18 G | Refills: 2 | Status: SHIPPED | OUTPATIENT
Start: 2024-09-13

## 2024-09-27 DIAGNOSIS — E03.9 HYPOTHYROIDISM, UNSPECIFIED TYPE: ICD-10-CM

## 2024-09-27 RX ORDER — LEVOTHYROXINE SODIUM 150 UG/1
TABLET ORAL
Qty: 90 TABLET | Refills: 0 | OUTPATIENT
Start: 2024-09-27

## 2024-09-30 RX ORDER — PREDNISOLONE ACETATE 10 MG/ML
1 SUSPENSION/ DROPS OPHTHALMIC DAILY
Qty: 5 ML | Refills: 1 | Status: SHIPPED | OUTPATIENT
Start: 2024-09-30

## 2024-10-14 ENCOUNTER — ANESTHESIA EVENT (OUTPATIENT)
Dept: PAIN MEDICINE | Facility: HOSPITAL | Age: 69
End: 2024-10-14
Payer: MEDICARE

## 2024-10-15 ENCOUNTER — ANESTHESIA (OUTPATIENT)
Dept: PAIN MEDICINE | Facility: HOSPITAL | Age: 69
End: 2024-10-15
Payer: MEDICARE

## 2024-10-15 ENCOUNTER — HOSPITAL ENCOUNTER (OUTPATIENT)
Dept: GENERAL RADIOLOGY | Facility: HOSPITAL | Age: 69
Discharge: HOME | End: 2024-10-15
Payer: MEDICARE

## 2024-10-15 ENCOUNTER — HOSPITAL ENCOUNTER (OUTPATIENT)
Dept: PAIN MEDICINE | Facility: HOSPITAL | Age: 69
Discharge: HOME | End: 2024-10-15
Payer: MEDICARE

## 2024-10-15 VITALS
SYSTOLIC BLOOD PRESSURE: 129 MMHG | HEART RATE: 80 BPM | TEMPERATURE: 97.8 F | RESPIRATION RATE: 14 BRPM | DIASTOLIC BLOOD PRESSURE: 86 MMHG | OXYGEN SATURATION: 95 %

## 2024-10-15 DIAGNOSIS — R52 PAIN: ICD-10-CM

## 2024-10-15 DIAGNOSIS — M54.16 LUMBAR RADICULOPATHY: Primary | ICD-10-CM

## 2024-10-15 PROCEDURE — 77003 FLUOROGUIDE FOR SPINE INJECT: CPT

## 2024-10-15 PROCEDURE — 25510000001 IOPAMIDOL 41 % SOLUTION: Performed by: ANESTHESIOLOGY

## 2024-10-15 PROCEDURE — 25010000002 METHYLPREDNISOLONE PER 80 MG: Performed by: ANESTHESIOLOGY

## 2024-10-15 RX ORDER — IOPAMIDOL 408 MG/ML
3 INJECTION, SOLUTION INTRATHECAL
Status: COMPLETED | OUTPATIENT
Start: 2024-10-15 | End: 2024-10-15

## 2024-10-15 RX ORDER — FENTANYL CITRATE 50 UG/ML
50 INJECTION, SOLUTION INTRAMUSCULAR; INTRAVENOUS AS NEEDED
Status: DISCONTINUED | OUTPATIENT
Start: 2024-10-15 | End: 2024-10-16 | Stop reason: HOSPADM

## 2024-10-15 RX ORDER — METHYLPREDNISOLONE ACETATE 80 MG/ML
80 INJECTION, SUSPENSION INTRA-ARTICULAR; INTRALESIONAL; INTRAMUSCULAR; SOFT TISSUE ONCE
Status: COMPLETED | OUTPATIENT
Start: 2024-10-15 | End: 2024-10-15

## 2024-10-15 RX ORDER — LIDOCAINE HYDROCHLORIDE 10 MG/ML
1 INJECTION, SOLUTION INFILTRATION; PERINEURAL ONCE
Status: DISCONTINUED | OUTPATIENT
Start: 2024-10-15 | End: 2024-10-16 | Stop reason: HOSPADM

## 2024-10-15 RX ORDER — MIDAZOLAM HYDROCHLORIDE 1 MG/ML
1 INJECTION INTRAMUSCULAR; INTRAVENOUS
Status: DISCONTINUED | OUTPATIENT
Start: 2024-10-15 | End: 2024-10-16 | Stop reason: HOSPADM

## 2024-10-15 RX ADMIN — IOPAMIDOL 10 ML: 408 INJECTION, SOLUTION INTRATHECAL at 07:45

## 2024-10-15 RX ADMIN — METHYLPREDNISOLONE ACETATE 80 MG: 80 INJECTION, SUSPENSION INTRA-ARTICULAR; INTRALESIONAL; INTRAMUSCULAR; SOFT TISSUE at 07:45

## 2024-10-15 NOTE — ANESTHESIA PROCEDURE NOTES
PAIN Epidural block    Pre-sedation assessment completed: 10/15/2024 7:41 AM    Patient reassessed immediately prior to procedure    Patient location during procedure: pain clinic  Start Time: 10/15/2024 7:41 AM  Stop Time: 10/15/2024 7:50 AM  Indication:at surgeon's request and procedure for pain  Performed By  Anesthesiologist: Shahram Berger MD  Preanesthetic Checklist  Completed: patient identified, IV checked, site marked, risks and benefits discussed, surgical consent, monitors and equipment checked, pre-op evaluation and timeout performed  Additional Notes  Dx:  Post-Op Diagnosis Codes:     * Lumbar radiculopathy [M54.16]            Plan : return to clinic as needed      Sedation start:                                                  End:  Prep:  Pt Position:prone (prone)  Sterile Tech:cap, gloves, mask and sterile barrier  Prep:chlorhexidine gluconate and isopropyl alcohol  Monitoring:blood pressure monitoring, EKG and continuous pulse oximetry  Procedure:Sedation: no     Approach:midline  Guidance: fluoroscopy and c arm pa and lat and loss of resistance  Location:lumbar  Level:L5-S1 (interlaminar)  Needle Type:Bizmoreradha  Needle Gauge:20  Aspiration:negative  Medications:  Preservative Free Saline:3mL  Isovue:1mL  Comments:80 mg depomedrol in epidDepomedrol:80 mg  Post Assessment:  Pt Tolerance:patient tolerated the procedure well with no apparent complications  Complications:no

## 2024-10-15 NOTE — DISCHARGE INSTRUCTIONS

## 2024-10-15 NOTE — H&P
INTERVAL HISTORY:    The patient returns for another Lumbar epidural steroid injection 3 today.  They have received 75% improvement since their last injection with a pain level of 8/10 at its worst recently.    Conservative measures tried in the interim. Daily activities are still affected by the pain.    Radiology reports and/or previous notes have been reviewed and are consistent with their diagnosis of Post-Op Diagnosis Codes:     * Lumbar radiculopathy [M54.16]    Alert and oriented  MP - 2  Lungs - clear  CV - rrr    Diagnosis:  Post-Op Diagnosis Codes:     * Lumbar radiculopathy [M54.16]      Plan:  Lumbar epidural steroid injection under fluoroscopic guidance        Target : L5S1    I have encouraged them to continue:  1.  Physical therapy exercises at home as prescribed by physical therapy or from the pain clinic handout (given to the patient).  Continuation of these exercises every day, or multiple times per week, even when the patient has good pain relief, was stressed to the patient as a preventative measure to decrease the frequency and severity of future pain episodes.  2.  Continue pain medicines as already prescribed.  If patient not currently taking any, it is recommended to begin Acetaminophen 1000 mg po q 8 hours.  If other medicines containing Acetaminophen are currently prescribed, maintain daily dose at 3000mg.    3.  If they can tolerate NSAIDS, it is recommended to take Ibuprofen 600 mg po q 6 hours for 7 days during pain exacerbations.   Alternatively, they may substitute an NSAID of their choice (e.g. Aleve)  4.  Heat and ice to the affected area as tolerated for pain control.  It was discussed that heating pads can cause burns.  5.  Low impact exercise such as walking or water exercise was recommended to maintain overall health and aid in weight control.   6.  Follow up as needed for subsequent injections.  7.  Patient was counseled to abstain from tobacco products.    Time : 17   min

## 2024-11-04 DIAGNOSIS — J30.9 ALLERGIC RHINITIS, UNSPECIFIED SEASONALITY, UNSPECIFIED TRIGGER: ICD-10-CM

## 2024-11-04 DIAGNOSIS — I10 ESSENTIAL HYPERTENSION: ICD-10-CM

## 2024-11-04 RX ORDER — FLUTICASONE PROPIONATE 50 MCG
2 SPRAY, SUSPENSION (ML) NASAL DAILY
Qty: 18 G | Refills: 2 | Status: SHIPPED | OUTPATIENT
Start: 2024-11-04

## 2024-11-04 RX ORDER — AMLODIPINE BESYLATE 5 MG/1
5 TABLET ORAL DAILY
Qty: 90 TABLET | Refills: 3 | Status: SHIPPED | OUTPATIENT
Start: 2024-11-04

## 2024-11-15 DIAGNOSIS — K21.9 GASTROESOPHAGEAL REFLUX DISEASE WITHOUT ESOPHAGITIS: ICD-10-CM

## 2024-11-15 RX ORDER — OMEPRAZOLE 40 MG/1
40 CAPSULE, DELAYED RELEASE ORAL DAILY
Qty: 90 CAPSULE | Refills: 2 | Status: SHIPPED | OUTPATIENT
Start: 2024-11-15

## 2024-11-21 ENCOUNTER — OFFICE VISIT (OUTPATIENT)
Dept: INTERNAL MEDICINE | Facility: CLINIC | Age: 69
End: 2024-11-21
Payer: MEDICARE

## 2024-11-21 VITALS
OXYGEN SATURATION: 99 % | HEART RATE: 77 BPM | WEIGHT: 199 LBS | DIASTOLIC BLOOD PRESSURE: 82 MMHG | HEIGHT: 70 IN | BODY MASS INDEX: 28.49 KG/M2 | SYSTOLIC BLOOD PRESSURE: 128 MMHG | TEMPERATURE: 97.8 F | RESPIRATION RATE: 17 BRPM

## 2024-11-21 DIAGNOSIS — J01.00 ACUTE NON-RECURRENT MAXILLARY SINUSITIS: ICD-10-CM

## 2024-11-21 DIAGNOSIS — E53.8 B12 DEFICIENCY: ICD-10-CM

## 2024-11-21 DIAGNOSIS — Z23 FLU VACCINE NEED: ICD-10-CM

## 2024-11-21 DIAGNOSIS — R73.03 PREDIABETES: ICD-10-CM

## 2024-11-21 DIAGNOSIS — E53.8 VITAMIN B12 DEFICIENCY: Primary | ICD-10-CM

## 2024-11-21 DIAGNOSIS — E78.5 DYSLIPIDEMIA: ICD-10-CM

## 2024-11-21 DIAGNOSIS — I10 ESSENTIAL HYPERTENSION: ICD-10-CM

## 2024-11-21 DIAGNOSIS — E03.9 HYPOTHYROIDISM, UNSPECIFIED TYPE: ICD-10-CM

## 2024-11-21 DIAGNOSIS — Z12.11 ENCOUNTER FOR SCREENING COLONOSCOPY: ICD-10-CM

## 2024-11-21 RX ORDER — AMOXICILLIN 500 MG/1
500 CAPSULE ORAL 2 TIMES DAILY
Qty: 14 CAPSULE | Refills: 0 | Status: SHIPPED | OUTPATIENT
Start: 2024-11-21 | End: 2024-11-28

## 2024-11-21 RX ORDER — CYANOCOBALAMIN 1000 UG/ML
1000 INJECTION, SOLUTION INTRAMUSCULAR; SUBCUTANEOUS
Status: SHIPPED | OUTPATIENT
Start: 2024-11-21

## 2024-11-21 RX ORDER — HYDROCHLOROTHIAZIDE 25 MG/1
25 TABLET ORAL DAILY
COMMUNITY
Start: 2024-11-19

## 2024-11-21 RX ADMIN — CYANOCOBALAMIN 1000 MCG: 1000 INJECTION, SOLUTION INTRAMUSCULAR; SUBCUTANEOUS at 12:31

## 2024-11-21 NOTE — PROGRESS NOTES
"Chief Complaint  Hypothyroidism (3 month F/U ), Dizziness, and Sinus Problem    Subjective          Yohan Lawler presents to [facility] PRIMARY [facility]ARE  History of Present Illness  The patient is a 69-year-old male who presents for evaluation of multiple medical concerns.    He is due for his annual influenza vaccine and B12 injection and has requested blood work. He also mentions that he is scheduled for a colonoscopy in 02/2025.    He has been experiencing dizziness upon standing, which he attributes to his high coffee intake, despite drinking approximately 8 glasses of water daily. He is compliant with all his prescribed medications, except for the salt tablet.    He reports a sinus infection, which was previously treated with antibiotics.    He expresses concern about his health due to his wife's condition, as he is her primary caregiver.    Objective   Vital Signs:   /82 (BP Location: Right arm, Patient Position: Sitting, Cuff Size: Adult)   Pulse 77   Temp 97.8 °F (36.6 °C) (Oral)   Resp 17   Ht 177.8 cm (70\")   Wt 90.3 kg (199 lb)   SpO2 99%   BMI 28.55 kg/m²     Physical Exam  Vitals and nursing note reviewed.   Constitutional:       Appearance: He is well-developed.   HENT:      Head: Normocephalic and atraumatic.   Musculoskeletal:      Cervical back: Normal range of motion and neck supple.   Neurological:      Mental Status: He is alert and oriented to person, place, and time.   Psychiatric:         Behavior: Behavior normal.         Physical Exam  Vital Signs  Blood pressure reading today is 128/82.     Result Review :                 Assessment and Plan    Diagnoses and all orders for this visit:    1. Vitamin B12 deficiency (Primary)  -     cyanocobalamin injection 1,000 mcg  -     Vitamin B12  -     Methylmalonic Acid, Serum    2. Essential hypertension  -     CBC & Differential  -     Comprehensive Metabolic Panel    3. Hypothyroidism, unspecified type  -     Thyroid " Panel With TSH    4. B12 deficiency  -     Vitamin B12    5. Prediabetes  -     Hemoglobin A1c    6. Encounter for screening colonoscopy  -     Ambulatory Referral For Screening Colonoscopy    7. Flu vaccine need  -     Fluzone High-Dose 65+yrs ([id number])    8. Dyslipidemia  -     Lipid Panel With LDL / HDL Ratio    9. Acute non-recurrent maxillary sinusitis  -     amoxicillin (AMOXIL) 500 MG capsule; Take 1 capsule by mouth 2 (Two) Times a Day for 7 days.  Dispense: 14 capsule; Refill: 0      Assessment & Plan  1. Essential Hypertension.  He is taking hydrochlorothiazide 25 mg daily, Bystolic 5 mg daily, amlodipine 5 mg daily, and losartan 100 mg daily. There are no reported side effects. Blood pressure today is 128/82.    2. Hypothyroidism.  He is on Synthroid 137 mcg daily with no reported side effects. He reports feeling good overall and is anxious to see if the thyroid medication is effective.    3. Vitamin B12 Deficiency.  He has been taking 1000 mcg of vitamin B12 monthly. A B12 test and blood work will be conducted today.    4. Sinus Infection.  Amoxicillin will be prescribed for the sinus infection. [facility]    5. Dizziness.  He reports feeling dizzy upon standing, which could be related to inadequate water intake. He is advised to reduce coffee consumption to one glass per day and increase water intake until his urine is clear.    6. Health Maintenance.  He received a flu shot today. A colonoscopy is to be scheduled after the first of the year. A prostate exam is planned for February 2025.        Follow Up   No follow-ups on file.  Patient was given instructions and counseling regarding his condition or for health maintenance advice. Please see specific information pulled into the AVS if appropriate.           Patient or patient representative verbalized consent for the use of Ambient Listening during the visit with  Rusty Hickey MD for chart documentation. 11/21/2024   10:04 EST

## 2024-11-22 ENCOUNTER — PREP FOR SURGERY (OUTPATIENT)
Dept: SURGERY | Facility: SURGERY CENTER | Age: 69
End: 2024-11-22
Payer: MEDICARE

## 2024-11-22 DIAGNOSIS — Z86.0100 HISTORY OF COLONIC POLYPS: ICD-10-CM

## 2024-11-22 DIAGNOSIS — Z12.11 ENCOUNTER FOR SCREENING FOR MALIGNANT NEOPLASM OF COLON: Primary | ICD-10-CM

## 2024-11-22 RX ORDER — SODIUM CHLORIDE, SODIUM LACTATE, POTASSIUM CHLORIDE, CALCIUM CHLORIDE 600; 310; 30; 20 MG/100ML; MG/100ML; MG/100ML; MG/100ML
30 INJECTION, SOLUTION INTRAVENOUS CONTINUOUS PRN
OUTPATIENT
Start: 2024-11-22 | End: 2024-11-22

## 2024-11-22 RX ORDER — SODIUM CHLORIDE 0.9 % (FLUSH) 0.9 %
10 SYRINGE (ML) INJECTION AS NEEDED
OUTPATIENT
Start: 2024-11-22

## 2024-11-22 RX ORDER — SODIUM CHLORIDE 0.9 % (FLUSH) 0.9 %
3 SYRINGE (ML) INJECTION EVERY 12 HOURS SCHEDULED
OUTPATIENT
Start: 2024-11-22

## 2024-11-25 LAB
ALBUMIN SERPL-MCNC: 4.5 G/DL (ref 3.5–5.2)
ALBUMIN/GLOB SERPL: 1.6 G/DL
ALP SERPL-CCNC: 80 U/L (ref 39–117)
ALT SERPL-CCNC: 43 U/L (ref 1–41)
AST SERPL-CCNC: 33 U/L (ref 1–40)
BASOPHILS # BLD AUTO: 0.03 10*3/MM3 (ref 0–0.2)
BASOPHILS NFR BLD AUTO: 0.4 % (ref 0–1.5)
BILIRUB SERPL-MCNC: 0.7 MG/DL (ref 0–1.2)
BUN SERPL-MCNC: 6 MG/DL (ref 8–23)
BUN/CREAT SERPL: 8.3 (ref 7–25)
CALCIUM SERPL-MCNC: 9.8 MG/DL (ref 8.6–10.5)
CHLORIDE SERPL-SCNC: 94 MMOL/L (ref 98–107)
CHOLEST SERPL-MCNC: 158 MG/DL (ref 0–200)
CO2 SERPL-SCNC: 27.9 MMOL/L (ref 22–29)
CREAT SERPL-MCNC: 0.72 MG/DL (ref 0.76–1.27)
EGFRCR SERPLBLD CKD-EPI 2021: 97.7 ML/MIN/1.73
EOSINOPHIL # BLD AUTO: 0.45 10*3/MM3 (ref 0–0.4)
EOSINOPHIL NFR BLD AUTO: 6.5 % (ref 0.3–6.2)
ERYTHROCYTE [DISTWIDTH] IN BLOOD BY AUTOMATED COUNT: 12 % (ref 12.3–15.4)
FT4I SERPL CALC-MCNC: 2.4 (ref 1.2–4.9)
GLOBULIN SER CALC-MCNC: 2.9 GM/DL
GLUCOSE SERPL-MCNC: 97 MG/DL (ref 65–99)
HBA1C MFR BLD: 5.4 % (ref 4.8–5.6)
HCT VFR BLD AUTO: 44.3 % (ref 37.5–51)
HDLC SERPL-MCNC: 36 MG/DL (ref 40–60)
HGB BLD-MCNC: 15.6 G/DL (ref 13–17.7)
IMM GRANULOCYTES # BLD AUTO: 0.02 10*3/MM3 (ref 0–0.05)
IMM GRANULOCYTES NFR BLD AUTO: 0.3 % (ref 0–0.5)
LDLC SERPL CALC-MCNC: 97 MG/DL (ref 0–100)
LDLC/HDLC SERPL: 2.61 {RATIO}
LYMPHOCYTES # BLD AUTO: 1.96 10*3/MM3 (ref 0.7–3.1)
LYMPHOCYTES NFR BLD AUTO: 28.4 % (ref 19.6–45.3)
MCH RBC QN AUTO: 32.8 PG (ref 26.6–33)
MCHC RBC AUTO-ENTMCNC: 35.2 G/DL (ref 31.5–35.7)
MCV RBC AUTO: 93.3 FL (ref 79–97)
METHYLMALONATE SERPL-SCNC: 104 NMOL/L (ref 0–378)
MONOCYTES # BLD AUTO: 0.5 10*3/MM3 (ref 0.1–0.9)
MONOCYTES NFR BLD AUTO: 7.3 % (ref 5–12)
NEUTROPHILS # BLD AUTO: 3.93 10*3/MM3 (ref 1.7–7)
NEUTROPHILS NFR BLD AUTO: 57.1 % (ref 42.7–76)
NRBC BLD AUTO-RTO: 0 /100 WBC (ref 0–0.2)
PLATELET # BLD AUTO: 427 10*3/MM3 (ref 140–450)
POTASSIUM SERPL-SCNC: 4.3 MMOL/L (ref 3.5–5.2)
PROT SERPL-MCNC: 7.4 G/DL (ref 6–8.5)
RBC # BLD AUTO: 4.75 10*6/MM3 (ref 4.14–5.8)
SODIUM SERPL-SCNC: 133 MMOL/L (ref 136–145)
T3RU NFR SERPL: 25 % (ref 24–39)
T4 SERPL-MCNC: 9.7 UG/DL (ref 4.5–12)
TRIGL SERPL-MCNC: 141 MG/DL (ref 0–150)
TSH SERPL DL<=0.005 MIU/L-ACNC: 0.81 UIU/ML (ref 0.45–4.5)
VIT B12 SERPL-MCNC: >2000 PG/ML (ref 211–946)
VLDLC SERPL CALC-MCNC: 25 MG/DL (ref 5–40)
WBC # BLD AUTO: 6.89 10*3/MM3 (ref 3.4–10.8)

## 2024-12-12 ENCOUNTER — OFFICE VISIT (OUTPATIENT)
Age: 69
End: 2024-12-12
Payer: MEDICARE

## 2024-12-12 VITALS
DIASTOLIC BLOOD PRESSURE: 84 MMHG | BODY MASS INDEX: 28.43 KG/M2 | SYSTOLIC BLOOD PRESSURE: 138 MMHG | OXYGEN SATURATION: 97 % | HEART RATE: 80 BPM | HEIGHT: 70 IN | WEIGHT: 198.6 LBS

## 2024-12-12 DIAGNOSIS — I10 ESSENTIAL HYPERTENSION: Primary | ICD-10-CM

## 2024-12-12 PROCEDURE — 3075F SYST BP GE 130 - 139MM HG: CPT | Performed by: INTERNAL MEDICINE

## 2024-12-12 PROCEDURE — 3079F DIAST BP 80-89 MM HG: CPT | Performed by: INTERNAL MEDICINE

## 2024-12-12 PROCEDURE — 99204 OFFICE O/P NEW MOD 45 MIN: CPT | Performed by: INTERNAL MEDICINE

## 2024-12-12 PROCEDURE — 93000 ELECTROCARDIOGRAM COMPLETE: CPT | Performed by: INTERNAL MEDICINE

## 2024-12-12 NOTE — PROGRESS NOTES
Subjective:     Encounter Date:12/12/24      Patient ID: Yohan Lawler is a 69 y.o. male.    Chief Complaint:  History of Present Illness    Dear Dr. Hickey,    I had the pleasure of seeing this patient in the office today for initial evaluation and consultation.  He self-referred himself to be evaluated.  They come in today to be seen for concern about possible occult cardiac issues.    Patient says recently he was working really hard in the yard and he felt a pounding sound in his ears.  He stopped and pretty quickly went away.  He did not have any chest pain or chest discomfort.  No shortness of breath.  He typically is been very active, used to do an hour every day on [employer], he is cut that back to about 30 minutes.  He also used to do weight and has cut that back.  He talked to his sister about this and she told him that it would be best to get his heart checked out.    No palpitations or tachycardia.  No presyncope or syncope.  No orthopnea or PND.  Recently had blood work, reviewed below, showed good control of his hyperlipidemia.    The following portions of the patient's history were reviewed and updated as appropriate: allergies, current medications, past family history, past medical history, past social history, past surgical history and problem list.      ECG 12 Lead    Date/Time: 12/12/2024 10:20 AM  Performed by: Christiano Abraham III, MD    Authorized by: Christiano Abraham III, MD  Comparison: compared with previous ECG   Similar to previous ECG  Rhythm: sinus rhythm  Rate: normal  Conduction: right bundle branch block and left anterior fascicular block  ST Segments: ST segments normal  T Waves: T waves normal  QRS axis: left  Other: no other findings    Clinical impression: non-specific ECG             Objective:     Vitals:    12/12/24 0927 12/12/24 1013   BP: 154/84 138/84   BP Location: Left arm    Patient Position: Sitting    Cuff Size: Adult    Pulse: 80    SpO2: 97%    Weight: 90.1 kg  Verified Results  prc HEMOCCULT SCREENING, IN-OFFICE [id number] 02:30PM BERNADETTE THOMPSON   [Aug 20, 2018 4:46PM BERNADETTE THOMPSON]  nl. repeat in 1 year.     Test Name Result Flag Reference   STOOL OCCULT BLOOD Negative          "(198 lb 9.6 oz)    Height: 177.8 cm (70\")      Body mass index is 28.5 kg/m².      Vitals reviewed.   Constitutional:       General: Not in acute distress.     Appearance: Well-developed. Not diaphoretic.   Eyes:      General:         Right eye: No discharge.         Left eye: No discharge.      Conjunctiva/sclera: Conjunctivae normal.   HENT:      Head: Normocephalic and atraumatic.      Nose: Nose normal.   Neck:      Thyroid: No thyromegaly.      Trachea: No tracheal deviation.   Pulmonary:      Effort: Pulmonary effort is normal. No respiratory distress.      Breath sounds: Normal breath sounds. No stridor.   Chest:      Chest wall: Not tender to palpatation.   Cardiovascular:      Normal rate. Regular rhythm.      Murmurs: There is no murmur.      . No S3 gallop. No click. No rub.   Pulses:     Intact distal pulses.   Edema:     Peripheral edema absent.   Abdominal:      General: Bowel sounds are normal. There is no distension.      Palpations: Abdomen is soft. There is no abdominal mass.   Musculoskeletal: Normal range of motion.         General: No tenderness or deformity.      Cervical back: Normal range of motion and neck supple. Skin:     General: Skin is warm and dry.      Findings: No erythema or rash.   Neurological:      Mental Status: Alert.   Psychiatric:         Attention and Perception: Attention normal.         Data and records reviewed:     Lab Results   Component Value Date    GLUCOSE 97 11/21/2024    BUN 6 (L) 11/21/2024    CREATININE 0.72 (L) 11/21/2024     (L) 11/21/2024    K 4.3 11/21/2024    CL 94 (L) 11/21/2024    CALCIUM 9.8 11/21/2024    PROTEINTOT 7.4 03/15/2022    ALBUMIN 4.5 11/21/2024    ALT 43 (H) 11/21/2024    AST 33 11/21/2024    ALKPHOS 80 11/21/2024    BILITOT 0.7 11/21/2024    GLOB 2.7 03/15/2022    AGRATIO 1.7 03/15/2022    BCR 8.3 11/21/2024    ANIONGAP 11.0 03/15/2022    EGFR 93.3 03/15/2022     Lab Results   Component Value Date    CHOL 159 03/15/2022    CHOL 157 " 05/18/2021    CHOL 175 11/09/2020     Lab Results   Component Value Date    TRIG 141 11/21/2024    TRIG 88 07/02/2024    TRIG 99 04/05/2024     Lab Results   Component Value Date    HDL 36 (L) 11/21/2024    HDL 52 07/02/2024    HDL 45 04/05/2024     Lab Results   Component Value Date    LDL 97 11/21/2024    LDL 98 07/02/2024     04/05/2024     Lab Results   Component Value Date    VLDL 25 11/21/2024    VLDL 16 07/02/2024    VLDL 18 04/05/2024     Lab Results   Component Value Date    LDLHDL 2.61 11/21/2024    LDLHDL 1.85 07/02/2024    LDLHDL 2.18 04/05/2024     CBC          4/5/2024    10:11 7/2/2024    08:36 11/21/2024    08:25   CBC   WBC 6.16  5.67  6.89    RBC 4.66  4.63  4.75    Hemoglobin 14.7  14.5  15.6    Hematocrit 42.4  42.1  44.3    MCV 91.0  90.9  93.3    MCH 31.5  31.3  32.8    MCHC 34.7  34.4  35.2    RDW 12.4  12.5  12.0    Platelets 390  325  427      No radiology results for the last 90 days.          Assessment:          Diagnosis Plan   1. Essential hypertension  CT Cardiac Calcium Score Without Dye    ECG 12 Lead             Plan:       1.  Patient has a history of hypertension, after his patient suffered from his blood pressure is well-controlled, continue same, creatinine BUN reviewed  2.  Patient has concern for occult cardiac issues, physical dam is unremarkable, EKG has no acute change.  We discussed and we will arrange for coronary artery calcium scan.    Thank you very much for allowing us to participate in the care of this pleasant patient.  Please don't hesitate to call if I can be of assistance in any way.      Current Outpatient Medications:     amLODIPine (NORVASC) 5 MG tablet, Take 1 tablet by mouth Daily., Disp: 90 tablet, Rfl: 3    Apoaequorin (PREVAGEN PO), Take 1 tablet by mouth Daily., Disp: , Rfl:     aspirin 81 MG tablet, Take 1 tablet by mouth Daily., Disp: , Rfl:     diphenhydrAMINE-APAP, sleep,  MG/30ML liquid, Take  by mouth., Disp: , Rfl:     fluticasone  (Flonase) 50 MCG/ACT nasal spray, Administer 2 sprays into the nostril(s) as directed by provider Daily., Disp: 18 g, Rfl: 2    hydroCHLOROthiazide 25 MG tablet, Take 1 tablet by mouth Daily., Disp: , Rfl:     ibuprofen (ADVIL,MOTRIN) 800 MG tablet, Take 1 tablet by mouth Every 8 (Eight) Hours As Needed for Mild Pain ., Disp: 30 tablet, Rfl: 0    losartan (COZAAR) 100 MG tablet, Take 1 tablet by mouth Daily., Disp: 90 tablet, Rfl: 3    MULTIPLE VITAMINS-MINERALS ER PO, Take 1 tablet by mouth Daily., Disp: , Rfl:     nebivolol (BYSTOLIC) 5 MG tablet, Take 1 tablet by mouth Daily., Disp: 30 tablet, Rfl: 6    Omega-3 Fatty Acids (FISH OIL) 1000 MG capsule capsule, Take 1 capsule by mouth Daily With Breakfast., Disp: , Rfl:     omeprazole (priLOSEC) 40 MG capsule, Take 1 capsule by mouth Daily., Disp: 90 capsule, Rfl: 2    prednisoLONE acetate (PRED FORTE) 1 % ophthalmic suspension, Administer 1 drop into the left eye Daily., Disp: 5 mL, Rfl: 1    sodium chloride 1 g tablet, Take 1 tablet by mouth Daily., Disp: 14 tablet, Rfl: 0    Synthroid 137 MCG tablet, Take 1 tablet by mouth Daily., Disp: 90 tablet, Rfl: 3    cyclobenzaprine (FLEXERIL) 10 MG tablet, Take 1 tablet by mouth three times daily as needed for muscle spasm (Patient not taking: Reported on 12/12/2024), Disp: 90 tablet, Rfl: 0  No current facility-administered medications for this visit.         No follow-ups on file.

## 2025-01-07 DIAGNOSIS — J30.9 ALLERGIC RHINITIS, UNSPECIFIED SEASONALITY, UNSPECIFIED TRIGGER: ICD-10-CM

## 2025-01-07 DIAGNOSIS — E03.9 HYPOTHYROIDISM, UNSPECIFIED TYPE: ICD-10-CM

## 2025-01-07 RX ORDER — FLUTICASONE PROPIONATE 50 MCG
2 SPRAY, SUSPENSION (ML) NASAL DAILY
Qty: 18 G | Refills: 2 | Status: SHIPPED | OUTPATIENT
Start: 2025-01-07

## 2025-01-07 RX ORDER — LEVOTHYROXINE SODIUM 137 MCG
137 TABLET ORAL DAILY
Qty: 90 TABLET | Refills: 3 | Status: SHIPPED | OUTPATIENT
Start: 2025-01-07

## 2025-01-07 RX ORDER — PREDNISOLONE ACETATE 10 MG/ML
1 SUSPENSION/ DROPS OPHTHALMIC DAILY
Qty: 5 ML | Refills: 1 | Status: SHIPPED | OUTPATIENT
Start: 2025-01-07

## 2025-01-30 NOTE — TELEPHONE ENCOUNTER
Procedure Note    Procedure:  Left peritrochanteric bursa injection of local anesthetic and steroids    Preoperative Diagnosis:    Left peritrochanteric bursitis    Postoperative Diagnosis:  Left peritrochanteric bursitis      Surgeon/Proceduralist: Dr. Yeh    Assistant:None     Anesthesia Type:None     Estimated Blood Loss:None      Specimen(s):None     Grafts/Implants:None      Complications:None    INFORMED CONSENT:    I went over the procedure with the patient, potential benefits and complications, including bleeding, infection, nerve injury, worsening of the patient pain.  I went over the procedure and the complications in very simple terms.  I answered all the patient questions.  The patient understood, agrees and wants to proceed. The risks and benefits as well as alternative options were explained to the patient in detail.    As it pertains to any injection there is an inherent risks of bleeding and infection.   If the patient experiences paresthesia during the procedure, it is expected to be transient.   If the patient experiences numbness or heaviness of the extremity blocked from the procedure, it is expected to be temporary, lasting only for the duration of the local anesthetic.  And as with any pain procedure, there is a possibility that the procedure may not completely relieve the pain to the satisfaction of the patient.   If the steroid is utilized, there is a small chance of immune suppression, acid reflux, heightened sense of awareness, elevation of blood glucose level.   If the procedure involves the thoracic area such as the rib cages, the possibility of pneumothorax was discussed including the chance of necessitating the use of a chest tube being inserted emergently.   The patient expresses understanding and wishes to proceed.       A time out was completed, verifying correct patient, procedure,site, positioning, and implants or special equipment.    TECHNIQUE:      The patient was sterilely  Caller: Yohan Lawler    Relationship: Self    Best call back number: [phone]    What medication are you requesting: CYCLOBENZAPR 10 MG TAKE 1 TABLET AS NEEDED FOR MUSCLE SPASMS    What are your current symptoms: PAIN IN ARM, NECK    How long have you been experiencing symptoms: 1 MONTH    Have you had these symptoms before:    [x] Yes  [] No    Have you been treated for these symptoms before:   [x] Yes  [] No    If a prescription is needed, what is your preferred pharmacy and phone number: [address] [address] [facility] [phone] [facility] [phone]       Additional notes: PLEASE CALL PATIENT IF UNABLE TO FILL.           prepped and draped in the usual sterile fashion.    Injection sites:  Left peritrochanteric bursa  The patient was injected with 10 cc of injectate solution containing 1% lidocaine with 40 mg of triamcinolone.  There were no complications.  Patient was comfortable and left without complaint.     Appropriate discharge instructions were given.  Follow-up was discussed with the patient.      Anil Yeh MD  INTERVENTIONAL PAIN MANAGEMENT SPECIALIST  1:56 PM  01/30/25

## 2025-02-16 DIAGNOSIS — K21.9 GASTROESOPHAGEAL REFLUX DISEASE WITHOUT ESOPHAGITIS: ICD-10-CM

## 2025-02-17 RX ORDER — OMEPRAZOLE 40 MG/1
40 CAPSULE, DELAYED RELEASE ORAL DAILY
Qty: 90 CAPSULE | Refills: 2 | Status: SHIPPED | OUTPATIENT
Start: 2025-02-17

## 2025-02-17 RX ORDER — HYDROCHLOROTHIAZIDE 25 MG/1
25 TABLET ORAL DAILY
Qty: 90 TABLET | Refills: 0 | Status: SHIPPED | OUTPATIENT
Start: 2025-02-17

## 2025-02-17 NOTE — TELEPHONE ENCOUNTER
Rx Refill Note  Requested Prescriptions     Pending Prescriptions Disp Refills    hydroCHLOROthiazide 25 MG tablet       Sig: Take 1 tablet by mouth Daily.      Last office visit with prescribing clinician: 11/21/2024   Last telemedicine visit with prescribing clinician: Visit date not found   Next office visit with prescribing clinician: 2/16/2025       Corey Cruz MA  02/17/25, 08:04 EST

## 2025-02-28 ENCOUNTER — ANESTHESIA (OUTPATIENT)
Dept: SURGERY | Facility: SURGERY CENTER | Age: 70
End: 2025-02-28
Payer: MEDICARE

## 2025-02-28 ENCOUNTER — HOSPITAL ENCOUNTER (OUTPATIENT)
Facility: SURGERY CENTER | Age: 70
Setting detail: HOSPITAL OUTPATIENT SURGERY
Discharge: HOME | End: 2025-02-28
Attending: INTERNAL MEDICINE | Admitting: INTERNAL MEDICINE
Payer: MEDICARE

## 2025-02-28 ENCOUNTER — ANESTHESIA EVENT (OUTPATIENT)
Dept: SURGERY | Facility: SURGERY CENTER | Age: 70
End: 2025-02-28
Payer: MEDICARE

## 2025-02-28 VITALS
HEART RATE: 77 BPM | OXYGEN SATURATION: 97 % | WEIGHT: 216.4 LBS | RESPIRATION RATE: 18 BRPM | DIASTOLIC BLOOD PRESSURE: 76 MMHG | TEMPERATURE: 98.2 F | HEIGHT: 70 IN | SYSTOLIC BLOOD PRESSURE: 121 MMHG | BODY MASS INDEX: 30.98 KG/M2

## 2025-02-28 DIAGNOSIS — Z86.0100 HISTORY OF COLONIC POLYPS: ICD-10-CM

## 2025-02-28 DIAGNOSIS — Z12.11 ENCOUNTER FOR SCREENING FOR MALIGNANT NEOPLASM OF COLON: ICD-10-CM

## 2025-02-28 PROCEDURE — 25810000003 LACTATED RINGERS PER 1000 ML: Performed by: INTERNAL MEDICINE

## 2025-02-28 PROCEDURE — 45385 COLONOSCOPY W/LESION REMOVAL: CPT | Performed by: INTERNAL MEDICINE

## 2025-02-28 PROCEDURE — 45380 COLONOSCOPY AND BIOPSY: CPT | Performed by: INTERNAL MEDICINE

## 2025-02-28 PROCEDURE — 25010000002 PROPOFOL 1000 MG/100ML EMULSION: Performed by: NURSE ANESTHETIST, CERTIFIED REGISTERED

## 2025-02-28 PROCEDURE — 25010000002 LIDOCAINE 2% SOLUTION: Performed by: NURSE ANESTHETIST, CERTIFIED REGISTERED

## 2025-02-28 PROCEDURE — 25010000002 PROPOFOL 10 MG/ML EMULSION: Performed by: NURSE ANESTHETIST, CERTIFIED REGISTERED

## 2025-02-28 PROCEDURE — 88305 TISSUE EXAM BY PATHOLOGIST: CPT | Performed by: INTERNAL MEDICINE

## 2025-02-28 RX ORDER — SODIUM CHLORIDE, SODIUM LACTATE, POTASSIUM CHLORIDE, CALCIUM CHLORIDE 600; 310; 30; 20 MG/100ML; MG/100ML; MG/100ML; MG/100ML
30 INJECTION, SOLUTION INTRAVENOUS CONTINUOUS PRN
Status: DISCONTINUED | OUTPATIENT
Start: 2025-02-28 | End: 2025-02-28 | Stop reason: HOSPADM

## 2025-02-28 RX ORDER — SODIUM CHLORIDE 0.9 % (FLUSH) 0.9 %
10 SYRINGE (ML) INJECTION AS NEEDED
Status: DISCONTINUED | OUTPATIENT
Start: 2025-02-28 | End: 2025-02-28 | Stop reason: HOSPADM

## 2025-02-28 RX ORDER — LIDOCAINE HYDROCHLORIDE 20 MG/ML
INJECTION, SOLUTION INFILTRATION; PERINEURAL AS NEEDED
Status: DISCONTINUED | OUTPATIENT
Start: 2025-02-28 | End: 2025-02-28 | Stop reason: SURG

## 2025-02-28 RX ORDER — SODIUM CHLORIDE, SODIUM LACTATE, POTASSIUM CHLORIDE, CALCIUM CHLORIDE 600; 310; 30; 20 MG/100ML; MG/100ML; MG/100ML; MG/100ML
30 INJECTION, SOLUTION INTRAVENOUS CONTINUOUS
Status: ACTIVE | OUTPATIENT
Start: 2025-02-28 | End: 2025-02-28

## 2025-02-28 RX ORDER — LIDOCAINE HYDROCHLORIDE 10 MG/ML
0.5 INJECTION, SOLUTION INFILTRATION; PERINEURAL ONCE AS NEEDED
Status: DISCONTINUED | OUTPATIENT
Start: 2025-02-28 | End: 2025-02-28 | Stop reason: HOSPADM

## 2025-02-28 RX ORDER — PROPOFOL 10 MG/ML
INJECTION, EMULSION INTRAVENOUS AS NEEDED
Status: DISCONTINUED | OUTPATIENT
Start: 2025-02-28 | End: 2025-02-28 | Stop reason: SURG

## 2025-02-28 RX ORDER — SODIUM CHLORIDE 0.9 % (FLUSH) 0.9 %
3 SYRINGE (ML) INJECTION EVERY 12 HOURS SCHEDULED
Status: DISCONTINUED | OUTPATIENT
Start: 2025-02-28 | End: 2025-02-28 | Stop reason: HOSPADM

## 2025-02-28 RX ADMIN — SODIUM CHLORIDE, POTASSIUM CHLORIDE, SODIUM LACTATE AND CALCIUM CHLORIDE 30 ML/HR: 600; 310; 30; 20 INJECTION, SOLUTION INTRAVENOUS at 09:54

## 2025-02-28 RX ADMIN — PROPOFOL 100 MG: 10 INJECTION, EMULSION INTRAVENOUS at 10:06

## 2025-02-28 RX ADMIN — LIDOCAINE HYDROCHLORIDE 60 MG: 20 INJECTION, SOLUTION INFILTRATION; PERINEURAL at 10:06

## 2025-02-28 RX ADMIN — PROPOFOL 160 MCG/KG/MIN: 10 INJECTION, EMULSION INTRAVENOUS at 10:06

## 2025-02-28 NOTE — H&P
No chief complaint on file.      HPI  H/o polyps         Problem List:    Patient Active Problem List   Diagnosis    Essential hypertension    Hypothyroidism    GERD (gastroesophageal reflux disease)    Blood glucose elevated    Bursitis    Carpal tunnel syndrome    Tingling    Benign essential HTN    Personal history of colonic polyps    Polyp of colon    Hearing loss    Esophageal reflux    Encounter for screening for malignant neoplasm of colon       Medical History:    Past Medical History:   Diagnosis Date    Allergic     Arthritis     Disease of thyroid gland     GERD (gastroesophageal reflux disease)     Heart murmur     Hypertension     Low back pain         Social History:    Social History     Socioeconomic History    Marital status:    Tobacco Use    Smoking status: Never     Passive exposure: Never    Smokeless tobacco: Never   Vaping Use    Vaping status: Never Used   Substance and Sexual Activity    Alcohol use: Not Currently     Alcohol/week: 12.0 standard drinks of alcohol    Drug use: No    Sexual activity: Not Currently     Partners: Female     Birth control/protection: None       Family History:   Family History   Problem Relation Age of Onset    Arthritis Mother     Hypertension Mother     Thyroid disease Sister     Thyroid disease Sister        Surgical History:   Past Surgical History:   Procedure Laterality Date    COLONOSCOPY      EPIDURAL BLOCK      EYE SURGERY      Detached Retuina    HAND FUSION Right        No current facility-administered medications for this encounter.    Current Outpatient Medications:     amLODIPine (NORVASC) 5 MG tablet, Take 1 tablet by mouth Daily., Disp: 90 tablet, Rfl: 3    Apoaequorin (PREVAGEN PO), Take 1 tablet by mouth Daily., Disp: , Rfl:     aspirin 81 MG tablet, Take 1 tablet by mouth Daily., Disp: , Rfl:     diphenhydrAMINE-APAP, sleep,  MG/30ML liquid, Take  by mouth., Disp: , Rfl:     fluticasone (Flonase) 50 MCG/ACT nasal spray,  Administer 2 sprays into the nostril(s) as directed by provider Daily., Disp: 18 g, Rfl: 2    hydroCHLOROthiazide 25 MG tablet, Take 1 tablet by mouth Daily., Disp: 90 tablet, Rfl: 0    ibuprofen (ADVIL,MOTRIN) 800 MG tablet, Take 1 tablet by mouth Every 8 (Eight) Hours As Needed for Mild Pain ., Disp: 30 tablet, Rfl: 0    losartan (COZAAR) 100 MG tablet, Take 1 tablet by mouth Daily., Disp: 90 tablet, Rfl: 3    MULTIPLE VITAMINS-MINERALS ER PO, Take 1 tablet by mouth Daily., Disp: , Rfl:     nebivolol (BYSTOLIC) 5 MG tablet, Take 1 tablet by mouth Daily., Disp: 30 tablet, Rfl: 6    Omega-3 Fatty Acids (FISH OIL) 1000 MG capsule capsule, Take 1 capsule by mouth Daily With Breakfast., Disp: , Rfl:     omeprazole (priLOSEC) 40 MG capsule, Take 1 capsule by mouth Daily., Disp: 90 capsule, Rfl: 2    prednisoLONE acetate (PRED FORTE) 1 % ophthalmic suspension, Administer 1 drop into the left eye Daily., Disp: 5 mL, Rfl: 1    Synthroid 137 MCG tablet, Take 1 tablet by mouth Daily., Disp: 90 tablet, Rfl: 3    Allergies: No Known Allergies     The following portions of the patient's history were reviewed by me and updated as appropriate: review of systems, allergies, current medications, past family history, past medical history, past social history, past surgical history and problem list.    There were no vitals filed for this visit.    PHYSICAL EXAM:    CONSTITUTIONAL:  today's vital signs reviewed by me  GASTROINTESTINAL: abdomen is soft nontender nondistended with normal active bowel sounds, no masses are appreciated    Assessment/ Plan  H/o polyps    colonoscopy    Risks and benefits as well as alternatives to endoscopic evaluation were explained to the patient and they voiced understanding and wish to proceed.  These risks include but are not limited to the risk of bleeding, perforation, adverse reaction to sedation, and missed lesions.  The patient was given the opportunity to ask questions prior to the endoscopic  procedure.

## 2025-02-28 NOTE — ANESTHESIA POSTPROCEDURE EVALUATION
Patient: Yohan Lawler Jr.    Procedure Summary       Date: 02/28/25 Room / Location: [facility]  / [facility]    Anesthesia Start: 1002 Anesthesia Stop: 1022    Procedure: COLONOSCOPY Diagnosis:       Encounter for screening for malignant neoplasm of colon      History of colonic polyps      (Encounter for screening for malignant neoplasm of colon [Z12.11])      (History of colonic polyps [Z86.0100])    Surgeons: Jeremiah Dockery MD Provider:     Anesthesia Type: MAC ASA Status: 2            Anesthesia Type: MAC    Vitals  Vitals Value Taken Time   /97 02/28/25 1053   Temp 36.8 °C (98.2 °F) 02/28/25 1023   Pulse 79 02/28/25 1043   Resp 18 02/28/25 1035   SpO2 95 % 02/28/25 1043   Vitals shown include unfiled device data.        Post Anesthesia Care and Evaluation    Patient location during evaluation: PHASE II  Patient participation: complete - patient participated  Level of consciousness: awake  Pain management: satisfactory to patient    Airway patency: patent  Anesthetic complications: No anesthetic complications  PONV Status: controlled  Cardiovascular status: acceptable  Respiratory status: acceptable  Hydration status: acceptable

## 2025-02-28 NOTE — ANESTHESIA PREPROCEDURE EVALUATION
Anesthesia Evaluation     Patient summary reviewed and Nursing notes reviewed   NPO Solid Status: > 6 hours  NPO Liquid Status: > 2 hours           Airway   Mallampati: II  TM distance: >3 FB  Neck ROM: full  Dental - normal exam     Pulmonary    (-) COPD, asthma, not a smoker  Cardiovascular   Exercise tolerance: good (4-7 METS)    ECG reviewed    (+) hypertension  (-) past MI, dysrhythmias, angina      Neuro/Psych  (-) seizures, CVA  GI/Hepatic/Renal/Endo    (+) obesity, GERD well controlled, thyroid problem hypothyroidism  (-) liver disease, no renal disease, diabetes    Musculoskeletal     Abdominal    Substance History      OB/GYN          Other                          Anesthesia Plan    ASA 2     MAC       Anesthetic plan, risks, benefits, and alternatives have been provided, discussed and informed consent has been obtained with: patient.        CODE STATUS:

## 2025-03-03 LAB
CYTO UR: NORMAL
LAB AP CASE REPORT: NORMAL
PATH REPORT.FINAL DX SPEC: NORMAL
PATH REPORT.GROSS SPEC: NORMAL

## 2025-03-18 ENCOUNTER — OFFICE VISIT (OUTPATIENT)
Dept: INTERNAL MEDICINE | Facility: CLINIC | Age: 70
End: 2025-03-18
Payer: MEDICARE

## 2025-03-18 VITALS
BODY MASS INDEX: 29.2 KG/M2 | WEIGHT: 204 LBS | TEMPERATURE: 97.9 F | HEART RATE: 80 BPM | RESPIRATION RATE: 16 BRPM | OXYGEN SATURATION: 95 % | DIASTOLIC BLOOD PRESSURE: 82 MMHG | HEIGHT: 70 IN | SYSTOLIC BLOOD PRESSURE: 128 MMHG

## 2025-03-18 DIAGNOSIS — J30.9 ALLERGIC RHINITIS, UNSPECIFIED SEASONALITY, UNSPECIFIED TRIGGER: ICD-10-CM

## 2025-03-18 DIAGNOSIS — E53.8 VITAMIN B12 DEFICIENCY: ICD-10-CM

## 2025-03-18 DIAGNOSIS — E03.9 HYPOTHYROIDISM, UNSPECIFIED TYPE: Primary | ICD-10-CM

## 2025-03-18 DIAGNOSIS — R73.03 PREDIABETES: ICD-10-CM

## 2025-03-18 DIAGNOSIS — I10 ESSENTIAL HYPERTENSION: ICD-10-CM

## 2025-03-18 DIAGNOSIS — E78.5 DYSLIPIDEMIA: ICD-10-CM

## 2025-03-18 RX ORDER — LORATADINE 10 MG/1
10 TABLET ORAL DAILY
Qty: 90 TABLET | Refills: 1 | Status: SHIPPED | OUTPATIENT
Start: 2025-03-18

## 2025-03-18 RX ORDER — MONTELUKAST SODIUM 10 MG/1
10 TABLET ORAL NIGHTLY
Qty: 90 TABLET | Refills: 1 | Status: SHIPPED | OUTPATIENT
Start: 2025-03-18

## 2025-03-18 NOTE — PROGRESS NOTES
"Chief Complaint  Primary Care Follow-Up (On Blood work)    Subjective          Yohan Lawler JrRyder presents to [facility] PRIMARY CARE  History of Present Illness  The patient is a 69-year-old male who came in today because his allergies are really bothering him. He describes them as 'driving him crazy.' He hasn't tried Claritin before but is open to giving it a shot. Currently, he's using Flonase nasal spray to manage his symptoms but isn't taking any antihistamines like Claritin, Zyrtec, or Allegra.    He has a history of hypothyroidism and is taking Synthroid 137 mcg daily. He also has essential hypertension and is on hydrochlorothiazide 25 mg daily, amlodipine 5 mg daily, losartan 100 mg daily, and Bystolic 5 mg daily.    Regarding his cholesterol, he has dyslipidemia but isn't on any medication for it. He was concerned about his cholesterol levels during his last blood work. He used to have a protein shake and a banana before his morning workouts on his [employer] bike, which last 30 to 40 minutes, but he has since stopped this practice.    He recently saw a cardiologist, Dr. America Abraham, who checked his heart and said everything looks good with his blood flow. He didn't get a calcium score test done. He also had a colonoscopy last week or the week before, which found 3 polyps that came back negative.    MEDICATIONS  Synthroid, hydrochlorothiazide, amlodipine, losartan, Bystolic, Flonase.    Objective   Vital Signs:   /82   Pulse 80   Temp 97.9 °F (36.6 °C) (Oral)   Resp 16   Ht 177.8 cm (70\")   Wt 92.5 kg (204 lb)   SpO2 95%   BMI 29.27 kg/m²     Physical Exam  Vitals and nursing note reviewed.   Constitutional:       Appearance: He is well-developed.   HENT:      Head: Normocephalic and atraumatic.   Musculoskeletal:      Cervical back: Normal range of motion and neck supple.   Neurological:      Mental Status: He is alert and oriented to person, place, and time. "   Psychiatric:         Behavior: Behavior normal.         Physical Exam  Vital Signs  Blood pressure is 128/82.     Result Review :                 Assessment and Plan    Diagnoses and all orders for this visit:    1. Hypothyroidism, unspecified type (Primary)  -     Thyroid Panel With TSH    2. Allergic rhinitis, unspecified seasonality, unspecified trigger  -     loratadine (Claritin) 10 MG tablet; Take 1 tablet by mouth Daily.  Dispense: 90 tablet; Refill: 1  -     montelukast (Singulair) 10 MG tablet; Take 1 tablet by mouth Every Night.  Dispense: 90 tablet; Refill: 1    3. Vitamin B12 deficiency  -     Vitamin B12    4. Essential hypertension  -     CBC & Differential  -     Comprehensive Metabolic Panel    5. Prediabetes  -     Hemoglobin A1c    6. Dyslipidemia  -     Lipid Panel With LDL / HDL Ratio      Assessment & Plan  1. Allergic rhinitis.  He will continue using Flonase. A prescription for Claritin has been provided. If symptoms persist, Singulair will be added to the treatment regimen. Potential side effects of Singulair, including depression, have been discussed. He is advised to discontinue Singulair if depressive symptoms occur.    2. Hypothyroidism.  He is currently taking Synthroid 137 mcg daily. Blood work will be conducted today to monitor thyroid levels.    3. Essential hypertension.  His blood pressure is well-controlled at 128/82 mmHg. He is currently taking hydrochlorothiazide 25 mg daily, amlodipine 5 mg daily, losartan 100 mg daily, and Bystolic 5 mg daily.    4. Dyslipidemia.  His cholesterol levels were within normal limits 3 months ago. He is not currently on any cholesterol-lowering medication. Blood work will be conducted today to monitor cholesterol levels.    Follow-up  The patient will follow up in 3 months for a Medicare wellness visit.    PROCEDURE  Colonoscopy performed last week or the week before revealed 3 polyps, which came back negative.    Follow Up   No follow-ups on  file.  Patient was given instructions and counseling regarding his condition or for health maintenance advice. Please see specific information pulled into the AVS if appropriate.           Patient or patient representative verbalized consent for the use of Ambient Listening during the visit with  Rusty Hickey MD for chart documentation. 3/18/2025  07:58 EDT

## 2025-03-19 LAB
ALBUMIN SERPL-MCNC: 4.5 G/DL (ref 3.5–5.2)
ALBUMIN/GLOB SERPL: 1.6 G/DL
ALP SERPL-CCNC: 76 U/L (ref 39–117)
ALT SERPL-CCNC: 37 U/L (ref 1–41)
AST SERPL-CCNC: 29 U/L (ref 1–40)
BASOPHILS # BLD AUTO: 0.04 10*3/MM3 (ref 0–0.2)
BASOPHILS NFR BLD AUTO: 0.6 % (ref 0–1.5)
BILIRUB SERPL-MCNC: 0.6 MG/DL (ref 0–1.2)
BUN SERPL-MCNC: 5 MG/DL (ref 8–23)
BUN/CREAT SERPL: 6.3 (ref 7–25)
CALCIUM SERPL-MCNC: 9.6 MG/DL (ref 8.6–10.5)
CHLORIDE SERPL-SCNC: 93 MMOL/L (ref 98–107)
CHOLEST SERPL-MCNC: 161 MG/DL (ref 0–200)
CO2 SERPL-SCNC: 26.3 MMOL/L (ref 22–29)
CREAT SERPL-MCNC: 0.8 MG/DL (ref 0.76–1.27)
EGFRCR SERPLBLD CKD-EPI 2021: 94.6 ML/MIN/1.73
EOSINOPHIL # BLD AUTO: 0.41 10*3/MM3 (ref 0–0.4)
EOSINOPHIL NFR BLD AUTO: 6.2 % (ref 0.3–6.2)
ERYTHROCYTE [DISTWIDTH] IN BLOOD BY AUTOMATED COUNT: 12.4 % (ref 12.3–15.4)
FT4I SERPL CALC-MCNC: 2.6 (ref 1.2–4.9)
GLOBULIN SER CALC-MCNC: 2.8 GM/DL
GLUCOSE SERPL-MCNC: 99 MG/DL (ref 65–99)
HBA1C MFR BLD: 5.5 % (ref 4.8–5.6)
HCT VFR BLD AUTO: 47.5 % (ref 37.5–51)
HDLC SERPL-MCNC: 44 MG/DL (ref 40–60)
HGB BLD-MCNC: 15.9 G/DL (ref 13–17.7)
IMM GRANULOCYTES # BLD AUTO: 0.03 10*3/MM3 (ref 0–0.05)
IMM GRANULOCYTES NFR BLD AUTO: 0.5 % (ref 0–0.5)
LDLC SERPL CALC-MCNC: 98 MG/DL (ref 0–100)
LDLC/HDLC SERPL: 2.19 {RATIO}
LYMPHOCYTES # BLD AUTO: 1.82 10*3/MM3 (ref 0.7–3.1)
LYMPHOCYTES NFR BLD AUTO: 27.4 % (ref 19.6–45.3)
MCH RBC QN AUTO: 31 PG (ref 26.6–33)
MCHC RBC AUTO-ENTMCNC: 33.5 G/DL (ref 31.5–35.7)
MCV RBC AUTO: 92.6 FL (ref 79–97)
MONOCYTES # BLD AUTO: 0.55 10*3/MM3 (ref 0.1–0.9)
MONOCYTES NFR BLD AUTO: 8.3 % (ref 5–12)
NEUTROPHILS # BLD AUTO: 3.8 10*3/MM3 (ref 1.7–7)
NEUTROPHILS NFR BLD AUTO: 57 % (ref 42.7–76)
NRBC BLD AUTO-RTO: 0 /100 WBC (ref 0–0.2)
PLATELET # BLD AUTO: 382 10*3/MM3 (ref 140–450)
POTASSIUM SERPL-SCNC: 4.1 MMOL/L (ref 3.5–5.2)
PROT SERPL-MCNC: 7.3 G/DL (ref 6–8.5)
RBC # BLD AUTO: 5.13 10*6/MM3 (ref 4.14–5.8)
SODIUM SERPL-SCNC: 134 MMOL/L (ref 136–145)
T3RU NFR SERPL: 27 % (ref 24–39)
T4 SERPL-MCNC: 9.5 UG/DL (ref 4.5–12)
TRIGL SERPL-MCNC: 104 MG/DL (ref 0–150)
TSH SERPL DL<=0.005 MIU/L-ACNC: 2.87 UIU/ML (ref 0.45–4.5)
VIT B12 SERPL-MCNC: 460 PG/ML (ref 211–946)
VLDLC SERPL CALC-MCNC: 19 MG/DL (ref 5–40)
WBC # BLD AUTO: 6.65 10*3/MM3 (ref 3.4–10.8)

## 2025-03-25 DIAGNOSIS — I10 ESSENTIAL HYPERTENSION: ICD-10-CM

## 2025-03-25 RX ORDER — LOSARTAN POTASSIUM 100 MG/1
100 TABLET ORAL DAILY
Qty: 90 TABLET | Refills: 0 | Status: SHIPPED | OUTPATIENT
Start: 2025-03-25

## 2025-04-08 DIAGNOSIS — E03.9 HYPOTHYROIDISM, UNSPECIFIED TYPE: ICD-10-CM

## 2025-04-08 RX ORDER — LEVOTHYROXINE SODIUM 137 MCG
137 TABLET ORAL DAILY
Qty: 90 TABLET | Refills: 3 | COMMUNITY
Start: 2025-04-08

## 2025-04-12 DIAGNOSIS — J30.9 ALLERGIC RHINITIS, UNSPECIFIED SEASONALITY, UNSPECIFIED TRIGGER: ICD-10-CM

## 2025-04-14 RX ORDER — FLUTICASONE PROPIONATE 50 MCG
2 SPRAY, SUSPENSION (ML) NASAL DAILY
Qty: 18 G | Refills: 2 | Status: SHIPPED | OUTPATIENT
Start: 2025-04-14

## 2025-04-16 DIAGNOSIS — S39.012D BACK STRAIN, SUBSEQUENT ENCOUNTER: Primary | ICD-10-CM

## 2025-04-21 RX ORDER — PREDNISOLONE ACETATE 10 MG/ML
1 SUSPENSION/ DROPS OPHTHALMIC DAILY
Qty: 5 ML | Refills: 1 | Status: SHIPPED | OUTPATIENT
Start: 2025-04-21

## 2025-04-22 DIAGNOSIS — I10 ESSENTIAL HYPERTENSION: ICD-10-CM

## 2025-04-22 RX ORDER — AMLODIPINE BESYLATE 5 MG/1
5 TABLET ORAL DAILY
Qty: 90 TABLET | Refills: 0 | Status: SHIPPED | OUTPATIENT
Start: 2025-04-22

## 2025-04-22 NOTE — TELEPHONE ENCOUNTER
Rx Refill Note  Requested Prescriptions     Pending Prescriptions Disp Refills    amLODIPine (NORVASC) 5 MG tablet 90 tablet 3     Sig: Take 1 tablet by mouth Daily.      Last office visit with prescribing clinician: 3/18/2025   Last telemedicine visit with prescribing clinician: Visit date not found   Next office visit with prescribing clinician: 6/24/2025       Betty Jc  04/22/25, 09:42 EDT

## 2025-05-11 DIAGNOSIS — E03.9 HYPOTHYROIDISM, UNSPECIFIED TYPE: ICD-10-CM

## 2025-05-12 RX ORDER — LEVOTHYROXINE SODIUM 137 MCG
137 TABLET ORAL DAILY
Qty: 90 TABLET | Refills: 3 | COMMUNITY
Start: 2025-05-12

## 2025-05-16 DIAGNOSIS — K21.9 GASTROESOPHAGEAL REFLUX DISEASE WITHOUT ESOPHAGITIS: ICD-10-CM

## 2025-05-16 DIAGNOSIS — J30.9 ALLERGIC RHINITIS, UNSPECIFIED SEASONALITY, UNSPECIFIED TRIGGER: ICD-10-CM

## 2025-05-16 RX ORDER — OMEPRAZOLE 40 MG/1
40 CAPSULE, DELAYED RELEASE ORAL DAILY
Qty: 90 CAPSULE | Refills: 0 | Status: SHIPPED | OUTPATIENT
Start: 2025-05-16

## 2025-05-16 RX ORDER — HYDROCHLOROTHIAZIDE 25 MG/1
25 TABLET ORAL DAILY
Qty: 90 TABLET | Refills: 0 | Status: SHIPPED | OUTPATIENT
Start: 2025-05-16

## 2025-05-16 RX ORDER — FLUTICASONE PROPIONATE 50 MCG
2 SPRAY, SUSPENSION (ML) NASAL DAILY
Qty: 18 G | Refills: 2 | Status: SHIPPED | OUTPATIENT
Start: 2025-05-16

## 2025-05-16 RX ORDER — HYDROCHLOROTHIAZIDE 25 MG/1
25 TABLET ORAL DAILY
Qty: 90 TABLET | Refills: 0 | Status: CANCELLED | OUTPATIENT
Start: 2025-05-16

## 2025-05-16 NOTE — TELEPHONE ENCOUNTER
Rx Refill Note  Requested Prescriptions     Pending Prescriptions Disp Refills    omeprazole (priLOSEC) 40 MG capsule 90 capsule 2     Sig: Take 1 capsule by mouth Daily.      Last office visit with prescribing clinician: 3/18/2025   Last telemedicine visit with prescribing clinician: Visit date not found   Next office visit with prescribing clinician: 6/24/2025   {RADHA Jc  05/16/25, 12:51 EDT

## 2025-05-16 NOTE — TELEPHONE ENCOUNTER
Rx Refill Note  Requested Prescriptions     Pending Prescriptions Disp Refills    fluticasone (Flonase) 50 MCG/ACT nasal spray 18 g 2     Sig: Administer 2 sprays into the nostril(s) as directed by provider Daily.      Last office visit with prescribing clinician: 3/18/2025   Last telemedicine visit with prescribing clinician: Visit date not found   Next office visit with prescribing clinician: 5/16/2025       Betty Jc  05/16/25, 12:50 EDT

## 2025-05-19 RX ORDER — PREDNISOLONE ACETATE 10 MG/ML
1 SUSPENSION/ DROPS OPHTHALMIC DAILY
Qty: 5 ML | Refills: 1 | Status: SHIPPED | OUTPATIENT
Start: 2025-05-19

## 2025-05-19 NOTE — TELEPHONE ENCOUNTER
Rx Refill Note  Requested Prescriptions     Pending Prescriptions Disp Refills    prednisoLONE acetate (PRED FORTE) 1 % ophthalmic suspension 5 mL 1     Sig: Administer 1 drop into the left eye Daily.      Last office visit with prescribing clinician: 3/18/2025   Last telemedicine visit with prescribing clinician: Visit date not found   Next office visit with prescribing clinician: 5/16/2025       Betty Jc  05/19/25, 08:14 EDT

## 2025-06-12 DIAGNOSIS — J30.9 ALLERGIC RHINITIS, UNSPECIFIED SEASONALITY, UNSPECIFIED TRIGGER: ICD-10-CM

## 2025-06-13 RX ORDER — LORATADINE 10 MG/1
10 TABLET ORAL DAILY
Qty: 90 TABLET | Refills: 1 | Status: SHIPPED | OUTPATIENT
Start: 2025-06-13

## 2025-06-13 RX ORDER — FLUTICASONE PROPIONATE 50 MCG
2 SPRAY, SUSPENSION (ML) NASAL DAILY
Qty: 18 G | Refills: 2 | Status: SHIPPED | OUTPATIENT
Start: 2025-06-13

## 2025-06-17 NOTE — PROGRESS NOTES
Patient Name: Yohan Lawler Jr.   YOB: 1955  Referring Primary Care Physician: Rusty Hickey MD      Chief Complaint:    Chief Complaint   Patient presents with    Lumbar Spine - Pain, Initial Evaluation        Previous Treatment:   Former Dr. Henry patient, last seen 11/15/2022    MRI:   04/15/2018 - MRI of L-Spine W/O ([facility])     CT:   06/13/2021 - CT of L-Spine W/O ([facility])     Pain Management:   11/29/2022 - 10/15/2024 - [facility]   Injections:   10/15/2024 - [facility]   03/05/2024 - [facility]  11/30/2023 - [facility]I  09/06/2023 - LESI   05/31/2023 - LESI  02/28/2023 - LESI  11/29/2022 - LESI     04/26/2018 - 08/10/2021 - [facility]    Neurosurgery:   07/07/2021 - [facility] - Sarah Malagon MD   04/04/2018 - 04/16/2018 - [facility] - Deeds, Janice, APRN      Back Pain  Chronicity:  Chronic  Onset:  More than 1 year ago  Frequency:  Constantly  Progression since onset:  Worsening  Pain location:  Lumbar spine  Quality: throbbing.  Radiates to:  Does not radiate  Pain-numeric:  5/10  Pain severity:  Moderate  Aggravated by:  Bending (Kneeling)  Stiffness is present:  In the morning  Associated symptoms: no bladder incontinence, no bowel incontinence, no leg pain, no numbness, no tingling and no weakness    Treatments tried:  Physical therapy, NSAIDs, heat and home exercises (Stretching)  Improvement on treatment:  Moderate       HPI:  Yohan Lawler Jr. is a 69 y.o. male who presents to [facility] for evaluation of acute on chronic low back pain referring across the beltline.  Patient has had intermittent back pain and occasional pain referring into the right thigh on and off for at least 5 years.  He also has history of L3 and L4 lumbar compression deformities.  He has been evaluated at [facility] and by Dr. Henry.  So far he has completed a round of  physical therapy and keeps up with those stretching exercises.  He says if he does not stretch in the morning, his pain is much worse.  He has also been relying on epidural injections and doing fairly well with those over the years.  Unfortunately his last epidural in October 2024 did not offer him as much relief as usual.  He denies bowel or bladder dysfunction or saddle anesthesia.  He is active and cares for his wife.  This is an established patient to the practice who last saw Dr. Amos 11/15/2022, new to me.  He is unaccompanied today.  Prior pertinent records were reviewed.    PFSH:  See attached    ROS: As per HPI, otherwise negative    Objective:      69 y.o. male  Body mass index is 31.34 kg/m²., 99.1 kg (218 lb 6.4 oz), @HT@  Vitals:    06/18/25 0950   Temp: 97.5 °F (36.4 °C)     Pain Score    06/18/25 0950   PainSc: 5    PainLoc: Back            Spine Musculoskeletal Exam    Gait    Gait is normal.    Palpation    Thoracolumbar    Right      Muscle tone: normal    Left      Muscle tone: normal    Strength    Thoracolumbar    Thoracolumbar motor exam is normal.       Sensory    Thoracolumbar    Thoracolumbar sensation is normal.    Reflexes    Right      Quadriceps: 0/4      Achilles: 0/4     Left      Quadriceps: 0/4      Achilles: 0/4    Special Tests    Thoracolumbar      Right      SLR: no back or leg pain      Left      SLR: no back or leg pain    General      Constitutional: well-developed and well-nourished    Scleral icterus: no    Labored breathing: no    Psychiatric: normal mood and affect and no acute distress    Neurological: alert and oriented x3    Skin: intact        IMAGING:     Indication: pain related symptoms,  Views: 2V AP&LAT flexion-extension lumbar  Findings: No obvious dynamic instability between flexion-extension, 20 mm anterolisthesis L5 on S1, advanced degenerative disc and facet changes, chronic L3 and L4 compression deformities  Comparison views: Similar to  11/15/2022      Official radiology interpretation will follow and be available in the patient medical records. Patient will be notified of any pertinent discrepancies.    Addendum 06/23/2025:.  Superior L3 endplate compression deformity has developed  within the interim with 30% volume loss. L4 compression deformity  remains stable. Grade 2 anterolisthesis of L5 on S1 as before, stable in the neutral, flexion and extension views. There is disc space loss at L5-S1. Lower lumbar facet hypertrophy. The remainder is unremarkable  Assessment:           Diagnoses and all orders for this visit:    1. Degeneration of intervertebral disc of lumbar region with lower extremity pain (Primary)  -     Ambulatory Referral to Pain Management Clinic  -     MRI Lumbar Spine Without Contrast; Future             Plan:  We had a lengthy discussion today regarding chronic back pain and his deformity at L5-S1.  He has known about this for years.  There has been some discussion of having him see a surgeon at [facility] spine, but he was adamant to avoid surgery.  He has done fairly well with keeping up with his therapy stretches and epidural injections.  Unfortunately he did not gain long-lasting relief from his last epidural injection.  There are other injection options we could consider such as RFA or SI joint injections.  Since he is still reluctant to consider surgery especially given the fact he helps care for his wife and does not have the time for his own recovery, we will exhaust conservative efforts.  We discussed core exercises today and the importance of keeping his core strong to add to his current home exercise program.  We also discussed RFA versus SI joint injections to add to the epidural injections.  He lives in [place] so closer to [place] and since they are more comprehensive, will have him evaluated there to continue the epidural injections I discussed LMBB/RFA as well.  May also be a candidate for SI joint  injections.  Will also update lumbar MRI as his is 7 years old, this may help pain management determine best levels of treatment.  For now he will follow-up as needed, if he fails to gain relief from injections next option is referral to neurosurgery, likely Dr. Dowling.    Return if symptoms worsen or fail to improve.    Facet injections are injections of medications into the actual facet joints. Facet joints are located between each set of vertebrae in the spine from the neck to the tailbone. Facet joints allow each vertebra to move against the vertebra just above and just below it.   A facet joint connects one vertebra to another vertebra. When patients complain about neck pain or back pain, we examine these facet joints. We commonly find there is inflammation in these facet joints, which are aggravated when you extend them via sweeping like motions.   During a Facet Joint Injection we inject a steroid and numbing medicine into the specific facet joint that causes the pain. Afterwards the inflammation should subside as well as the pain.   Facet joint injections typically have two aims: Help diagnose the cause and location of the pain and provide pain relief to the effected area.    Diagnostics: By placing numbing medicine into the facet joint, the amount of immediate pain relief experienced by the patient will help determine if the facet joint is a source of pain. If complete pain relief is achieved while the facet joint is numb, it means that joint is likely a source of pain.    Pain Relief: Along with the numbing medication, a facet joint injection also includes injecting time-release steroid (cortisone) into the facet joint to reduce inflammation, which can sometimes provide longer-term pain relief     EMR Dragon/Transcription Disclaimer:   Much of this encounter note is an electronic transcription/translation of spoken language to printed text utilizing Dragon dictation.  Red flags have been discussed at this  or previous visits to include but not limited weakness in extremities, worsening pain that does not respond to conservative treatment and bowel or bladder dysfunction. These are reasons to present to ER and patient has been informed.    The diagnosis(es), natural history, pathophysiology and treatment for diagnosis(es) were discussed. Opportunity given and questions answered. Biomechanics of pertinent body areas discussed.    EXERCISES:  Advice on benefits of, and types of regular/moderate exercise pertaining to diagnosis.  Continue HEP. For back or neck pain, recommend pilates and or yoga as tolerated. Generally it is best to start any new exercise under the guidance of a  or therapist.   MEDICATIONS:  When prescribe, the risks, benefits, warnings,side effects and alternatives of medications discussed. Advised against long term use of narcotics.   PAIN CONTROL:  Cold, heat, OTC lidocaine patches and/or ointment as needed. Avoid direct skin contact with ice. Ice 15-20 minutes 3-4 times daily as needed. For SI joint pain, recommend ice bath in water about 50 degrees for 5 consecutive days, add ice slowly to help with adjustment and may cover with warm towel or robe to help with cold tolerance. If using lidocaine, do not apply heat in conjunction as this can cause a burn.   MEDICAL RECORDS reviewed from other provider(s) for past and current medical history pertinent to this visit..

## 2025-06-18 ENCOUNTER — OFFICE VISIT (OUTPATIENT)
Dept: ORTHOPEDIC SURGERY | Facility: CLINIC | Age: 70
End: 2025-06-18
Payer: MEDICARE

## 2025-06-18 VITALS — WEIGHT: 218.4 LBS | HEIGHT: 70 IN | TEMPERATURE: 97.5 F | BODY MASS INDEX: 31.26 KG/M2

## 2025-06-18 DIAGNOSIS — M51.361 DEGENERATION OF INTERVERTEBRAL DISC OF LUMBAR REGION WITH LOWER EXTREMITY PAIN: Primary | ICD-10-CM

## 2025-06-18 PROCEDURE — 99214 OFFICE O/P EST MOD 30 MIN: CPT | Performed by: NURSE PRACTITIONER

## 2025-06-18 PROCEDURE — 1160F RVW MEDS BY RX/DR IN RCRD: CPT | Performed by: NURSE PRACTITIONER

## 2025-06-18 PROCEDURE — 1159F MED LIST DOCD IN RCRD: CPT | Performed by: NURSE PRACTITIONER

## 2025-06-20 DIAGNOSIS — I10 ESSENTIAL HYPERTENSION: ICD-10-CM

## 2025-06-20 RX ORDER — LOSARTAN POTASSIUM 100 MG/1
100 TABLET ORAL DAILY
Qty: 90 TABLET | Refills: 0 | Status: SHIPPED | OUTPATIENT
Start: 2025-06-20

## 2025-06-20 NOTE — TELEPHONE ENCOUNTER
Rx Refill Note  Requested Prescriptions     Pending Prescriptions Disp Refills    losartan (COZAAR) 100 MG tablet [Pharmacy Med Name: Losartan Potassium 100 MG Oral Tablet] 90 tablet 0     Sig: Take 1 tablet by mouth once daily      Last office visit with prescribing clinician: 3/18/2025   Last telemedicine visit with prescribing clinician: Visit date not found   Next office visit with prescribing clinician: 6/24/2025       Betty Jc  06/20/25, 08:11 EDT

## 2025-06-24 ENCOUNTER — OFFICE VISIT (OUTPATIENT)
Dept: INTERNAL MEDICINE | Facility: CLINIC | Age: 70
End: 2025-06-24
Payer: MEDICARE

## 2025-06-24 VITALS
DIASTOLIC BLOOD PRESSURE: 68 MMHG | HEIGHT: 70 IN | OXYGEN SATURATION: 98 % | HEART RATE: 83 BPM | BODY MASS INDEX: 29.01 KG/M2 | SYSTOLIC BLOOD PRESSURE: 104 MMHG | TEMPERATURE: 97.9 F | WEIGHT: 202.6 LBS

## 2025-06-24 DIAGNOSIS — Z12.5 SCREENING FOR PROSTATE CANCER: ICD-10-CM

## 2025-06-24 DIAGNOSIS — K21.9 GASTROESOPHAGEAL REFLUX DISEASE WITHOUT ESOPHAGITIS: ICD-10-CM

## 2025-06-24 DIAGNOSIS — J30.9 ALLERGIC RHINITIS, UNSPECIFIED SEASONALITY, UNSPECIFIED TRIGGER: ICD-10-CM

## 2025-06-24 DIAGNOSIS — R73.03 PREDIABETES: ICD-10-CM

## 2025-06-24 DIAGNOSIS — Z00.00 HEALTHCARE MAINTENANCE: ICD-10-CM

## 2025-06-24 DIAGNOSIS — I10 ESSENTIAL HYPERTENSION: ICD-10-CM

## 2025-06-24 DIAGNOSIS — E78.5 DYSLIPIDEMIA: ICD-10-CM

## 2025-06-24 DIAGNOSIS — Z00.00 MEDICARE ANNUAL WELLNESS VISIT, SUBSEQUENT: Primary | ICD-10-CM

## 2025-06-24 DIAGNOSIS — E03.9 HYPOTHYROIDISM, UNSPECIFIED TYPE: ICD-10-CM

## 2025-06-24 LAB — PSA SERPL-MCNC: 1.27 NG/ML (ref 0–4)

## 2025-06-24 RX ORDER — OMEPRAZOLE 40 MG/1
40 CAPSULE, DELAYED RELEASE ORAL DAILY
Qty: 90 CAPSULE | Refills: 3 | Status: SHIPPED | OUTPATIENT
Start: 2025-06-24

## 2025-06-24 RX ORDER — NEBIVOLOL 5 MG/1
5 TABLET ORAL DAILY
Qty: 30 TABLET | Refills: 6 | Status: SHIPPED | OUTPATIENT
Start: 2025-06-24

## 2025-06-24 RX ORDER — LOSARTAN POTASSIUM 100 MG/1
100 TABLET ORAL DAILY
Qty: 90 TABLET | Refills: 3 | Status: SHIPPED | OUTPATIENT
Start: 2025-06-24

## 2025-06-24 RX ORDER — AMLODIPINE BESYLATE 5 MG/1
5 TABLET ORAL DAILY
Qty: 90 TABLET | Refills: 3 | Status: SHIPPED | OUTPATIENT
Start: 2025-06-24

## 2025-06-24 RX ORDER — LEVOTHYROXINE SODIUM 137 MCG
137 TABLET ORAL DAILY
Qty: 90 TABLET | Refills: 3 | Status: SHIPPED | OUTPATIENT
Start: 2025-06-24

## 2025-06-24 RX ORDER — LORATADINE 10 MG/1
10 TABLET ORAL DAILY
Qty: 90 TABLET | Refills: 1 | Status: SHIPPED | OUTPATIENT
Start: 2025-06-24

## 2025-06-24 RX ORDER — MONTELUKAST SODIUM 10 MG/1
10 TABLET ORAL NIGHTLY
Qty: 90 TABLET | Refills: 1 | Status: SHIPPED | OUTPATIENT
Start: 2025-06-24

## 2025-06-24 RX ORDER — FLUTICASONE PROPIONATE 50 MCG
2 SPRAY, SUSPENSION (ML) NASAL DAILY
Qty: 18 G | Refills: 2 | Status: SHIPPED | OUTPATIENT
Start: 2025-06-24

## 2025-06-24 RX ORDER — HYDROCHLOROTHIAZIDE 25 MG/1
25 TABLET ORAL DAILY
Qty: 90 TABLET | Refills: 3 | Status: SHIPPED | OUTPATIENT
Start: 2025-06-24

## 2025-06-24 NOTE — ASSESSMENT & PLAN NOTE
Orders:    losartan (COZAAR) 100 MG tablet; Take 1 tablet by mouth Daily.    nebivolol (BYSTOLIC) 5 MG tablet; Take 1 tablet by mouth Daily.    amLODIPine (NORVASC) 5 MG tablet; Take 1 tablet by mouth Daily.    hydroCHLOROthiazide 25 MG tablet; Take 1 tablet by mouth Daily.    CBC & Differential    Comprehensive Metabolic Panel

## 2025-06-24 NOTE — PROGRESS NOTES
Subjective   The ABCs of the Annual Wellness Visit  Medicare Wellness Visit      Yohan Lawler Jr. is a 69 y.o. patient who presents for a Medicare Wellness Visit.    The following portions of the patient's history were reviewed and   updated as appropriate: allergies, current medications, past family history, past medical history, past social history, past surgical history, and problem list.    Compared to one year ago, the patient's physical   health is worse.  Compared to one year ago, the patient's mental   health is better.    Recent Hospitalizations:  He was not admitted to the hospital during the last year.     Current Medical Providers:  Patient Care Team:  Rusty Hickey MD as PCP - General (Family Medicine)    Outpatient Medications Prior to Visit   Medication Sig Dispense Refill    Apoaequorin (PREVAGEN PO) Take 1 tablet by mouth Daily.      aspirin 81 MG tablet Take 1 tablet by mouth Daily.      diphenhydrAMINE-APAP, sleep,  MG/30ML liquid Take  by mouth.      ibuprofen (ADVIL,MOTRIN) 800 MG tablet Take 1 tablet by mouth Every 8 (Eight) Hours As Needed for Mild Pain . 30 tablet 0    MULTIPLE VITAMINS-MINERALS ER PO Take 1 tablet by mouth Daily.      Omega-3 Fatty Acids (FISH OIL) 1000 MG capsule capsule Take 1 capsule by mouth Daily With Breakfast.      prednisoLONE acetate (PRED FORTE) 1 % ophthalmic suspension Administer 1 drop into the left eye Daily. 5 mL 1    amLODIPine (NORVASC) 5 MG tablet Take 1 tablet by mouth Daily. 90 tablet 0    fluticasone (Flonase) 50 MCG/ACT nasal spray Administer 2 sprays into the nostril(s) as directed by provider Daily. 18 g 2    hydroCHLOROthiazide 25 MG tablet Take 1 tablet by mouth Daily. 90 tablet 0    loratadine (Claritin) 10 MG tablet Take 1 tablet by mouth Daily. 90 tablet 1    losartan (COZAAR) 100 MG tablet Take 1 tablet by mouth once daily 90 tablet 0    montelukast (Singulair) 10 MG tablet Take 1 tablet by mouth Every Night. 90 tablet  "1    nebivolol (BYSTOLIC) 5 MG tablet Take 1 tablet by mouth Daily. 30 tablet 6    omeprazole (priLOSEC) 40 MG capsule Take 1 capsule by mouth Daily. 90 capsule 0    Synthroid 137 MCG tablet Take 1 tablet by mouth Daily. 90 tablet 3     No facility-administered medications prior to visit.     No opioid medication identified on active medication list. I have reviewed chart for other potential  high risk medication/s and harmful drug interactions in the elderly.      Aspirin is on active medication list. Aspirin use is indicated based on review of current medical condition/s. Pros and cons of this therapy have been discussed today. Benefits of this medication outweigh potential harm.  Patient has been encouraged to continue taking this medication.  .      Patient Active Problem List   Diagnosis    Essential hypertension    Hypothyroidism    GERD (gastroesophageal reflux disease)    Blood glucose elevated    Bursitis    Carpal tunnel syndrome    Tingling    Benign essential HTN    Personal history of colonic polyps    Polyp of colon    Hearing loss    Esophageal reflux    Encounter for screening for malignant neoplasm of colon     Advance Care Planning Advance Directive is not on file.  ACP discussion was held with the patient during this visit. Patient has an advance directive (not in EMR), copy requested.            Objective   Vitals:    06/24/25 0756   BP: 104/68   Pulse: 83   Temp: 97.9 °F (36.6 °C)   SpO2: 98%   Weight: 91.9 kg (202 lb 9.6 oz)   Height: 177.8 cm (70\")   PainSc: 0-No pain       Estimated body mass index is 29.07 kg/m² as calculated from the following:    Height as of this encounter: 177.8 cm (70\").    Weight as of this encounter: 91.9 kg (202 lb 9.6 oz).                Does the patient have evidence of cognitive impairment? No                                                                                               Health  Risk Assessment    Smoking Status:  Social History     Tobacco Use "   Smoking Status Never    Passive exposure: Never   Smokeless Tobacco Never     Alcohol Consumption:  Social History     Substance and Sexual Activity   Alcohol Use Not Currently    Alcohol/week: 12.0 standard drinks of alcohol       Fall Risk Screen  STEADI Fall Risk Assessment was completed, and patient is at HIGH risk for falls. Assessment completed on:2025    Depression Screening   Little interest or pleasure in doing things? Not at all   Feeling down, depressed, or hopeless? Not at all   PHQ-2 Total Score 0      Health Habits and Functional and Cognitive Screenin/24/2025     7:58 AM   Functional & Cognitive Status   Do you have difficulty preparing food and eating? No   Do you have difficulty bathing yourself, getting dressed or grooming yourself? No   Do you have difficulty using the toilet? No   Do you have difficulty moving around from place to place? No   Do you have trouble with steps or getting out of a bed or a chair? No   Current Diet Unhealthy Diet        Current Diet Comment eats too much sweets   Dental Exam Not up to date   Eye Exam Up to date   Exercise (times per week) 7 times per week   Current Exercises Include Stationary Bicycling/Spin Class   Do you need help using the phone?  No   Are you deaf or do you have serious difficulty hearing?  Yes   Do you need help to go to places out of walking distance? No   Do you need help shopping? No   Do you need help preparing meals?  No   Do you need help with housework?  No   Do you need help with laundry? No   Do you need help taking your medications? No   Do you need help managing money? No   Do you ever drive or ride in a car without wearing a seat belt? No   Have you felt unusual fatigue (could be tiredness), stress, anger or loneliness in the last month? No   Who do you live with? Spouse   If you need help, do you have trouble finding someone available to you? No   Have you been bothered in the last four weeks by sexual problems? No    Do you have difficulty concentrating, remembering or making decisions? No           Age-appropriate Screening Schedule:  Refer to the list below for future screening recommendations based on patient's age, sex and/or medical conditions. Orders for these recommended tests are listed in the plan section. The patient has been provided with a written plan.    Health Maintenance List  Health Maintenance   Topic Date Due    ZOSTER VACCINE (1 of 2) Never done    ANNUAL WELLNESS VISIT  04/05/2025    COVID-19 Vaccine (5 - 2024-25 season) 07/08/2025 (Originally 9/1/2024)    INFLUENZA VACCINE  07/01/2025    TDAP/TD VACCINES (3 - Td or Tdap) 08/05/2026    COLORECTAL CANCER SCREENING  02/28/2028    HEPATITIS C SCREENING  Completed    Pneumococcal Vaccine 50+  Completed                                                                                                                                                CMS Preventative Services Quick Reference  Risk Factors Identified During Encounter  None Identified    The above risks/problems have been discussed with the patient.  Pertinent information has been shared with the patient in the After Visit Summary.  An After Visit Summary and PPPS were made available to the patient.    Follow Up:   Next Medicare Wellness visit to be scheduled in 1 year.     Assessment & Plan  Medicare annual wellness visit, subsequent         Healthcare maintenance    Orders:    CBC & Differential    Comprehensive Metabolic Panel    Hemoglobin A1c    Thyroid Panel With TSH    Lipid Panel With LDL / HDL Ratio    Hypothyroidism, unspecified type    Orders:    Synthroid 137 MCG tablet; Take 1 tablet by mouth Daily.    Thyroid Panel With TSH    Essential hypertension      Orders:    losartan (COZAAR) 100 MG tablet; Take 1 tablet by mouth Daily.    nebivolol (BYSTOLIC) 5 MG tablet; Take 1 tablet by mouth Daily.    amLODIPine (NORVASC) 5 MG tablet; Take 1 tablet by mouth Daily.    hydroCHLOROthiazide 25 MG  tablet; Take 1 tablet by mouth Daily.    CBC & Differential    Comprehensive Metabolic Panel    Allergic rhinitis, unspecified seasonality, unspecified trigger    Orders:    montelukast (Singulair) 10 MG tablet; Take 1 tablet by mouth Every Night.    loratadine (Claritin) 10 MG tablet; Take 1 tablet by mouth Daily.    fluticasone (Flonase) 50 MCG/ACT nasal spray; Administer 2 sprays into the nostril(s) as directed by provider Daily.    Gastroesophageal reflux disease without esophagitis    Orders:    omeprazole (priLOSEC) 40 MG capsule; Take 1 capsule by mouth Daily.    Dyslipidemia    Orders:    Lipid Panel With LDL / HDL Ratio    Prediabetes    Orders:    Hemoglobin A1c    Screening for prostate cancer    Orders:    PSA Screen         Follow Up:   No follow-ups on file.

## 2025-06-25 LAB
ALBUMIN SERPL-MCNC: 4.8 G/DL (ref 3.5–5.2)
ALBUMIN/GLOB SERPL: 1.8 G/DL
ALP SERPL-CCNC: 75 U/L (ref 39–117)
ALT SERPL-CCNC: 49 U/L (ref 1–41)
AST SERPL-CCNC: 34 U/L (ref 1–40)
BASOPHILS # BLD AUTO: 0.02 10*3/MM3 (ref 0–0.2)
BASOPHILS NFR BLD AUTO: 0.3 % (ref 0–1.5)
BILIRUB SERPL-MCNC: 1 MG/DL (ref 0–1.2)
BUN SERPL-MCNC: 6 MG/DL (ref 8–23)
BUN/CREAT SERPL: 8.2 (ref 7–25)
CALCIUM SERPL-MCNC: 10.1 MG/DL (ref 8.6–10.5)
CHLORIDE SERPL-SCNC: 93 MMOL/L (ref 98–107)
CHOLEST SERPL-MCNC: 165 MG/DL (ref 0–200)
CO2 SERPL-SCNC: 24 MMOL/L (ref 22–29)
CREAT SERPL-MCNC: 0.73 MG/DL (ref 0.76–1.27)
EGFRCR SERPLBLD CKD-EPI 2021: 97.3 ML/MIN/1.73
EOSINOPHIL # BLD AUTO: 0.1 10*3/MM3 (ref 0–0.4)
EOSINOPHIL NFR BLD AUTO: 1.4 % (ref 0.3–6.2)
ERYTHROCYTE [DISTWIDTH] IN BLOOD BY AUTOMATED COUNT: 12.8 % (ref 12.3–15.4)
FT4I SERPL CALC-MCNC: 2.9 (ref 1.2–4.9)
GLOBULIN SER CALC-MCNC: 2.7 GM/DL
GLUCOSE SERPL-MCNC: 111 MG/DL (ref 65–99)
HBA1C MFR BLD: 5.5 % (ref 4.8–5.6)
HCT VFR BLD AUTO: 44.9 % (ref 37.5–51)
HDLC SERPL-MCNC: 39 MG/DL (ref 40–60)
HGB BLD-MCNC: 15.1 G/DL (ref 13–17.7)
IMM GRANULOCYTES # BLD AUTO: 0.03 10*3/MM3 (ref 0–0.05)
IMM GRANULOCYTES NFR BLD AUTO: 0.4 % (ref 0–0.5)
LDLC SERPL CALC-MCNC: 101 MG/DL (ref 0–100)
LDLC/HDLC SERPL: 2.51 {RATIO}
LYMPHOCYTES # BLD AUTO: 1.34 10*3/MM3 (ref 0.7–3.1)
LYMPHOCYTES NFR BLD AUTO: 18.6 % (ref 19.6–45.3)
MCH RBC QN AUTO: 31.4 PG (ref 26.6–33)
MCHC RBC AUTO-ENTMCNC: 33.6 G/DL (ref 31.5–35.7)
MCV RBC AUTO: 93.3 FL (ref 79–97)
MONOCYTES # BLD AUTO: 0.46 10*3/MM3 (ref 0.1–0.9)
MONOCYTES NFR BLD AUTO: 6.4 % (ref 5–12)
NEUTROPHILS # BLD AUTO: 5.27 10*3/MM3 (ref 1.7–7)
NEUTROPHILS NFR BLD AUTO: 72.9 % (ref 42.7–76)
NRBC BLD AUTO-RTO: 0 /100 WBC (ref 0–0.2)
PLATELET # BLD AUTO: 346 10*3/MM3 (ref 140–450)
POTASSIUM SERPL-SCNC: 3.8 MMOL/L (ref 3.5–5.2)
PROT SERPL-MCNC: 7.5 G/DL (ref 6–8.5)
RBC # BLD AUTO: 4.81 10*6/MM3 (ref 4.14–5.8)
SODIUM SERPL-SCNC: 132 MMOL/L (ref 136–145)
T3RU NFR SERPL: 29 % (ref 24–39)
T4 SERPL-MCNC: 10.1 UG/DL (ref 4.5–12)
TRIGL SERPL-MCNC: 141 MG/DL (ref 0–150)
TSH SERPL DL<=0.005 MIU/L-ACNC: 1.05 UIU/ML (ref 0.45–4.5)
VLDLC SERPL CALC-MCNC: 25 MG/DL (ref 5–40)
WBC # BLD AUTO: 7.22 10*3/MM3 (ref 3.4–10.8)

## 2025-06-29 ENCOUNTER — RESULTS FOLLOW-UP (OUTPATIENT)
Dept: INTERNAL MEDICINE | Facility: CLINIC | Age: 70
End: 2025-06-29
Payer: MEDICARE

## 2025-06-29 DIAGNOSIS — E87.1 HYPONATREMIA: Primary | ICD-10-CM

## 2025-06-29 RX ORDER — SODIUM CHLORIDE 1 G/1
1 TABLET ORAL 2 TIMES DAILY
Qty: 14 TABLET | Refills: 0 | Status: SHIPPED | OUTPATIENT
Start: 2025-06-29 | End: 2025-07-06

## 2025-06-29 NOTE — PROGRESS NOTES
"Chief Complaint  Hypertension and Medicare Wellness-subsequent    Subjective          Yohan Lawler Jr. presents to [facility]  History of Present Illness  The patient came in today to discuss his back pain, hypothyroidism, blood pressure, allergies, heartburn, and concerns about his prostate health.    He hurt his back while helping his wife after she slipped on a rug. An orthopedist found two old fractures in his vertebrae and a curve in his spine. He has an MRI scheduled for July 11th and an upcoming appointment with pain management at [facility], though he doesn't remember the exact date. He has seen several doctors and had an epidural injection before but stopped because it made his pain worse.    For hypothyroidism, he takes Synthroid 137 mcg daily.    His blood pressure is managed with losartan 100 mg daily, Bystolic 5 mg daily, amlodipine 5 mg daily, and hydrochlorothiazide 25 mg daily.    He has severe allergies and uses Flonase nasal spray, takes Claritin in the morning, and Singulair at night. He reports no issues with these medications.    For heartburn, he takes Prilosec (omeprazole).    He is concerned about his prostate health and wants it checked during his blood work today.    SOCIAL HISTORY  He does not use alcohol or drugs.    Objective   Vital Signs:   /68   Pulse 83   Temp 97.9 °F (36.6 °C)   Ht 177.8 cm (70\")   Wt 91.9 kg (202 lb 9.6 oz)   SpO2 98%   BMI 29.07 kg/m²     Physical Exam  Vitals and nursing note reviewed.   Constitutional:       Appearance: He is well-developed.   HENT:      Head: Normocephalic and atraumatic.   Musculoskeletal:      Cervical back: Normal range of motion and neck supple.   Neurological:      Mental Status: He is alert and oriented to person, place, and time.   Psychiatric:         Behavior: Behavior normal.         Physical Exam       Result Review :                 Assessment and Plan    Diagnoses and all " orders for this visit:    1. Medicare annual wellness visit, subsequent (Primary)    2. Healthcare maintenance  -     CBC & Differential  -     Comprehensive Metabolic Panel  -     Hemoglobin A1c  -     Thyroid Panel With TSH  -     Lipid Panel With LDL / HDL Ratio    3. Hypothyroidism, unspecified type  Assessment & Plan:    Orders:    Synthroid 137 MCG tablet; Take 1 tablet by mouth Daily.    Thyroid Panel With TSH      Orders:  -     Synthroid 137 MCG tablet; Take 1 tablet by mouth Daily.  Dispense: 90 tablet; Refill: 3  -     Thyroid Panel With TSH    4. Essential hypertension  Assessment & Plan:      Orders:    losartan (COZAAR) 100 MG tablet; Take 1 tablet by mouth Daily.    nebivolol (BYSTOLIC) 5 MG tablet; Take 1 tablet by mouth Daily.    amLODIPine (NORVASC) 5 MG tablet; Take 1 tablet by mouth Daily.    hydroCHLOROthiazide 25 MG tablet; Take 1 tablet by mouth Daily.    CBC & Differential    Comprehensive Metabolic Panel      Orders:  -     losartan (COZAAR) 100 MG tablet; Take 1 tablet by mouth Daily.  Dispense: 90 tablet; Refill: 3  -     nebivolol (BYSTOLIC) 5 MG tablet; Take 1 tablet by mouth Daily.  Dispense: 30 tablet; Refill: 6  -     amLODIPine (NORVASC) 5 MG tablet; Take 1 tablet by mouth Daily.  Dispense: 90 tablet; Refill: 3  -     hydroCHLOROthiazide 25 MG tablet; Take 1 tablet by mouth Daily.  Dispense: 90 tablet; Refill: 3  -     CBC & Differential  -     Comprehensive Metabolic Panel    5. Allergic rhinitis, unspecified seasonality, unspecified trigger  -     montelukast (Singulair) 10 MG tablet; Take 1 tablet by mouth Every Night.  Dispense: 90 tablet; Refill: 1  -     loratadine (Claritin) 10 MG tablet; Take 1 tablet by mouth Daily.  Dispense: 90 tablet; Refill: 1  -     fluticasone (Flonase) 50 MCG/ACT nasal spray; Administer 2 sprays into the nostril(s) as directed by provider Daily.  Dispense: 18 g; Refill: 2    6. Gastroesophageal reflux disease without esophagitis  Assessment &  Plan:    Orders:    omeprazole (priLOSEC) 40 MG capsule; Take 1 capsule by mouth Daily.      Orders:  -     omeprazole (priLOSEC) 40 MG capsule; Take 1 capsule by mouth Daily.  Dispense: 90 capsule; Refill: 3    7. Dyslipidemia  -     Lipid Panel With LDL / HDL Ratio    8. Prediabetes  -     Hemoglobin A1c    9. Screening for prostate cancer  -     PSA Screen      Assessment & Plan  1. Back pain.  - Reports experiencing back pain after helping his wife when she slipped on a rug.  - Previous x-rays revealed two fractured vertebrae and a curved spine.  - MRI scheduled for 07/11/2025 to further evaluate his condition.  - Upcoming appointment with pain management at [facility] in July 2025.    2. Hypothyroidism.  - Currently taking Synthroid 137 mcg daily.  - No reported issues with medication.  - Condition appears stable with current treatment.  - Continue current dosage and monitor thyroid function.    3. Blood pressure management.  - Currently on losartan 100 mg daily, Bystolic 5 mg daily, amlodipine 5 mg daily, and hydrochlorothiazide 25 mg daily.  - No reported issues with medications.  - Blood pressure appears to be well-managed with current regimen.  - Continue current medications and monitor blood pressure regularly.    4. Allergic rhinitis.  - Using Flonase nasal spray, taking Claritin in the morning, and Singulair at night.  - Reports effective management of symptoms with current medications.  - No reported issues with medications.  - Continue current treatment and monitor for any changes in symptoms.    5. Heartburn.  - Taking omeprazole for heartburn.  - No reported issues with medication.  - Condition appears stable with current treatment.  - Continue current dosage and monitor for any changes in symptoms.    6. Prostate health.  - PSA test will be added to blood work today to assess prostate health.  - Patient consented to the addition of PSA test.  - Review results once available and discuss further  management if necessary.  - Monitor prostate health regularly.    Follow Up   No follow-ups on file.  Patient was given instructions and counseling regarding his condition or for health maintenance advice. Please see specific information pulled into the AVS if appropriate.           Patient or patient representative verbalized consent for the use of Ambient Listening during the visit with  Rusty Hickey MD for chart documentation. 6/29/2025  11:09 EDT

## 2025-06-30 NOTE — PROGRESS NOTES
Please inform the patient of the following abnormal results.  Patient does have some hyponatremia, I would like him to do some salt tablets I sent in to his pharmacy we will do twice a day for 7 days, and then would like him to repeat a CMP to check the salt levels.  His ALT also appears to be slightly elevated, we will continue to monitor.

## 2025-07-03 NOTE — TELEPHONE ENCOUNTER
Spoke with patient to verify he has seen his lab results in [facility]. Scheduled him for lab follow up 07/10 at 9 AM.

## 2025-07-11 LAB
ALBUMIN SERPL-MCNC: 4.5 G/DL (ref 3.5–5.2)
ALBUMIN/GLOB SERPL: 1.6 G/DL
ALP SERPL-CCNC: 67 U/L (ref 39–117)
ALT SERPL-CCNC: 43 U/L (ref 1–41)
AST SERPL-CCNC: 33 U/L (ref 1–40)
BILIRUB SERPL-MCNC: 0.7 MG/DL (ref 0–1.2)
BUN SERPL-MCNC: 7 MG/DL (ref 8–23)
BUN/CREAT SERPL: 8.9 (ref 7–25)
CALCIUM SERPL-MCNC: 9.7 MG/DL (ref 8.6–10.5)
CHLORIDE SERPL-SCNC: 97 MMOL/L (ref 98–107)
CO2 SERPL-SCNC: 25.2 MMOL/L (ref 22–29)
CREAT SERPL-MCNC: 0.79 MG/DL (ref 0.76–1.27)
EGFRCR SERPLBLD CKD-EPI 2021: 95 ML/MIN/1.73
GLOBULIN SER CALC-MCNC: 2.8 GM/DL
GLUCOSE SERPL-MCNC: 109 MG/DL (ref 65–99)
POTASSIUM SERPL-SCNC: 3.6 MMOL/L (ref 3.5–5.2)
PROT SERPL-MCNC: 7.3 G/DL (ref 6–8.5)
SODIUM SERPL-SCNC: 133 MMOL/L (ref 136–145)

## 2025-07-25 ENCOUNTER — TELEPHONE (OUTPATIENT)
Dept: PAIN MEDICINE | Facility: CLINIC | Age: 70
End: 2025-07-25
Payer: MEDICARE

## 2025-07-25 NOTE — TELEPHONE ENCOUNTER
Called patient and left a voicemail. Reminded him of his new patient appt Monday at 10:00 am with Jong MURRAY. Asked him to arrive 10-15 min early for paperwork.

## 2025-07-28 ENCOUNTER — TRANSCRIBE ORDERS (OUTPATIENT)
Dept: SURGERY | Facility: SURGERY CENTER | Age: 70
End: 2025-07-28
Payer: MEDICARE

## 2025-07-28 ENCOUNTER — OFFICE VISIT (OUTPATIENT)
Dept: PAIN MEDICINE | Facility: CLINIC | Age: 70
End: 2025-07-28
Payer: MEDICARE

## 2025-07-28 ENCOUNTER — PREP FOR SURGERY (OUTPATIENT)
Dept: SURGERY | Facility: SURGERY CENTER | Age: 70
End: 2025-07-28
Payer: MEDICARE

## 2025-07-28 VITALS
HEART RATE: 86 BPM | WEIGHT: 218 LBS | BODY MASS INDEX: 31.21 KG/M2 | HEIGHT: 70 IN | TEMPERATURE: 97.3 F | DIASTOLIC BLOOD PRESSURE: 86 MMHG | OXYGEN SATURATION: 97 % | SYSTOLIC BLOOD PRESSURE: 114 MMHG

## 2025-07-28 DIAGNOSIS — M47.816 LUMBAR FACET ARTHROPATHY: Primary | ICD-10-CM

## 2025-07-28 DIAGNOSIS — Z41.9 SURGERY, ELECTIVE: Primary | ICD-10-CM

## 2025-07-28 DIAGNOSIS — M51.372 DEGENERATION OF INTERVERTEBRAL DISC OF LUMBOSACRAL REGION WITH DISCOGENIC BACK PAIN AND LOWER EXTREMITY PAIN: ICD-10-CM

## 2025-07-28 PROCEDURE — 1125F AMNT PAIN NOTED PAIN PRSNT: CPT | Performed by: PHYSICIAN ASSISTANT

## 2025-07-28 PROCEDURE — 1159F MED LIST DOCD IN RCRD: CPT | Performed by: PHYSICIAN ASSISTANT

## 2025-07-28 PROCEDURE — 3079F DIAST BP 80-89 MM HG: CPT | Performed by: PHYSICIAN ASSISTANT

## 2025-07-28 PROCEDURE — 1160F RVW MEDS BY RX/DR IN RCRD: CPT | Performed by: PHYSICIAN ASSISTANT

## 2025-07-28 PROCEDURE — 99204 OFFICE O/P NEW MOD 45 MIN: CPT | Performed by: PHYSICIAN ASSISTANT

## 2025-07-28 PROCEDURE — 3074F SYST BP LT 130 MM HG: CPT | Performed by: PHYSICIAN ASSISTANT

## 2025-07-28 RX ORDER — DIAZEPAM 5 MG/1
10 TABLET ORAL ONCE
OUTPATIENT
Start: 2025-07-28 | End: 2025-07-28

## 2025-07-28 NOTE — PROGRESS NOTES
CHIEF COMPLAINT    Back pain    Subjective   Yohan Lawler Jr. is a 69 y.o. male.   He presents to the office for initial evaluation of back pain. He was referred here by АЛЕКСАНДР Ayala.  This patient reports a longstanding history of lumbar spine pain with intermittent right lower extremity pain that had previously responded well to therapeutic L5/S1 LESI's.  He had increased pain approximately 6 years ago when he fell off of a ladder and fractured 2 vertebrae.  This patient illustrates primarily right sided lumbar spine pain which is referred into the paravertebral spinal muscles and occasionally radiates into the posterior aspect of the right thigh terminating at the calf.  He will have intermittent numbness and tingling within the right lower extremity but states that overall his back pain is significantly greater than his leg pain.  Pain is described as a continuous throbbing sensation which is usually worse throughout the day and is also exacerbated by bending, twisting, going grocery store shopping, doing laundry or walking/standing for prolonged periods of time.    He denies any history of cervical or lumbar spine surgery.    The patient has undergone multiple L5/S1 intralaminar LESI's with a right paramedian approach that which was working significantly until the most recent epidural that was performed in October 2024.    The patient has completed a formalized course of physical therapy and continues with a physician guided HEP that he performs twice daily.  He also does 30 minutes of cardio on a exercise bike daily as well.    Pain today 4/10 VAS in severity.      Back Pain  Chronicity:  Chronic  Onset:  More than 1 year ago  Frequency:  Constantly  Progression since onset:  Unchanged  Pain location:  Lumbar spine  Quality: throbbing.  Radiates to:  Right buttock and right thigh  Pain-numeric:  4/10  Pain severity:  Moderate  Pain is:  Worse during the day  Aggravated by:  Standing,  bending and twisting (walking/grocery shopping/laundry)  Associated symptoms: leg pain (occasional), numbness and tingling    Associated symptoms: no fever and no weakness    Risk factors:  Lack of exercise  Treatments tried:  Physical therapy, heat and home exercises  Improvement on treatment:  Mild       PEG Assessment   What number best describes your pain on average in the past week?4  What number best describes how, during the past week, pain has interfered with your enjoyment of life?5  What number best describes how, during the past week, pain has interfered with your general activity?  5        Current Outpatient Medications:     amLODIPine (NORVASC) 5 MG tablet, Take 1 tablet by mouth Daily., Disp: 90 tablet, Rfl: 3    Apoaequorin (PREVAGEN PO), Take 1 tablet by mouth Daily., Disp: , Rfl:     aspirin 81 MG tablet, Take 1 tablet by mouth Daily., Disp: , Rfl:     diphenhydrAMINE-APAP, sleep,  MG/30ML liquid, Take  by mouth., Disp: , Rfl:     fluticasone (Flonase) 50 MCG/ACT nasal spray, Administer 2 sprays into the nostril(s) as directed by provider Daily., Disp: 18 g, Rfl: 2    hydroCHLOROthiazide 25 MG tablet, Take 1 tablet by mouth Daily., Disp: 90 tablet, Rfl: 3    ibuprofen (ADVIL,MOTRIN) 800 MG tablet, Take 1 tablet by mouth Every 8 (Eight) Hours As Needed for Mild Pain ., Disp: 30 tablet, Rfl: 0    loratadine (Claritin) 10 MG tablet, Take 1 tablet by mouth Daily., Disp: 90 tablet, Rfl: 1    losartan (COZAAR) 100 MG tablet, Take 1 tablet by mouth Daily., Disp: 90 tablet, Rfl: 3    montelukast (Singulair) 10 MG tablet, Take 1 tablet by mouth Every Night., Disp: 90 tablet, Rfl: 1    MULTIPLE VITAMINS-MINERALS ER PO, Take 1 tablet by mouth Daily., Disp: , Rfl:     nebivolol (BYSTOLIC) 5 MG tablet, Take 1 tablet by mouth Daily., Disp: 30 tablet, Rfl: 6    Omega-3 Fatty Acids (FISH OIL) 1000 MG capsule capsule, Take 1 capsule by mouth Daily With Breakfast., Disp: , Rfl:     omeprazole (priLOSEC) 40 MG  capsule, Take 1 capsule by mouth Daily., Disp: 90 capsule, Rfl: 3    prednisoLONE acetate (PRED FORTE) 1 % ophthalmic suspension, Administer 1 drop into the left eye Daily., Disp: 5 mL, Rfl: 1    Synthroid 137 MCG tablet, Take 1 tablet by mouth Daily., Disp: 90 tablet, Rfl: 3    The following portions of the patient's history were reviewed and updated as appropriate: allergies, current medications, past family history, past medical history, past social history, past surgical history, and problem list.      REVIEW OF PERTINENT MEDICAL DATA    Per review of [facility] note dated 6/23/2025:  IMAGING:      Indication: pain related symptoms,  Views: 2V AP&LAT flexion-extension lumbar  Findings: No obvious dynamic instability between flexion-extension, 20 mm anterolisthesis L5 on S1, advanced degenerative disc and facet changes, chronic L3 and L4 compression deformities  Comparison views: Similar to 11/15/2022        Official radiology interpretation will follow and be available in the patient medical records. Patient will be notified of any pertinent discrepancies.     Addendum 06/23/2025:.  Superior L3 endplate compression deformity has developed  within the interim with 30% volume loss. L4 compression deformity  remains stable. Grade 2 anterolisthesis of L5 on S1 as before, stable in the neutral, flexion and extension views. There is disc space loss at L5-S1. Lower lumbar facet hypertrophy. The remainder is unremarkable    Review of Systems   Constitutional:  Negative for chills and fever.   Respiratory:  Negative for cough and shortness of breath.    Gastrointestinal:  Negative for constipation and diarrhea.   Genitourinary:  Negative for difficulty urinating.   Musculoskeletal:  Positive for back pain.   Neurological:  Positive for tingling, light-headedness (when standing up) and numbness. Negative for weakness.   Psychiatric/Behavioral:  Negative for suicidal ideas.        I have reviewed and  "confirmed the accuracy of the ROS as documented by the MA/LPN/RN RHONDA Mosley    Vitals:    07/28/25 1004   BP: 114/86   BP Location: Left arm   Patient Position: Sitting   Pulse: 86   Temp: 97.3 °F (36.3 °C)   TempSrc: Temporal   SpO2: 97%   Weight: 98.9 kg (218 lb)   Height: 177.8 cm (70\")   PainSc: 4    PainLoc: Back         Objective       Physical Exam  Vitals and nursing note reviewed.   Constitutional:       Appearance: Normal appearance. He is obese.   HENT:      Head: Normocephalic.   Pulmonary:      Effort: Pulmonary effort is normal.   Musculoskeletal:      Lumbar back: Tenderness (Moderate tenderness to palpation within the overlying right lumbar facet joint spaces) present. Decreased range of motion (Pain is creased with lumbar extension and lateral bending).        Back:    Skin:     General: Skin is warm and dry.   Neurological:      General: No focal deficit present.      Mental Status: He is alert and oriented to person, place, and time.      Cranial Nerves: Cranial nerves 2-12 are intact.      Sensory: Sensation is intact.      Motor: Motor function is intact.      Gait: Gait is intact.      Deep Tendon Reflexes:      Reflex Scores:       Patellar reflexes are 1+ on the right side and 1+ on the left side.  Psychiatric:         Mood and Affect: Mood normal.         Behavior: Behavior normal.         Thought Content: Thought content normal.         Judgment: Judgment normal.         Assessment & Plan   Diagnoses and all orders for this visit:    1. Lumbar facet arthropathy (Primary)    2. Degeneration of intervertebral disc of lumbosacral region with discogenic back pain and lower extremity pain        --- Yohan Lawler Jr. reports a pain score of 4.  Given his pain assessment as noted, treatment options were discussed and the following options were decided upon as a follow-up plan to address the patient's pain: continuation of current treatment plan for pain, patient not " interested in pharmacological measures, and use of non-medical modalities (ice, heat, stretching and/or behavior modifications).    PHQ-2 Depression Screening  Little interest or pleasure in doing things? Not at all   Feeling down, depressed, or hopeless? Not at all   PHQ-2 Total Score 0     Tobacco Use: Low Risk  (7/28/2025)    Patient History     Smoking Tobacco Use: Never     Smokeless Tobacco Use: Never     Passive Exposure: Never         --- Based on the patient's physical exam and x-rays obtained at orthopedic appointment we will proceed with #1 diagnostic right L3-5 medial branch block under fluoroscopy guidance with plan to proceed to radiofrequency ablation of favorable response is obtained.  Risk and benefits of the procedure been discussed with the patient and all questions have been addressed  --- Follow-up 1 week after undergoing injective therapy         Pain / Disability Scale    The scale used for measurement of pain and/or disability for this patient was the [place] back pain disability scale.  The score was 30 on 07/28/2025          Diagnostic Facet Joint Procedure:   MBB (valium)    The first diagnostic facet joint procedure is considered medically reasonable and necessary for the diagnosis and treatment of chronic pain for this patient due to the patient meeting all of the following criteria:    - 1. Moderate to severe chronic neck or low back pain, predominantly axial, that causes functional deficit measured on pain or disability scale.  - 2. Pain present for minimum of 3 months with documented failure to respond to noninvasive conservative management (as tolerated)  - 3. Absence of untreated radiculopathy or neurogenic claudication (except for radiculopathy caused by facet joint synovial cyst)  - 4. There is no non-facet pathology per clinical assessment or radiology studies that could explain the source of the patient’s pain, including but not limited to fracture, tumor, infection, or  significant deformity.    The confirmatory diagnostic facet joint procedure is considered medically reasonable and necessary for the diagnosis and treatment of chronic pain for this patient due to the patient meeting all of the following criteria:    - 1. Moderate to severe chronic neck or low back pain, predominantly axial, that causes functional deficit measured on pain or disability scale.  - 2. Pain present for minimum of 3 months with documented failure to respond to noninvasive conservative management (as tolerated)  - 3. Absence of untreated radiculopathy or neurogenic claudication (except for radiculopathy caused by facet joint synovial cyst)  - 4. There is no non-facet pathology per clinical assessment or radiology studies that could explain the source of the patient’s pain, including but not limited to fracture, tumor, infection, or significant deformity.    The patient has also shown a consistent positive response of at least 80% relief of primary (index) pain (with the duration of relief being consistent with the agent used).    Discussed with the patient that sedation is optional for this procedure.  The sedation offered is called conscious sedation which is different from general anesthesia that is utilized in surgical procedures. The dosing of the sedation is determined by the physician and they will be monitored throughout the procedure. With conscious sedation it is possible to remember parts or all of the procedure, this is normal. They will need to have a  with them as driving is prohibited following conscious sedation.     NPO instructions for conscious sedation:  --- Do not eat 8 hours prior to the procedure.   --- Do not drink any dairy or citrus 4 hours prior to the procedure.   --- Do not drink anything, including clear liquids, 2 hours prior to procedure.     If the NPO instructions are not followed then the procedure may be performed without sedation or the procedure will need to be  rescheduled.         JOHN PAUL REPORT      JOHN PAUL report has been reviewed and scanned into the patient's chart.    As the clinician, I personally reviewed the JOHN PAUL from 7/28/2025 while the patient was in the office today.        Dictated utilizing Dragon dictation.

## 2025-08-03 ENCOUNTER — HOSPITAL ENCOUNTER (OUTPATIENT)
Facility: HOSPITAL | Age: 70
Discharge: HOME | End: 2025-08-03
Admitting: NURSE PRACTITIONER
Payer: MEDICARE

## 2025-08-03 DIAGNOSIS — M51.361 DEGENERATION OF INTERVERTEBRAL DISC OF LUMBAR REGION WITH LOWER EXTREMITY PAIN: ICD-10-CM

## 2025-08-03 PROCEDURE — 72148 MRI LUMBAR SPINE W/O DYE: CPT

## 2025-08-09 DIAGNOSIS — I10 ESSENTIAL HYPERTENSION: ICD-10-CM

## 2025-08-09 DIAGNOSIS — E03.9 HYPOTHYROIDISM, UNSPECIFIED TYPE: ICD-10-CM

## 2025-08-11 DIAGNOSIS — J30.9 ALLERGIC RHINITIS, UNSPECIFIED SEASONALITY, UNSPECIFIED TRIGGER: ICD-10-CM

## 2025-08-11 RX ORDER — PREDNISOLONE ACETATE 10 MG/ML
1 SUSPENSION/ DROPS OPHTHALMIC DAILY
Qty: 5 ML | Refills: 1 | Status: SHIPPED | OUTPATIENT
Start: 2025-08-11 | End: 2025-08-16 | Stop reason: SDUPTHER

## 2025-08-11 RX ORDER — LEVOTHYROXINE SODIUM 137 MCG
137 TABLET ORAL DAILY
Qty: 90 TABLET | Refills: 3 | Status: SHIPPED | OUTPATIENT
Start: 2025-08-11

## 2025-08-11 RX ORDER — HYDROCHLOROTHIAZIDE 25 MG/1
25 TABLET ORAL DAILY
Qty: 90 TABLET | Refills: 3 | Status: SHIPPED | OUTPATIENT
Start: 2025-08-11

## 2025-08-11 RX ORDER — FLUTICASONE PROPIONATE 50 MCG
2 SPRAY, SUSPENSION (ML) NASAL DAILY
Qty: 18 G | Refills: 2 | Status: SHIPPED | OUTPATIENT
Start: 2025-08-11

## 2025-08-13 ENCOUNTER — HOSPITAL ENCOUNTER (OUTPATIENT)
Facility: SURGERY CENTER | Age: 70
Setting detail: HOSPITAL OUTPATIENT SURGERY
Discharge: HOME | End: 2025-08-13
Attending: ANESTHESIOLOGY | Admitting: ANESTHESIOLOGY
Payer: MEDICARE

## 2025-08-13 ENCOUNTER — HOSPITAL ENCOUNTER (OUTPATIENT)
Dept: GENERAL RADIOLOGY | Facility: SURGERY CENTER | Age: 70
Setting detail: HOSPITAL OUTPATIENT SURGERY
End: 2025-08-13
Payer: MEDICARE

## 2025-08-13 VITALS
SYSTOLIC BLOOD PRESSURE: 119 MMHG | DIASTOLIC BLOOD PRESSURE: 93 MMHG | BODY MASS INDEX: 30.06 KG/M2 | TEMPERATURE: 97.7 F | HEART RATE: 86 BPM | OXYGEN SATURATION: 92 % | WEIGHT: 210 LBS | HEIGHT: 70 IN | RESPIRATION RATE: 16 BRPM

## 2025-08-13 DIAGNOSIS — M47.816 LUMBAR FACET ARTHROPATHY: ICD-10-CM

## 2025-08-13 DIAGNOSIS — Z41.9 SURGERY, ELECTIVE: ICD-10-CM

## 2025-08-13 PROCEDURE — 25010000002 LIDOCAINE PF 1% 1 % SOLUTION 5 ML VIAL: Performed by: ANESTHESIOLOGY

## 2025-08-13 PROCEDURE — 64494 INJ PARAVERT F JNT L/S 2 LEV: CPT | Performed by: ANESTHESIOLOGY

## 2025-08-13 PROCEDURE — 77002 NEEDLE LOCALIZATION BY XRAY: CPT

## 2025-08-13 PROCEDURE — 25510000001 IOPAMIDOL 61 % SOLUTION 30 ML VIAL: Performed by: ANESTHESIOLOGY

## 2025-08-13 PROCEDURE — 25010000002 BUPIVACAINE (PF) 0.5 % SOLUTION 10 ML VIAL: Performed by: ANESTHESIOLOGY

## 2025-08-13 PROCEDURE — 76000 FLUOROSCOPY <1 HR PHYS/QHP: CPT

## 2025-08-13 PROCEDURE — 64493 INJ PARAVERT F JNT L/S 1 LEV: CPT | Performed by: ANESTHESIOLOGY

## 2025-08-13 RX ORDER — DIAZEPAM 5 MG/1
10 TABLET ORAL ONCE
Status: COMPLETED | OUTPATIENT
Start: 2025-08-13 | End: 2025-08-13

## 2025-08-13 RX ADMIN — DIAZEPAM 10 MG: 5 TABLET ORAL at 13:23

## 2025-08-18 RX ORDER — PREDNISOLONE ACETATE 10 MG/ML
1 SUSPENSION/ DROPS OPHTHALMIC DAILY
Qty: 5 ML | Refills: 1 | Status: SHIPPED | OUTPATIENT
Start: 2025-08-18

## 2025-08-20 ENCOUNTER — OFFICE VISIT (OUTPATIENT)
Dept: PAIN MEDICINE | Facility: CLINIC | Age: 70
End: 2025-08-20
Payer: MEDICARE

## 2025-08-20 ENCOUNTER — PREP FOR SURGERY (OUTPATIENT)
Dept: SURGERY | Facility: SURGERY CENTER | Age: 70
End: 2025-08-20
Payer: MEDICARE

## 2025-08-20 VITALS
SYSTOLIC BLOOD PRESSURE: 118 MMHG | RESPIRATION RATE: 20 BRPM | BODY MASS INDEX: 29.66 KG/M2 | HEIGHT: 70 IN | HEART RATE: 83 BPM | OXYGEN SATURATION: 97 % | DIASTOLIC BLOOD PRESSURE: 77 MMHG | TEMPERATURE: 97.1 F | WEIGHT: 207.2 LBS

## 2025-08-20 DIAGNOSIS — M54.41 CHRONIC RIGHT-SIDED LOW BACK PAIN WITH RIGHT-SIDED SCIATICA: ICD-10-CM

## 2025-08-20 DIAGNOSIS — M47.816 LUMBAR FACET ARTHROPATHY: Primary | ICD-10-CM

## 2025-08-20 DIAGNOSIS — G89.29 CHRONIC RIGHT-SIDED LOW BACK PAIN WITH RIGHT-SIDED SCIATICA: ICD-10-CM

## 2025-08-20 DIAGNOSIS — M51.372 DEGENERATION OF INTERVERTEBRAL DISC OF LUMBOSACRAL REGION WITH DISCOGENIC BACK PAIN AND LOWER EXTREMITY PAIN: ICD-10-CM

## 2025-08-20 PROBLEM — M54.50 CHRONIC BILATERAL LOW BACK PAIN: Status: ACTIVE | Noted: 2025-08-20

## 2025-08-20 RX ORDER — SODIUM CHLORIDE 0.9 % (FLUSH) 0.9 %
10 SYRINGE (ML) INJECTION AS NEEDED
OUTPATIENT
Start: 2025-08-20

## 2025-08-20 RX ORDER — SODIUM CHLORIDE 0.9 % (FLUSH) 0.9 %
10 SYRINGE (ML) INJECTION EVERY 12 HOURS SCHEDULED
OUTPATIENT
Start: 2025-08-20

## 2025-08-22 ENCOUNTER — TRANSCRIBE ORDERS (OUTPATIENT)
Dept: SURGERY | Facility: SURGERY CENTER | Age: 70
End: 2025-08-22
Payer: MEDICARE

## 2025-08-22 DIAGNOSIS — Z41.9 SURGERY, ELECTIVE: Primary | ICD-10-CM

## 2025-08-26 ENCOUNTER — HOSPITAL ENCOUNTER (OUTPATIENT)
Dept: GENERAL RADIOLOGY | Facility: SURGERY CENTER | Age: 70
End: 2025-08-26
Payer: MEDICARE

## 2025-08-26 ENCOUNTER — HOSPITAL ENCOUNTER (OUTPATIENT)
Facility: SURGERY CENTER | Age: 70
Setting detail: HOSPITAL OUTPATIENT SURGERY
Discharge: HOME | End: 2025-08-26
Attending: ANESTHESIOLOGY | Admitting: ANESTHESIOLOGY
Payer: MEDICARE

## 2025-08-26 VITALS
BODY MASS INDEX: 29.49 KG/M2 | WEIGHT: 206 LBS | HEIGHT: 70 IN | TEMPERATURE: 97.8 F | OXYGEN SATURATION: 95 % | SYSTOLIC BLOOD PRESSURE: 116 MMHG | DIASTOLIC BLOOD PRESSURE: 84 MMHG | HEART RATE: 79 BPM | RESPIRATION RATE: 16 BRPM

## 2025-08-26 DIAGNOSIS — Z41.9 SURGERY, ELECTIVE: ICD-10-CM

## 2025-08-26 DIAGNOSIS — M47.816 LUMBAR FACET ARTHROPATHY: ICD-10-CM

## 2025-08-26 PROCEDURE — 25010000002 LIDOCAINE PF 1% 1 % SOLUTION 5 ML VIAL: Performed by: ANESTHESIOLOGY

## 2025-08-26 PROCEDURE — 77002 NEEDLE LOCALIZATION BY XRAY: CPT

## 2025-08-26 PROCEDURE — 25510000001 IOPAMIDOL 61 % SOLUTION 30 ML VIAL: Performed by: ANESTHESIOLOGY

## 2025-08-26 PROCEDURE — 25010000002 BUPIVACAINE (PF) 0.5 % SOLUTION 10 ML VIAL: Performed by: ANESTHESIOLOGY

## 2025-08-26 RX ORDER — DIAZEPAM 5 MG/1
10 TABLET ORAL ONCE
Status: DISCONTINUED | OUTPATIENT
Start: 2025-08-26 | End: 2025-08-26 | Stop reason: HOSPADM

## (undated) DEVICE — EPIDURAL TRAY: Brand: MEDLINE INDUSTRIES, INC.

## (undated) DEVICE — NDL SPINE 22G 5IN BLK

## (undated) DEVICE — SYRINGE, LUER SLIP, STERILE, 60ML: Brand: MEDLINE

## (undated) DEVICE — NDL SPINE 22G 31/2IN BLK

## (undated) DEVICE — GOWN PROC ENDOARMOR GI LVL3 HY/SHLD UNIV

## (undated) DEVICE — SINGLE-USE BIOPSY FORCEPS: Brand: RADIAL JAW 4

## (undated) DEVICE — GLV SURG TRIUMPH PF LTX 7.5 STRL

## (undated) DEVICE — SNAR POLYP CAPTIVATOR/COLD STFF RND 10MM 240CM

## (undated) DEVICE — KT ORCA ORCAPOD DISP STRL

## (undated) DEVICE — CANN O2 ETCO2 FITS ALL CONN CO2 SMPL A/ 7IN DISP LF

## (undated) DEVICE — FLEX ADVANTAGE 1500CC: Brand: FLEX ADVANTAGE

## (undated) DEVICE — ADAPT CLN SCPE ENDO PORPOISE BX/50 DISP

## (undated) DEVICE — THE SINGLE USE ETRAP – POLYP TRAP IS USED FOR SUCTION RETRIEVAL OF ENDOSCOPICALLY REMOVED POLYPS.: Brand: ETRAP

## (undated) DEVICE — Device